# Patient Record
Sex: FEMALE | Race: WHITE | Employment: FULL TIME | URBAN - METROPOLITAN AREA
[De-identification: names, ages, dates, MRNs, and addresses within clinical notes are randomized per-mention and may not be internally consistent; named-entity substitution may affect disease eponyms.]

---

## 2017-05-04 ENCOUNTER — OFFICE VISIT (OUTPATIENT)
Dept: FAMILY MEDICINE CLINIC | Age: 27
End: 2017-05-04
Payer: COMMERCIAL

## 2017-05-04 VITALS
TEMPERATURE: 97.6 F | HEART RATE: 93 BPM | DIASTOLIC BLOOD PRESSURE: 70 MMHG | SYSTOLIC BLOOD PRESSURE: 101 MMHG | OXYGEN SATURATION: 98 % | WEIGHT: 206 LBS | BODY MASS INDEX: 33.25 KG/M2

## 2017-05-04 DIAGNOSIS — Z83.3 FAMILY HISTORY OF DIABETES MELLITUS: ICD-10-CM

## 2017-05-04 DIAGNOSIS — Z00.00 PREVENTATIVE HEALTH CARE: ICD-10-CM

## 2017-05-04 DIAGNOSIS — K59.00 CONSTIPATION, UNSPECIFIED CONSTIPATION TYPE: Primary | ICD-10-CM

## 2017-05-04 DIAGNOSIS — E66.9 OBESITY (BMI 30.0-34.9): ICD-10-CM

## 2017-05-04 PROCEDURE — 99204 OFFICE O/P NEW MOD 45 MIN: CPT | Performed by: NURSE PRACTITIONER

## 2017-05-04 RX ORDER — FAMOTIDINE 20 MG/1
20 TABLET, FILM COATED ORAL
COMMUNITY
Start: 2017-01-12 | End: 2017-05-04 | Stop reason: ALTCHOICE

## 2017-05-04 RX ORDER — VALACYCLOVIR HYDROCHLORIDE 500 MG/1
TABLET, FILM COATED ORAL
Refills: 0 | COMMUNITY
Start: 2017-02-07 | End: 2017-05-04 | Stop reason: ALTCHOICE

## 2017-05-04 ASSESSMENT — ENCOUNTER SYMPTOMS
WHEEZING: 0
SINUS PRESSURE: 0
SHORTNESS OF BREATH: 0
ABDOMINAL PAIN: 0
RHINORRHEA: 0
BLOOD IN STOOL: 0
CONSTIPATION: 1
EYE DISCHARGE: 0
COUGH: 0

## 2017-06-06 ENCOUNTER — HOSPITAL ENCOUNTER (EMERGENCY)
Facility: CLINIC | Age: 27
Discharge: HOME | End: 2017-06-06
Attending: EMERGENCY MEDICINE
Payer: COMMERCIAL

## 2017-06-06 ENCOUNTER — APPOINTMENT (OUTPATIENT)
Dept: GENERAL RADIOLOGY | Facility: CLINIC | Age: 27
End: 2017-06-06
Payer: COMMERCIAL

## 2017-06-06 VITALS
SYSTOLIC BLOOD PRESSURE: 127 MMHG | HEIGHT: 66 IN | TEMPERATURE: 98.1 F | DIASTOLIC BLOOD PRESSURE: 87 MMHG | HEART RATE: 80 BPM | OXYGEN SATURATION: 97 % | BODY MASS INDEX: 32.95 KG/M2 | WEIGHT: 205 LBS | RESPIRATION RATE: 16 BRPM

## 2017-06-06 DIAGNOSIS — J02.9 ACUTE PHARYNGITIS, UNSPECIFIED ETIOLOGY: Primary | ICD-10-CM

## 2017-06-06 PROCEDURE — 70360 X-RAY EXAM OF NECK: CPT

## 2017-06-06 PROCEDURE — 99283 EMERGENCY DEPT VISIT LOW MDM: CPT

## 2017-06-06 RX ORDER — PREDNISONE 10 MG/1
TABLET ORAL
Qty: 20 TABLET | Refills: 0 | Status: SHIPPED | OUTPATIENT
Start: 2017-06-06 | End: 2017-06-16

## 2017-06-06 RX ORDER — AMOXICILLIN 500 MG/1
500 CAPSULE ORAL 3 TIMES DAILY
Qty: 21 CAPSULE | Refills: 0 | Status: SHIPPED | OUTPATIENT
Start: 2017-06-06 | End: 2019-04-22

## 2019-04-22 ENCOUNTER — OFFICE VISIT (OUTPATIENT)
Dept: FAMILY MEDICINE CLINIC | Age: 29
End: 2019-04-22
Payer: OTHER GOVERNMENT

## 2019-04-22 VITALS
SYSTOLIC BLOOD PRESSURE: 118 MMHG | RESPIRATION RATE: 18 BRPM | WEIGHT: 230 LBS | OXYGEN SATURATION: 98 % | HEART RATE: 91 BPM | BODY MASS INDEX: 36.96 KG/M2 | DIASTOLIC BLOOD PRESSURE: 80 MMHG | HEIGHT: 66 IN

## 2019-04-22 DIAGNOSIS — B00.9 HSV INFECTION: ICD-10-CM

## 2019-04-22 DIAGNOSIS — Z83.3 FAMILY HISTORY OF DIABETES MELLITUS: ICD-10-CM

## 2019-04-22 DIAGNOSIS — Z13.29 SCREENING FOR THYROID DISORDER: ICD-10-CM

## 2019-04-22 DIAGNOSIS — E66.9 CLASS 2 OBESITY WITHOUT SERIOUS COMORBIDITY WITH BODY MASS INDEX (BMI) OF 37.0 TO 37.9 IN ADULT, UNSPECIFIED OBESITY TYPE: ICD-10-CM

## 2019-04-22 DIAGNOSIS — Z76.89 ENCOUNTER TO ESTABLISH CARE: Primary | ICD-10-CM

## 2019-04-22 DIAGNOSIS — Z13.228 SCREENING FOR METABOLIC DISORDER: ICD-10-CM

## 2019-04-22 PROCEDURE — 99385 PREV VISIT NEW AGE 18-39: CPT | Performed by: NURSE PRACTITIONER

## 2019-04-22 RX ORDER — VALACYCLOVIR HYDROCHLORIDE 1 G/1
1000 TABLET, FILM COATED ORAL DAILY
Qty: 5 TABLET | Refills: 5 | Status: SHIPPED | OUTPATIENT
Start: 2019-04-22 | End: 2019-11-08 | Stop reason: SDUPTHER

## 2019-04-22 ASSESSMENT — ENCOUNTER SYMPTOMS
WHEEZING: 0
SHORTNESS OF BREATH: 0
RESPIRATORY NEGATIVE: 1
ANAL BLEEDING: 1
DIARRHEA: 0
ALLERGIC/IMMUNOLOGIC NEGATIVE: 1
BLOOD IN STOOL: 0
VOMITING: 0
COLOR CHANGE: 0
EYES NEGATIVE: 1
STRIDOR: 0
CHEST TIGHTNESS: 0
COUGH: 0
CONSTIPATION: 1
NAUSEA: 0
ABDOMINAL PAIN: 0

## 2019-04-22 ASSESSMENT — PATIENT HEALTH QUESTIONNAIRE - PHQ9
SUM OF ALL RESPONSES TO PHQ QUESTIONS 1-9: 0
SUM OF ALL RESPONSES TO PHQ9 QUESTIONS 1 & 2: 0
SUM OF ALL RESPONSES TO PHQ QUESTIONS 1-9: 0
1. LITTLE INTEREST OR PLEASURE IN DOING THINGS: 0
2. FEELING DOWN, DEPRESSED OR HOPELESS: 0

## 2019-04-22 NOTE — PROGRESS NOTES
Visit Information    Have you changed or started any medications since your last visit including any over-the-counter medicines, vitamins, or herbal medicines? no   Are you having any side effects from any of your medications? -  no  Have you stopped taking any of your medications? Is so, why? -  no    Have you seen any other physician or provider since your last visit? No  Have you had any other diagnostic tests since your last visit? No  Have you been seen in the emergency room and/or had an admission to a hospital since we last saw you? No  Have you had your routine dental cleaning in the past 6 months? no    Have you activated your Smart Pipe account? If not, what are your barriers? Yes     Patient Care Team:  GRACE Bassett - CNP as PCP - General (Certified Nurse Practitioner)    Medical History Review  Past Medical, Family, and Social History reviewed and does not contribute to the patient presenting condition    Health Maintenance   Topic Date Due    HIV screen  07/11/2006    Cervical cancer screen  07/11/2012    Varicella Vaccine (2 of 2 - 13+ 2-dose series) 03/31/2017    Flu vaccine (Season Ended) 09/01/2019    DTaP/Tdap/Td vaccine (2 - Td) 03/02/2027    HPV vaccine  Aged Out    Pneumococcal 0-64 years Vaccine  Aged Out       [address]  Dept: [phone]    Anila Wilson is a 33 y.o. female who presents today for her medical conditions/complaintsas noted below. Anila Wilson is here today c/o New Patient (no previous pcp);  Weight Gain (thyroid has been fine); and Herpes Zoster (preventitive med & testing, top of buttock)      Past Medical History:   Diagnosis Date    HSV infection       Past Surgical History:   Procedure Laterality Date    COLPOSCOPY      TONSILLECTOMY      4340    UMBILICAL HERNIA REPAIR      WISDOM TOOTH EXTRACTION      2010       Family History   Problem Relation Age of Onset    Obesity Mother    Bright Velez OTC Miralax       Health Maintenance:      Subjective:     Review of Systems   Constitutional: Negative. Negative for activity change, appetite change, chills, diaphoresis, fatigue, fever and unexpected weight change. HENT: Negative. Eyes: Negative. Respiratory: Negative. Negative for cough, chest tightness, shortness of breath, wheezing and stridor. Cardiovascular: Negative. Negative for chest pain, palpitations and leg swelling. Gastrointestinal: Positive for anal bleeding (occ. w/ straining) and constipation. Negative for abdominal pain, blood in stool, diarrhea, nausea and vomiting. Endocrine: Negative. Negative for cold intolerance and heat intolerance. Genitourinary: Negative for difficulty urinating, frequency, hematuria and menstrual problem (IUD). Musculoskeletal: Negative. Skin: Negative. Negative for color change, pallor, rash and wound. Allergic/Immunologic: Negative. Neurological: Negative. Negative for dizziness, tremors, seizures, syncope, facial asymmetry, speech difficulty, weakness, light-headedness, numbness and headaches. Hematological: Negative. Does not bruise/bleed easily. Psychiatric/Behavioral: Negative for dysphoric mood and sleep disturbance. The patient is not nervous/anxious. Objective:     Vitals:    04/22/19 0759   BP: 118/80   Pulse: 91   Resp: 18   SpO2: 98%       Body mass index is 37.12 kg/m². Physical Exam   Constitutional: She is oriented to person, place, and time. She appears well-developed and well-nourished. No distress. HENT:   Head: Normocephalic and atraumatic. Right Ear: External ear normal.   Left Ear: External ear normal.   Nose: Nose normal.   Mouth/Throat: Oropharynx is clear and moist. No uvula swelling. No oropharyngeal exudate, posterior oropharyngeal edema, posterior oropharyngeal erythema or tonsillar abscesses. No tonsillar exudate. Cerumen impaction   Eyes: Pupils are equal, round, and reactive to light. 37.0 to 37.9 in adult, unspecified obesity type    - Excell MD Johan, Weight Management and Bariatrics, [facility]  - CBC Auto Differential; Future  - Comprehensive Metabolic Panel; Future  - Lipid Panel; Future    Discussed diet and routine exercise  Discussed medication options   Will f/u with surgeon and work first   [address] may be a good option for patient  She will check w/ her insurance as well     3. Family history of diabetes mellitus    - Comprehensive Metabolic Panel; Future    4. Screening for thyroid disorder    - TSH with Reflex; Future    5. Screening for metabolic disorder    - CBC Auto Differential; Future  - Comprehensive Metabolic Panel; Future    6. HSV infection    Valtrex given to use PRN     Discussed use, benefit, and side effects of prescribed medications. All patient questions answered. Pt voiced understanding. Reviewed health maintenance. Instructed to continue current medications, diet and exercise. Patient agreedwith treatment plan. Follow up as directed.      Electronically signed by GRACE Barrientos CNP on 4/22/2019

## 2019-05-03 ENCOUNTER — HOSPITAL ENCOUNTER (OUTPATIENT)
Age: 29
Setting detail: SPECIMEN
Discharge: HOME | End: 2019-05-03
Payer: OTHER GOVERNMENT

## 2019-05-03 ENCOUNTER — OFFICE VISIT (OUTPATIENT)
Dept: OBGYN CLINIC | Age: 29
End: 2019-05-03
Payer: OTHER GOVERNMENT

## 2019-05-03 VITALS
BODY MASS INDEX: 36.29 KG/M2 | HEIGHT: 67 IN | DIASTOLIC BLOOD PRESSURE: 82 MMHG | WEIGHT: 231.2 LBS | SYSTOLIC BLOOD PRESSURE: 106 MMHG

## 2019-05-03 DIAGNOSIS — Z01.419 ENCOUNTER FOR GYNECOLOGICAL EXAMINATION: Primary | ICD-10-CM

## 2019-05-03 DIAGNOSIS — Z97.5 IUD (INTRAUTERINE DEVICE) IN PLACE: ICD-10-CM

## 2019-05-03 LAB
ALBUMIN SERPL-MCNC: 4.3 G/DL
ALP BLD-CCNC: 72 U/L
ALT SERPL-CCNC: 17 U/L
ANION GAP SERPL CALCULATED.3IONS-SCNC: NORMAL MMOL/L
AST SERPL-CCNC: 11 U/L
BASOPHILS ABSOLUTE: 0.7 /ΜL
BASOPHILS RELATIVE PERCENT: 50 %
BILIRUB SERPL-MCNC: 0.3 MG/DL (ref 0.1–1.4)
BUN BLDV-MCNC: 12 MG/DL
CALCIUM SERPL-MCNC: 9 MG/DL
CHLORIDE BLD-SCNC: 109 MMOL/L
CHOLESTEROL, TOTAL: 110 MG/DL
CHOLESTEROL/HDL RATIO: 2.8
CO2: 25 MMOL/L
CREAT SERPL-MCNC: 0.8 MG/DL
EOSINOPHILS ABSOLUTE: 2.2 /ΜL
EOSINOPHILS RELATIVE PERCENT: 158 %
GFR CALCULATED: 101
GLUCOSE BLD-MCNC: 108 MG/DL
HCT VFR BLD CALC: 40.1 % (ref 36–46)
HDLC SERPL-MCNC: 39 MG/DL (ref 35–70)
HEMOGLOBIN: 13.1 G/DL (ref 12–16)
LDL CHOLESTEROL CALCULATED: 58 MG/DL (ref 0–160)
LYMPHOCYTES ABSOLUTE: 21.9 /ΜL
LYMPHOCYTES RELATIVE PERCENT: 1577 %
MCH RBC QN AUTO: 27.2 PG
MCHC RBC AUTO-ENTMCNC: 32.7 G/DL
MCV RBC AUTO: 83.4 FL
MONOCYTES ABSOLUTE: 7.4 /ΜL
MONOCYTES RELATIVE PERCENT: 533 %
NEUTROPHILS ABSOLUTE: 67.8 /ΜL
NEUTROPHILS RELATIVE PERCENT: 4882 %
PDW BLD-RTO: 12.6 %
PLATELET # BLD: 261 K/ΜL
PMV BLD AUTO: 10.8 FL
POTASSIUM SERPL-SCNC: 4.3 MMOL/L
RBC # BLD: 4.81 10^6/ΜL
SODIUM BLD-SCNC: 140 MMOL/L
TOTAL PROTEIN: 6.6
TRIGL SERPL-MCNC: 54 MG/DL
TSH SERPL DL<=0.05 MIU/L-ACNC: 1.72 UIU/ML
VLDLC SERPL CALC-MCNC: 71 MG/DL
WBC # BLD: 7.2 10^3/ML

## 2019-05-03 PROCEDURE — 99385 PREV VISIT NEW AGE 18-39: CPT | Performed by: NURSE PRACTITIONER

## 2019-05-03 ASSESSMENT — ENCOUNTER SYMPTOMS
CONSTIPATION: 0
DIARRHEA: 0
BACK PAIN: 0
SHORTNESS OF BREATH: 0
ABDOMINAL PAIN: 0
ABDOMINAL DISTENTION: 0
COUGH: 0

## 2019-05-03 NOTE — PROGRESS NOTES
HPI:     Evelyn Gil a 33 y.o. female who presents today for:  Chief Complaint   Patient presents with   [address] [facility] [address] Annual Exam     last pap 2017       HPI  Here as a new pt to establish care and for annual exam. Works as an  at the [employer]. Has a 3 y/o son. Thinking about eating healthy and adding activity. Her dad has Ca and lives with her. GYNECOLOGICHISTORY:  MenstrualHistory: No LMP recorded. Patient has had an implant. Sexually Transmitted Infections: No  History of Ectopic Pregnancy:No  Menarche: 13  No cycles  Sexually active: not currently  Dyspareunia: none    Current Outpatient Medications   Medication Sig Dispense Refill    valACYclovir (VALTREX) 1 g tablet Take 1 tablet by mouth daily Start within 24 hours of sx 5 tablet 5     No current facility-administered medications for this visit.       Allergies   Allergen Reactions    Percocet [Oxycodone-Acetaminophen] Nausea And Vomiting     Other reaction(s): severe Nausea And Vomiting; syncope    Vancomycin Hives       Past Medical History:   Diagnosis Date    HSV infection      Denies family history of breast, ovarian, uterus, colon CA     Past Surgical History:   Procedure Laterality Date    COLPOSCOPY      INTRAUTERINE DEVICE INSERTION  2017    Mirena     TONSILLECTOMY      8112    UMBILICAL HERNIA REPAIR      WISDOM TOOTH EXTRACTION      2010     Family History   Problem Relation Age of Onset    Obesity Mother     Diabetes Mother         borderline     Cervical Cancer Mother 37    Alcohol Abuse Father     Other Father         myeloproliferative disorder    No Known Problems Maternal Grandmother     Obesity Maternal Grandfather     Kidney Disease Maternal Grandfather     Substance Abuse Sister     Thyroid Disease Brother     Breast Cancer Paternal Grandmother     Other Brother         hearing loss      Social History     Tobacco Use    Smoking status: Never Smoker    Smokeless tobacco: Never Used   Substance Use Topics    Alcohol use: No        Subjective:      Review of Systems   Constitutional: Negative for appetite change and fatigue. HENT: Negative for congestion and hearing loss. Eyes: Negative for visual disturbance. Respiratory: Negative for cough and shortness of breath. Cardiovascular: Negative for chest pain and palpitations. Gastrointestinal: Negative for abdominal distention, abdominal pain, constipation and diarrhea. Genitourinary: Negative for flank pain, frequency, menstrual problem, pelvic pain and vaginal discharge. Musculoskeletal: Negative for back pain. Neurological: Negative for syncope and headaches. Psychiatric/Behavioral: Negative for behavioral problems. Objective:     Ht 5' 6.5\" (1.689 m)   Wt 231 lb 3.2 oz (104.9 kg)   Breastfeeding? No   BMI 36.76 kg/m²   Physical Exam   Constitutional: She is oriented to person, place, and time. She appears well-developed and well-nourished. Eyes: Pupils are equal, round, and reactive to light. Neck: No tracheal deviation present. No thyromegaly present. Cardiovascular: Normal rate, regular rhythm and normal heart sounds. Pulmonary/Chest: Effort normal and breath sounds normal. No respiratory distress. Right breast exhibits no inverted nipple. Left breast exhibits no inverted nipple, no mass, no nipple discharge, no skin change and no tenderness. No breast tenderness, discharge or bleeding. Breasts are symmetrical.   Abdominal: Soft. Bowel sounds are normal. She exhibits no distension and no mass. There is no tenderness. There is no CVA tenderness. Hernia confirmed negative in the right inguinal area and confirmed negative in the left inguinal area. Genitourinary: No breast tenderness, discharge or bleeding. There is no rash or lesion on the right labia. There is no rash or lesion on the left labia. Uterus is not deviated, not enlarged and not fixed.  Cervix exhibits no motion tenderness, no discharge and no friability. Right adnexum displays no mass, no tenderness and no fullness. Left adnexum displays no mass, no tenderness and no fullness. No tenderness in the vagina. No vaginal discharge found. Musculoskeletal: She exhibits no edema or tenderness. Lymphadenopathy:     She has no cervical adenopathy. Right: No inguinal adenopathy present. Left: No inguinal adenopathy present. Neurological: She is alert and oriented to person, place, and time. Skin: Skin is warm and dry. Psychiatric: She has a normal mood and affect. Her behavior is normal. Judgment and thought content normal.     IUD strings visualized    Assessment:     1. Encounter for gynecological examination          Plan:   1. Pap collected. Discussed new pap smear guidelines. Desires re-pap in 1 year. 2. Breast self exam reviewed  3. Calcium and Vitamin D dosing reviewed. 4. Seat belt use reviewed  5. Family planning reviewed.   Birth control IUD    Electronicallysigned by Alida Foster on 5/3/2019

## 2019-05-08 ENCOUNTER — TELEPHONE (OUTPATIENT)
Dept: FAMILY MEDICINE CLINIC | Age: 29
End: 2019-05-08

## 2019-05-08 NOTE — TELEPHONE ENCOUNTER
Patient says she does not qualify for bariatric surgery. 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 The number for the flight surgeon is [phone]. That is who she has to give all this information to.

## 2019-05-09 LAB — CYTOLOGY REPORT: NORMAL

## 2019-05-09 NOTE — TELEPHONE ENCOUNTER
Patient said this is due to insurance regulation, she does not have a BMI of 40 or greater. I am going to print the suggestions you made for her to check with insurance and she will stop by to get it so she can contact them.

## 2019-05-15 DIAGNOSIS — Z13.29 SCREENING FOR THYROID DISORDER: ICD-10-CM

## 2019-05-15 DIAGNOSIS — E66.9 CLASS 2 OBESITY WITHOUT SERIOUS COMORBIDITY WITH BODY MASS INDEX (BMI) OF 37.0 TO 37.9 IN ADULT, UNSPECIFIED OBESITY TYPE: ICD-10-CM

## 2019-05-15 DIAGNOSIS — Z83.3 FAMILY HISTORY OF DIABETES MELLITUS: ICD-10-CM

## 2019-05-15 DIAGNOSIS — Z13.228 SCREENING FOR METABOLIC DISORDER: ICD-10-CM

## 2019-08-13 ENCOUNTER — HOSPITAL ENCOUNTER (OUTPATIENT)
Age: 29
Setting detail: SPECIMEN
Discharge: HOME | End: 2019-08-13
Payer: OTHER GOVERNMENT

## 2019-08-13 ENCOUNTER — OFFICE VISIT (OUTPATIENT)
Dept: FAMILY MEDICINE CLINIC | Age: 29
End: 2019-08-13
Payer: OTHER GOVERNMENT

## 2019-08-13 VITALS
HEART RATE: 97 BPM | TEMPERATURE: 98.1 F | SYSTOLIC BLOOD PRESSURE: 110 MMHG | OXYGEN SATURATION: 100 % | DIASTOLIC BLOOD PRESSURE: 66 MMHG | WEIGHT: 212 LBS | RESPIRATION RATE: 12 BRPM | BODY MASS INDEX: 33.71 KG/M2

## 2019-08-13 DIAGNOSIS — R07.0 THROAT DISCOMFORT: Primary | ICD-10-CM

## 2019-08-13 DIAGNOSIS — R07.0 THROAT DISCOMFORT: ICD-10-CM

## 2019-08-13 PROCEDURE — 99213 OFFICE O/P EST LOW 20 MIN: CPT | Performed by: NURSE PRACTITIONER

## 2019-08-13 RX ORDER — IBUPROFEN 800 MG/1
TABLET ORAL
COMMUNITY
Start: 2019-05-08 | End: 2020-02-13

## 2019-08-13 ASSESSMENT — ENCOUNTER SYMPTOMS
VOICE CHANGE: 0
RESPIRATORY NEGATIVE: 1
SINUS PRESSURE: 0
GASTROINTESTINAL NEGATIVE: 1
EYES NEGATIVE: 1
CHEST TIGHTNESS: 0
CHOKING: 0
SINUS PAIN: 0
APNEA: 0
TROUBLE SWALLOWING: 0
NAUSEA: 0
FACIAL SWELLING: 0
VOMITING: 0
SHORTNESS OF BREATH: 0
COUGH: 0
STRIDOR: 0
COLOR CHANGE: 0
ABDOMINAL PAIN: 0
SORE THROAT: 1
WHEEZING: 0
ALLERGIC/IMMUNOLOGIC NEGATIVE: 1

## 2019-08-13 ASSESSMENT — PATIENT HEALTH QUESTIONNAIRE - PHQ9
SUM OF ALL RESPONSES TO PHQ QUESTIONS 1-9: 0
1. LITTLE INTEREST OR PLEASURE IN DOING THINGS: 0
SUM OF ALL RESPONSES TO PHQ9 QUESTIONS 1 & 2: 0
2. FEELING DOWN, DEPRESSED OR HOPELESS: 0
SUM OF ALL RESPONSES TO PHQ QUESTIONS 1-9: 0

## 2019-08-13 NOTE — PROGRESS NOTES
REMOVAL OF CONTRACEPTIVE CAPSULE  2011    TONSILLECTOMY      2816    UMBILICAL HERNIA REPAIR      WISDOM TOOTH EXTRACTION      2010       Family History   Problem Relation Age of Onset    Obesity Mother     Diabetes Mother         borderline     Cervical Cancer Mother 37    Alcohol Abuse Father     Other Father         myeloproliferative disorder    No Known Problems Maternal Grandmother     Obesity Maternal Grandfather     Kidney Disease Maternal Grandfather     Substance Abuse Sister     Thyroid Disease Brother     Breast Cancer Paternal Grandmother     Other Brother         hearing loss        Social History     Tobacco Use    Smoking status: Never Smoker    Smokeless tobacco: Never Used   Substance Use Topics    Alcohol use: No      Current Outpatient Medications   Medication Sig Dispense Refill    valACYclovir (VALTREX) 1 g tablet Take 1 tablet by mouth daily Start within 24 hours of sx 5 tablet 5     No current facility-administered medications for this visit.       Allergies   Allergen Reactions    Percocet [Oxycodone-Acetaminophen] Nausea And Vomiting     Other reaction(s): severe Nausea And Vomiting; syncope    Vancomycin Hives         HPI:     HPI    Presents today c/o sensation that something is stuck in her throat , L > R x 2 weeks   Patient declines waking up with a sore throat  Feels like the more she talks throughout the day she starts to get a scratchy throat sensation   Feels like she has to put more of an effort into swallowing her mucus  No issues swallowing food or drinks , no choking   No sensation that food is getting stuck, no problems swallowing   No acute URI symptoms, no fevers   No heartburn, nausea or vomiting  Had clear nasal congestion x 1 week which resolved  No obvious rhinorrhea or post nasal drip associated  Son has been sick however   Has tried increasing fluids / cold water which is temporary relief     Doing Keto diet  Has lost 20 lb on her own     Health

## 2019-08-15 LAB
CULTURE: NORMAL
Lab: NORMAL
SPECIMEN DESCRIPTION: NORMAL

## 2019-11-08 RX ORDER — VALACYCLOVIR HYDROCHLORIDE 1 G/1
1000 TABLET, FILM COATED ORAL DAILY
Qty: 5 TABLET | Refills: 0 | Status: SHIPPED | OUTPATIENT
Start: 2019-11-08 | End: 2020-02-13 | Stop reason: ALTCHOICE

## 2020-01-03 ENCOUNTER — OFFICE VISIT (OUTPATIENT)
Dept: FAMILY MEDICINE CLINIC | Age: 30
End: 2020-01-03
Payer: OTHER GOVERNMENT

## 2020-01-03 VITALS
OXYGEN SATURATION: 97 % | BODY MASS INDEX: 35.93 KG/M2 | HEART RATE: 88 BPM | WEIGHT: 226 LBS | DIASTOLIC BLOOD PRESSURE: 74 MMHG | SYSTOLIC BLOOD PRESSURE: 132 MMHG

## 2020-01-03 PROCEDURE — 99213 OFFICE O/P EST LOW 20 MIN: CPT | Performed by: NURSE PRACTITIONER

## 2020-01-03 NOTE — PROGRESS NOTES
Vancomycin Hives          Subjective     · Constitutional:  Negative for activity change, appetite change,unexpected weight change, chills, fever, and fatigue. · HENT: Negative for ear pain, sore throat,  Rhinorrhea, sinus pain, sinus pressure, congestion. · Eyes:  Negative for pain and discharge. · Respiratory:  Negative for chest tightness, shortness of breath, wheezing, and cough. · Cardiovascular:  Negative for chest pain, palpitations and leg swelling. · Gastrointestinal: Negative for abdominal pain, blood in stool, constipation,diarrhea, nausea and vomiting. · Endocrine: Negative for cold intolerance, heat intolerance, polydipsia, polyphagia and polyuria. · Genitourinary: Negative for difficulty urinating, dysuria, flank pain, frequency, hematuria and urgency. · Musculoskeletal: Negative for arthralgias, back pain, joint swelling, myalgias, neck pain and neck stiffness. Positive for right knee pain  · Skin: Negative for rash and wound. · Allergic/Immunologic: Negative for environmental allergies and food allergies. · Neurological:  Negative for dizziness, light-headedness, numbness and headaches. · Hematological:  Negative for adenopathy. Does not bruise/bleed easily. · Psychiatric/Behavioral: Negative for self-injury, sleep disturbance and suicidal ideas. Objective     PHYSICAL EXAM:   · Constitutional: Betina Stein is oriented to person, place, and time. Vital signs are normal. Appears well-developed and well-nourished. · HEENT:   · Head: Normocephalic and atraumatic. Eyes:PERRL, EOMI, Conjunctiva normal, No discharge. · Cardiovascular: Normal rate, regular rhythm, S1, S2, no murmur, no gallop, no friction rub, intact distal pulses. · Pulmonary/Chest: Breath sounds are clear throughout, No respiratory distress, No wheezing, No chest tenderness.  Effort normal  · Musculoskeletal:   Physical Exam  Musculoskeletal:      Right knee: She exhibits normal range of motion, no

## 2020-02-13 ENCOUNTER — OFFICE VISIT (OUTPATIENT)
Dept: FAMILY MEDICINE CLINIC | Age: 30
End: 2020-02-13
Payer: OTHER GOVERNMENT

## 2020-02-13 VITALS
BODY MASS INDEX: 34.63 KG/M2 | HEART RATE: 82 BPM | SYSTOLIC BLOOD PRESSURE: 122 MMHG | WEIGHT: 217.8 LBS | DIASTOLIC BLOOD PRESSURE: 78 MMHG | TEMPERATURE: 98.2 F | OXYGEN SATURATION: 98 %

## 2020-02-13 PROCEDURE — 99213 OFFICE O/P EST LOW 20 MIN: CPT | Performed by: NURSE PRACTITIONER

## 2020-02-13 RX ORDER — AMOXICILLIN 875 MG/1
TABLET, COATED ORAL 2 TIMES DAILY
COMMUNITY
Start: 2020-02-11 | End: 2020-09-29

## 2020-02-13 ASSESSMENT — ENCOUNTER SYMPTOMS
RESPIRATORY NEGATIVE: 1
COUGH: 0
TROUBLE SWALLOWING: 0
ALLERGIC/IMMUNOLOGIC NEGATIVE: 1
EYES NEGATIVE: 1
VOICE CHANGE: 0
NAUSEA: 0
SINUS PRESSURE: 1
SORE THROAT: 0
CHEST TIGHTNESS: 0
DIARRHEA: 0
COLOR CHANGE: 0
ABDOMINAL PAIN: 0
GASTROINTESTINAL NEGATIVE: 1
VOMITING: 0
SINUS PAIN: 0
RHINORRHEA: 0

## 2020-02-13 NOTE — PROGRESS NOTES
[facility]  [address] [address] [facility] [address]  Dept: [phone]    Shannan Booker is a 30 y.o. female who presents today for her medical conditions/complaintsas noted below. Shannan Booker is here today c/o Otalgia (left. urgent care tuesday- Amolil and Ciprodex drops)    Past Medical History:   Diagnosis Date    Abnormal Pap smear of cervix     HPV in female     HSV infection       Past Surgical History:   Procedure Laterality Date    COLPOSCOPY      INSERTION OF CONTRACEPTIVE CAPSULE  2011    Nexplanon     INTRAUTERINE DEVICE INSERTION  2017    Mirena     REMOVAL OF CONTRACEPTIVE CAPSULE  2011    TONSILLECTOMY      7638    UMBILICAL HERNIA REPAIR      WISDOM TOOTH EXTRACTION      2010       Family History   Problem Relation Age of Onset    Obesity Mother     Diabetes Mother         borderline     Cervical Cancer Mother 37    Alcohol Abuse Father     Other Father         myeloproliferative disorder    No Known Problems Maternal Grandmother     Obesity Maternal Grandfather     Kidney Disease Maternal Grandfather     Substance Abuse Sister     Thyroid Disease Brother     Breast Cancer Paternal Grandmother     Other Brother         hearing loss        Social History     Tobacco Use    Smoking status: Never Smoker    Smokeless tobacco: Never Used   Substance Use Topics    Alcohol use: No      Current Outpatient Medications   Medication Sig Dispense Refill    amoxicillin (AMOXIL) 875 MG tablet 2 times daily      CIPRODEX 0.3-0.1 % otic suspension 2 drops 4 times daily       No current facility-administered medications for this visit.       Allergies   Allergen Reactions    Percocet [Oxycodone-Acetaminophen] Nausea And Vomiting     Other reaction(s): severe Nausea And Vomiting; syncope    Vancomycin Hives         HPI:     Otalgia    There is pain in the left (started with 'pimple' that burst inside L ear canal, that proceed into 'infection' per urgent care) ear. This is a new problem. The current episode started in the past 7 days. The problem has been waxing and waning. There has been no fever. The patient is experiencing no pain. Associated symptoms include hearing loss (very muffeled L ear). Pertinent negatives include no abdominal pain, coughing, diarrhea, ear discharge, headaches, rash, rhinorrhea, sore throat or vomiting. She has tried ear drops (Amoxil) for the symptoms. The treatment provided no relief. There is no history of a chronic ear infection, hearing loss or a tympanostomy tube. Went to urgent care on Tues. Dx with \"infection\" and prescribed drops and Amoxil oral  Patient notes she does not think the drops are getting into her L ear and c/o muffled hearing     Health Maintenance:      Subjective:     Review of Systems   Constitutional: Negative. Negative for appetite change, chills, diaphoresis, fatigue and fever. HENT: Positive for hearing loss (very muffeled L ear), sinus pressure and tinnitus. Negative for congestion, ear discharge, ear pain, rhinorrhea, sinus pain, sore throat, trouble swallowing and voice change. Eyes: Negative. Respiratory: Negative. Negative for cough and chest tightness. Cardiovascular: Negative. Gastrointestinal: Negative. Negative for abdominal pain, diarrhea, nausea and vomiting. Endocrine: Negative. Genitourinary: Negative. Musculoskeletal: Negative. Skin: Negative. Negative for color change, pallor, rash and wound. Allergic/Immunologic: Negative. Neurological: Negative. Negative for headaches. Hematological: Negative. Psychiatric/Behavioral: Negative. Objective:     Vitals:    02/13/20 1106   BP: 122/78   Pulse: 82   Temp: 98.2 °F (36.8 °C)   SpO2: 98%       Body mass index is 34.63 kg/m². Physical Exam  Constitutional:       General: She is not in acute distress. Appearance: She is well-developed. HENT:      Head: Normocephalic and atraumatic.       Right Ear: and side effects of prescribed medications. All patient questions answered. Pt voiced understanding. Reviewed health maintenance. Instructed to continue current medications, diet and exercise. Patient agreedwith treatment plan. Follow up as directed.      Electronically signed by GRACE Alanis CNP on 2/13/2020

## 2020-09-29 ENCOUNTER — NURSE TRIAGE (OUTPATIENT)
Dept: OTHER | Facility: CLINIC | Age: 30
End: 2020-09-29

## 2020-09-29 ENCOUNTER — OFFICE VISIT (OUTPATIENT)
Dept: FAMILY MEDICINE CLINIC | Age: 30
End: 2020-09-29
Payer: OTHER GOVERNMENT

## 2020-09-29 VITALS
WEIGHT: 244.8 LBS | SYSTOLIC BLOOD PRESSURE: 114 MMHG | HEART RATE: 82 BPM | OXYGEN SATURATION: 98 % | BODY MASS INDEX: 38.92 KG/M2 | TEMPERATURE: 97.5 F | DIASTOLIC BLOOD PRESSURE: 78 MMHG

## 2020-09-29 LAB
BASOPHILS ABSOLUTE: 0.7 /ΜL
BASOPHILS RELATIVE PERCENT: 60 %
EOSINOPHILS ABSOLUTE: 2.4 /ΜL
EOSINOPHILS RELATIVE PERCENT: 206 %
HCT VFR BLD CALC: 42.3 % (ref 36–46)
HEMOGLOBIN: 14.3 G/DL (ref 12–16)
LYMPHOCYTES ABSOLUTE: 23.5 /ΜL
LYMPHOCYTES RELATIVE PERCENT: 2021 %
MCH RBC QN AUTO: 28.7 PG
MCHC RBC AUTO-ENTMCNC: 33.8 G/DL
MCV RBC AUTO: 84.9 FL
MONOCYTES ABSOLUTE: 7.3 /ΜL
MONOCYTES RELATIVE PERCENT: 628 %
NEUTROPHILS ABSOLUTE: 66.1 /ΜL
NEUTROPHILS RELATIVE PERCENT: 5685 %
PDW BLD-RTO: 12.7 %
PLATELET # BLD: 299 K/ΜL
PMV BLD AUTO: 10.3 FL
RBC # BLD: 4.98 10^6/ΜL
WBC # BLD: 8.6 10^3/ML

## 2020-09-29 PROCEDURE — 99213 OFFICE O/P EST LOW 20 MIN: CPT | Performed by: FAMILY MEDICINE

## 2020-09-29 ASSESSMENT — ENCOUNTER SYMPTOMS
DIFFICULTY BREATHING: 0
HEMATEMESIS: 0
RECTAL PAIN: 1
DIARRHEA: 0
HEMATOCHEZIA: 1

## 2020-09-29 NOTE — TELEPHONE ENCOUNTER
Reason for Disposition   MODERATE rectal bleeding (small blood clots, passing blood without stool, or toilet water turns red)    Answer Assessment - Initial Assessment Questions  1. APPEARANCE of BLOOD: \"What color is it? \" \"Is it passed separately, on the surface of the stool, or mixed in with the stool? \"       Bright red blood on the stool and in toilet water. 2. AMOUNT: \"How much blood was passed? \"       Small amount of blood on stool and in water - Dime size clots when wiping. 3. FREQUENCY: \"How many times has blood been passed with the stools? \"       Every other day BM with blood present. Has been going on for 3 weeks. 4. ONSET: \"When was the blood first seen in the stools? \" (Days or weeks)       Approx 3 weeks ago. 5. DIARRHEA: \"Is there also some diarrhea? \" If so, ask: \"How many diarrhea stools were passed in past 24 hours? \"       Formed, soft stool. 6. CONSTIPATION: \"Do you have constipation? \" If so, \"How bad is it? \"      None  7. RECURRENT SYMPTOMS: \"Have you had blood in your stools before? \" If so, ask: \"When was the last time? \" and \"What happened that time? \"       Has not had this before. 8. BLOOD THINNERS: \"Do you take any blood thinners? \" (e.g., Coumadin/warfarin, Pradaxa/dabigatran, aspirin)      No blood thinners. 9. OTHER SYMPTOMS: \"Do you have any other symptoms? \"  (e.g., abdominal pain, vomiting, dizziness, fever)      Descri bed as \"period cramps\"  10. PREGNANCY: \"Is there any chance you are pregnant? \" \"When was your last menstrual period? \"       Has Mirena    Protocols used: RECTAL BLEEDING-ADULT-[place]    Pt reports having BM every other day that has bright red blood visible on the formed stool and in the toilet bowl. Reports have dime size blood clots when she wipes. States this has been ongoing for 3 weeks. Reviewed for pt to be seen today by PCP. Warm transfer to [facility] in [place]. Caller provided care advice and instructed to call back with worsening symptoms. Attention Provider: Thank you for allowing me to participate in the care of your patient. The patient was connected to triage in response to information provided to the [facility]. Please do not respond through this encounter as the response is not directed to a shared pool.

## 2020-09-29 NOTE — PATIENT INSTRUCTIONS
Patient Education        Rectal Bleeding: Care Instructions  Your Care Instructions     Rectal bleeding in small amounts is common. You may see red spotting on toilet paper or drops of blood in the toilet. Rectal bleeding has many possible causes, from something as minor as hemorrhoids to something as serious as colon cancer. You may need more tests to find the cause of your bleeding. Follow-up care is a key part of your treatment and safety. Be sure to make and go to all appointments, and call your doctor if you are having problems. It's also a good idea to know your test results and keep a list of the medicines you take. How can you care for yourself at home? · Avoid aspirin and other nonsteroidal anti-inflammatory drugs (NSAIDs), such as ibuprofen (Advil, Motrin) and naproxen (Aleve). They can cause you to bleed more. Ask your doctor if you can take acetaminophen (Tylenol). Read and follow all instructions on the label. · Use a stool softener that contains bran or psyllium. You can save money by buying bran or psyllium (available in bulk at most health food stores) and sprinkling it on foods or stirring it into fruit juice. You can also use a product such as Metamucil or Citrucel. · Take your medicines exactly as directed. Call your doctor if you think you are having a problem with your medicine. When should you call for help? [id number] anytime you think you may need emergency care. For example, call if:  · You passed out (lost consciousness). Call your doctor now or seek immediate medical care if:  · You have new or worse pain. · You have new or worse bleeding from the rectum. · You are dizzy or light-headed, or you feel like you may faint. Watch closely for changes in your health, and be sure to contact your doctor if:  · You cannot pass stools or gas. · You do not get better as expected. Where can you learn more? Go to https://[email or web address].[employer]. org and sign in to your [employer] account. Enter V009 in the [facility] box to learn more about \"Rectal Bleeding: Care Instructions. \"     If you do not have an account, please click on the \"Sign Up Now\" link. Current as of: August 12, 2019               Content Version: 12.5  © 4709-6588 [employer]. Care instructions adapted under license by [facility] ([facility]). If you have questions about a medical condition or this instruction, always ask your healthcare professional. [address] any warranty or liability for your use of this information.

## 2020-09-29 NOTE — PROGRESS NOTES
[facility] Progress Note    Date:9/29/2020         Patient Ignacia Diaz     YOB: 1990     Age:30 y.o. Assessment/Plan        Problem List Items Addressed This Visit        Digestive    Chronic constipation     Continue OTC treatment  Refer to GI for further workup and management         Relevant Orders    POCT occult blood stool    Blood Occult Stool #1    Blood Occult Stool #1    Blood Occult Stool #1    CBC Auto Differential    Peter Moulton MD, G[facility]ology, [address]      Other Visit Diagnoses     BRBPR (bright red blood per rectum)    -  Primary    Negative hemeoccult in office today  Given hemeoccult cards x3  Refer to GI    Relevant Orders    POCT occult blood stool    Blood Occult Stool #1    Blood Occult Stool #1    Blood Occult Stool #1    CBC Auto Differential    Peter Moulton MD, Gastroenterology, [address]    History of anal fissures        Relevant Orders    POCT occult blood stool    Blood Occult Stool #1    Blood Occult Stool #1    Blood Occult Stool #1    CBC Auto Differential    Peter Moulton MD, Gastroenterology, [address]    History of hemorrhoids        Relevant Orders    POCT occult blood stool    Blood Occult Stool #1    Blood Occult Stool #1    Blood Occult Stool #1    CBC Auto Differential    Peter Moulton MD, Gastroenterology, [address]           Return if symptoms worsen or fail to improve. Electronically signed by Matt Parham DO on 9/29/20         Sridhar Leong is a 35 y.o. female presenting today for   Chief Complaint   Patient presents with    Rectal Bleeding     X 3 weeks w/ bowel movements. clots     Pharyngitis     son dx strep   . Bowels have been \"difficult\" her whole life. Normal BMs are once per week. Very firm and hard. She does Miralax multiple times per week when she can. 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 Works a lot (). Going to school.  Never diagnosed with external hemorrhoids or felt them. Has had occasional bleeding in the past but short duration and self limiting. Now going every other day. Always some sort of blood on stool. Always bright red. Sometimes in bowl. Clot every other BM. Size of diameter of drinking straw. Was taking Amitiza when she was in the [employer]. No period since IUD placement 3 years ago. Mom has h/o polyps. No CRC history    Developed sore throat this morning. No fever. Son has strep throat started 3 days ago confirmed by overnight test. Rapid had been negative. Rectal Bleeding    The current episode started more than 2 weeks ago (3 weeks). The onset was sudden. The problem occurs frequently. The problem has been gradually worsening. The pain is moderate. The stool is described as hard and bloody. Prior successful therapies include laxatives (amitiza). Prior unsuccessful therapies include stool softeners. Associated symptoms include hemorrhoids and rectal pain. Pertinent negatives include no diarrhea, no hematemesis, no vaginal bleeding, no vaginal discharge and no difficulty breathing. She has been behaving normally. She has been eating and drinking normally. Urine output has been normal. Her past medical history does not include recent antibiotic use or a recent illness. Review of Systems   Review of Systems   Gastrointestinal: Positive for hematochezia, hemorrhoids and rectal pain. Negative for diarrhea and hematemesis. Genitourinary: Negative for vaginal bleeding and vaginal discharge. All other systems reviewed and are negative. Medications     No current outpatient medications on file. No current facility-administered medications for this visit. Past History    Past Medical History:   has a past medical history of Abnormal Pap smear of cervix, HPV in female, and HSV infection. Social History:   reports that she has never smoked.  She has never used smokeless tobacco. She reports that she does not drink alcohol or use drugs. Family History:   Family History   Problem Relation Age of Onset    Obesity Mother     Diabetes Mother         borderline     Cervical Cancer Mother 37    Alcohol Abuse Father     Other Father         myeloproliferative disorder    No Known Problems Maternal Grandmother     Obesity Maternal Grandfather     Kidney Disease Maternal Grandfather     Substance Abuse Sister     Thyroid Disease Brother     Breast Cancer Paternal Grandmother     Other Brother         hearing loss        Surgical History:   Past Surgical History:   Procedure Laterality Date    COLPOSCOPY      INSERTION OF CONTRACEPTIVE CAPSULE  2011    Nexplanon     INTRAUTERINE DEVICE INSERTION  2017    Mirena     REMOVAL OF CONTRACEPTIVE CAPSULE  2011    TONSILLECTOMY      9659    UMBILICAL HERNIA REPAIR      WISDOM TOOTH EXTRACTION      2010        Physical Examination      Vitals:  /78   Pulse 82   Temp 97.5 °F (36.4 °C) (Temporal)   Wt 244 lb 12.8 oz (111 kg)   SpO2 98%   BMI 38.92 kg/m²     Physical Exam  Vitals signs and nursing note reviewed. Exam conducted with a chaperone present Thiago Bhakta MA). Constitutional:       General: She is not in acute distress. Appearance: Normal appearance. She is normal weight. She is not ill-appearing, toxic-appearing or diaphoretic. HENT:      Head: Normocephalic and atraumatic. Eyes:      General: No scleral icterus. Right eye: No discharge. Left eye: No discharge. Extraocular Movements: Extraocular movements intact. Conjunctiva/sclera: Conjunctivae normal.   Cardiovascular:      Rate and Rhythm: Normal rate and regular rhythm. Pulses: Normal pulses. Heart sounds: Normal heart sounds. No murmur. No friction rub. No gallop. Pulmonary:      Effort: Pulmonary effort is normal. No respiratory distress. Breath sounds: Normal breath sounds. No stridor. No wheezing, rhonchi or rales. Chest:      Chest wall: No tenderness. Abdominal:      General: Abdomen is flat. Bowel sounds are decreased. There is no distension or abdominal bruit. There are no signs of injury. Palpations: Abdomen is soft. There is no shifting dullness, fluid wave, hepatomegaly, splenomegaly, mass or pulsatile mass. Tenderness: There is abdominal tenderness in the right lower quadrant and left lower quadrant. There is no right CVA tenderness, left CVA tenderness, guarding or rebound. Negative signs include Contreras's sign, Rovsing's sign, McBurney's sign, psoas sign and obturator sign. Genitourinary:     Rectum: Guaiac result negative (negative). Tenderness present. No mass, anal fissure, external hemorrhoid (skin tag at 6 oclock) or internal hemorrhoid. Abnormal anal tone (very tight/small rectal opening). Neurological:      Mental Status: She is alert and oriented to person, place, and time. Mental status is at baseline. Psychiatric:         Mood and Affect: Mood normal.         Behavior: Behavior normal.         Thought Content: Thought content normal.         Judgment: Judgment normal.         Labs/Imaging/Diagnostics   Labs:  No results found for: CMPWITHGFR, CBCAUTODIF, TSHFT4, LABA1C, LIPIDPAN    Imaging Last 24 Hours:  XR Neck Soft Tissue  Narrative: EXAMINATION:  AP AND LATERAL VIEWS OF THE NECK SOFT TISSUES    6/6/2017 3:18 pm    COMPARISON:  None. HISTORY:  ORDERING SYSTEM PROVIDED HISTORY: pain  TECHNOLOGIST PROVIDED HISTORY:  Reason for exam:->pain  Ordering Physician Provided Reason for Exam: Pt c/o feeling of \"something  being stuck in my throat. It feels like a popcorn kernel. \" Pt denies eating  popcorn, denies trauma, denies surgery. Sensation x10 days. Acuity: Acute  Type of Exam: Initial    FINDINGS:  AP and lateral views of the soft tissue neck are submitted. The epiglottis  and aryepiglottic folds are unremarkable the prevertebral soft tissues are  unremarkable. No radiopaque foreign body or retropharyngeal gas. No  subglottic edema. Cervical vertebral alignment is maintained. Impression: Unremarkable AP and lateral soft tissue neck.

## 2020-09-30 ENCOUNTER — OFFICE VISIT (OUTPATIENT)
Dept: GASTROENTEROLOGY | Age: 30
End: 2020-09-30
Payer: OTHER GOVERNMENT

## 2020-09-30 VITALS — WEIGHT: 237.8 LBS | BODY MASS INDEX: 37.81 KG/M2

## 2020-09-30 PROCEDURE — 99204 OFFICE O/P NEW MOD 45 MIN: CPT | Performed by: INTERNAL MEDICINE

## 2020-09-30 RX ORDER — HYDROCORTISONE ACETATE 25 MG/1
25 SUPPOSITORY RECTAL EVERY 12 HOURS
Qty: 30 SUPPOSITORY | Refills: 2 | Status: SHIPPED | OUTPATIENT
Start: 2020-09-30 | End: 2021-04-22

## 2020-09-30 RX ORDER — POLYETHYLENE GLYCOL 3350 17 G/17G
17 POWDER, FOR SOLUTION ORAL DAILY
Status: ON HOLD | COMMUNITY
End: 2022-11-02 | Stop reason: HOSPADM

## 2020-09-30 ASSESSMENT — ENCOUNTER SYMPTOMS
COUGH: 0
SORE THROAT: 0
CHOKING: 0
BACK PAIN: 0
SINUS PRESSURE: 0
ABDOMINAL DISTENTION: 0
BLOOD IN STOOL: 1
CONSTIPATION: 1
DIARRHEA: 0
NAUSEA: 0
RECTAL PAIN: 1
ANAL BLEEDING: 0
WHEEZING: 0
VOMITING: 0
ABDOMINAL PAIN: 0
TROUBLE SWALLOWING: 0
VOICE CHANGE: 0

## 2020-10-01 RX ORDER — SODIUM, POTASSIUM,MAG SULFATES 17.5-3.13G
SOLUTION, RECONSTITUTED, ORAL ORAL
Qty: 1 KIT | Refills: 0 | Status: SHIPPED | OUTPATIENT
Start: 2020-10-01 | End: 2021-04-22

## 2020-10-19 NOTE — TELEPHONE ENCOUNTER
Patient called 10/19/2020 @ 12:33  And left a message on the voicemail . That she would like to cancel her covid testing and colonoscopy for now and will call back to reschedule when she finds out when she cancel reschedule due to work. Please cancel for now .  Thank You

## 2021-04-22 ENCOUNTER — HOSPITAL ENCOUNTER (OUTPATIENT)
Age: 31
Setting detail: SPECIMEN
Discharge: HOME | End: 2021-04-22
Payer: OTHER GOVERNMENT

## 2021-04-22 ENCOUNTER — OFFICE VISIT (OUTPATIENT)
Dept: OBGYN CLINIC | Age: 31
End: 2021-04-22
Payer: OTHER GOVERNMENT

## 2021-04-22 VITALS
WEIGHT: 239.8 LBS | BODY MASS INDEX: 37.64 KG/M2 | HEIGHT: 67 IN | SYSTOLIC BLOOD PRESSURE: 104 MMHG | DIASTOLIC BLOOD PRESSURE: 70 MMHG

## 2021-04-22 DIAGNOSIS — Z01.419 ENCOUNTER FOR GYNECOLOGICAL EXAMINATION: Primary | ICD-10-CM

## 2021-04-22 DIAGNOSIS — L98.8 SKIN LESION OF BREAST: ICD-10-CM

## 2021-04-22 DIAGNOSIS — Z30.431 IUD CHECK UP: ICD-10-CM

## 2021-04-22 PROCEDURE — 99395 PREV VISIT EST AGE 18-39: CPT | Performed by: NURSE PRACTITIONER

## 2021-04-22 RX ORDER — VALACYCLOVIR HYDROCHLORIDE 1 G/1
1000 TABLET, FILM COATED ORAL DAILY
Qty: 5 TABLET | Refills: 5 | Status: SHIPPED | OUTPATIENT
Start: 2021-04-22 | End: 2021-04-27

## 2021-04-22 ASSESSMENT — ENCOUNTER SYMPTOMS
DIARRHEA: 0
BACK PAIN: 0
SHORTNESS OF BREATH: 0
ABDOMINAL PAIN: 0
ABDOMINAL DISTENTION: 0
CONSTIPATION: 0
COUGH: 0

## 2021-04-22 NOTE — PROGRESS NOTES
HPI:     Anthony Polk a 35 y.o. female who presents today for:  Chief Complaint   Patient presents with    Annual Exam     last pap 5/3/19-WNL        HPI  Here for annual exam. Works as an - waiting to see if she is getting a promotion. Has 1 son who is 4. IUD due to remove/replace. Eating healthy and has been working out regularly. Hs been having some skin eruptions on her left breast (primarily) for about 6 weeks. She reports that her \"pores seem to open up and become filled with pus. \" and spontaneously resolve. Wondering if she should have any breast imaging. Denies immediate fam hx breast ca. Discussed using antibacterial soap on her breasts, washing sports bras in hot water and taking pictures of the initial eruptin and progression of lesions. May treat x 1 with bactrim or consider referral to derm. CBE negative. Pt agreeable to POC. GYNECOLOGICHISTORY:  MenstrualHistory: No LMP recorded. Patient has had an implant. Sexually Transmitted Infections: No  History of Ectopic Pregnancy:No  Menarche: 13  Denies VB w/IUD  Sexually active: yes, not currently  Dyspareunia: none    Current Outpatient Medications   Medication Sig Dispense Refill    Na Sulfate-K Sulfate-Mg Sulf (SUPREP BOWEL PREP KIT) 17.5-3.13-1.6 GM/177ML SOLN Use as directed per instructions provided by physician office 1 kit 0    polyethylene glycol (GLYCOLAX) 17 GM/SCOOP powder Take 17 g by mouth daily as needed      hydrocortisone (ANUSOL-HC) 25 MG suppository Place 1 suppository rectally every 12 hours 30 suppository 2     No current facility-administered medications for this visit.       Allergies   Allergen Reactions    Percocet [Oxycodone-Acetaminophen] Nausea And Vomiting     Other reaction(s): severe Nausea And Vomiting; syncope    Vancomycin Hives       Past Medical History:   Diagnosis Date    Abnormal Pap smear of cervix     BRBPR (bright red blood per rectum)     Chronic constipation     History of anal fissures     History of hemorrhoids     HPV in female     HSV infection      Denies family history of breast, ovarian, uterus, colon CA     Past Surgical History:   Procedure Laterality Date    COLPOSCOPY      INSERTION OF CONTRACEPTIVE CAPSULE  2011    Nexplanon     INTRAUTERINE DEVICE INSERTION  2017    Mirena     REMOVAL OF CONTRACEPTIVE CAPSULE  2011    TONSILLECTOMY      1476    UMBILICAL HERNIA REPAIR      WISDOM TOOTH EXTRACTION      2010     Family History   Problem Relation Age of Onset    Obesity Mother     Diabetes Mother         borderline     Cervical Cancer Mother 37    Alcohol Abuse Father     Other Father         myeloproliferative disorder    No Known Problems Maternal Grandmother     Obesity Maternal Grandfather     Kidney Disease Maternal Grandfather     Substance Abuse Sister     Thyroid Disease Brother     Breast Cancer Paternal Grandmother     Other Brother         hearing loss      Social History     Tobacco Use    Smoking status: Never Smoker    Smokeless tobacco: Never Used   Substance Use Topics    Alcohol use: No        Subjective:      Review of Systems   Constitutional: Negative for appetite change and fatigue. HENT: Negative for congestion and hearing loss. Eyes: Negative for visual disturbance. Respiratory: Negative for cough and shortness of breath. Cardiovascular: Negative for chest pain and palpitations. Gastrointestinal: Negative for abdominal distention, abdominal pain, constipation and diarrhea. Genitourinary: Negative for flank pain, frequency, menstrual problem, pelvic pain and vaginal discharge. Musculoskeletal: Negative for back pain. Neurological: Negative for syncope and headaches. Psychiatric/Behavioral: Negative for behavioral problems. Objective:     Ht 5' 6.5\" (1.689 m)   Wt 239 lb 12.8 oz (108.8 kg)   Breastfeeding No   BMI 38.12 kg/m²   Physical Exam  Constitutional:       Appearance: She is well-developed.    HENT: Head: Normocephalic. Eyes:      Extraocular Movements: Extraocular movements intact. Conjunctiva/sclera: Conjunctivae normal.   Neck:      Musculoskeletal: Normal range of motion. Thyroid: No thyromegaly. Trachea: No tracheal deviation. Pulmonary:      Effort: Pulmonary effort is normal. No respiratory distress. Chest:      Breasts: Breasts are symmetrical.         Right: No inverted nipple. Left: No inverted nipple, mass, nipple discharge, skin change or tenderness. Abdominal:      General: There is no distension. Palpations: Abdomen is soft. There is no mass. Tenderness: There is no abdominal tenderness. Genitourinary:     Labia:         Right: No rash or lesion. Left: No rash or lesion. Vagina: No vaginal discharge or tenderness. Cervix: No cervical motion tenderness, discharge or friability. Uterus: Not deviated, not enlarged and not fixed. Adnexa:         Right: No mass, tenderness or fullness. Left: No mass, tenderness or fullness. Musculoskeletal: Normal range of motion. General: No tenderness. Skin:     General: Skin is warm and dry. Neurological:      General: No focal deficit present. Mental Status: She is alert and oriented to person, place, and time. Mental status is at baseline. Psychiatric:         Mood and Affect: Mood normal.         Behavior: Behavior normal.         Thought Content: Thought content normal.         Judgment: Judgment normal.       IUD strings visualized and appropriate length    Assessment:     1. Encounter for gynecological examination    2. IUD check up          Plan:   1. Pap collected. Discussed new pap smear guidelines. Desires re-pap in 1 year. 2. Breast self exam reviewed  3. Calcium and Vitamin D dosing reviewed. 4. Seat belt use reviewed  5. Family planning reviewed.   Birth control IUD  Antibacterial soap on breasts, continue to monitor  Will call for meds in March for IUD remove/replace    Electronicallysigned by Chinyere Solanor on 4/22/2021

## 2021-04-26 LAB — CYTOLOGY REPORT: NORMAL

## 2021-05-10 ENCOUNTER — HOSPITAL ENCOUNTER (OUTPATIENT)
Dept: GENERAL RADIOLOGY | Age: 31
Discharge: HOME | End: 2021-05-12
Payer: OTHER GOVERNMENT

## 2021-05-10 ENCOUNTER — OFFICE VISIT (OUTPATIENT)
Dept: FAMILY MEDICINE CLINIC | Age: 31
End: 2021-05-10
Payer: OTHER GOVERNMENT

## 2021-05-10 ENCOUNTER — HOSPITAL ENCOUNTER (OUTPATIENT)
Age: 31
Discharge: HOME | End: 2021-05-12
Payer: OTHER GOVERNMENT

## 2021-05-10 VITALS
WEIGHT: 236.6 LBS | TEMPERATURE: 97 F | SYSTOLIC BLOOD PRESSURE: 110 MMHG | OXYGEN SATURATION: 97 % | DIASTOLIC BLOOD PRESSURE: 74 MMHG | HEART RATE: 92 BPM | HEIGHT: 66 IN | BODY MASS INDEX: 38.02 KG/M2

## 2021-05-10 DIAGNOSIS — G89.29 CHRONIC PAIN OF LEFT KNEE: ICD-10-CM

## 2021-05-10 DIAGNOSIS — M25.562 CHRONIC PAIN OF LEFT KNEE: ICD-10-CM

## 2021-05-10 DIAGNOSIS — M25.562 CHRONIC PAIN OF LEFT KNEE: Primary | ICD-10-CM

## 2021-05-10 DIAGNOSIS — G89.29 CHRONIC PAIN OF LEFT KNEE: Primary | ICD-10-CM

## 2021-05-10 PROCEDURE — 99213 OFFICE O/P EST LOW 20 MIN: CPT | Performed by: NURSE PRACTITIONER

## 2021-05-10 PROCEDURE — 73562 X-RAY EXAM OF KNEE 3: CPT

## 2021-05-10 ASSESSMENT — ENCOUNTER SYMPTOMS
ALLERGIC/IMMUNOLOGIC NEGATIVE: 1
DIARRHEA: 0
VOMITING: 0
COLOR CHANGE: 0
COUGH: 0
SHORTNESS OF BREATH: 0
EYES NEGATIVE: 1
GASTROINTESTINAL NEGATIVE: 1
NAUSEA: 0
RESPIRATORY NEGATIVE: 1

## 2021-05-10 ASSESSMENT — PATIENT HEALTH QUESTIONNAIRE - PHQ9
SUM OF ALL RESPONSES TO PHQ9 QUESTIONS 1 & 2: 0
2. FEELING DOWN, DEPRESSED OR HOPELESS: 0
SUM OF ALL RESPONSES TO PHQ QUESTIONS 1-9: 0

## 2021-05-10 NOTE — PROGRESS NOTES
[facility]  [address] [place] [address]  Dept: [phone]    Isiah Long is a 35 y.o. female who presents today for her medical conditions/complaintsas noted below. Isiah Long is here today c/o Knee Pain (left)    Past Medical History:   Diagnosis Date    Abnormal Pap smear of cervix     BRBPR (bright red blood per rectum)     Chronic constipation     History of anal fissures     History of hemorrhoids     HPV in female     HSV infection       Past Surgical History:   Procedure Laterality Date    COLONOSCOPY  10/2020    COLPOSCOPY      INSERTION OF CONTRACEPTIVE CAPSULE  2011    Nexplanon     INTRAUTERINE DEVICE INSERTION  2017    Mirena     REMOVAL OF CONTRACEPTIVE CAPSULE  2011    TONSILLECTOMY      5102    UMBILICAL HERNIA REPAIR      WISDOM TOOTH EXTRACTION      2010       Family History   Problem Relation Age of Onset    Obesity Mother     Diabetes Mother         borderline     Cervical Cancer Mother 37    Alcohol Abuse Father     Other Father         myeloproliferative disorder    No Known Problems Maternal Grandmother     Obesity Maternal Grandfather     Kidney Disease Maternal Grandfather     Substance Abuse Sister     Thyroid Disease Brother     Breast Cancer Paternal Grandmother     Other Brother         hearing loss        Social History     Tobacco Use    Smoking status: Never Smoker    Smokeless tobacco: Never Used   Substance Use Topics    Alcohol use: No      Current Outpatient Medications   Medication Sig Dispense Refill    polyethylene glycol (GLYCOLAX) 17 GM/SCOOP powder Take 17 g by mouth daily as needed       No current facility-administered medications for this visit.       Allergies   Allergen Reactions    Percocet [Oxycodone-Acetaminophen] Nausea And Vomiting     Other reaction(s): severe Nausea And Vomiting; syncope    Vancomycin Hives         HPI:     Knee Pain   The incident occurred more than 1 week ago (Left knee pain, ongoing 10+ years, pain is intermittent, started new exercise program within the past 8 weeks that includes a lot of squatting/lunging). There was no injury mechanism (repetitive activity, no acute trauma). The pain is present in the left knee. The quality of the pain is described as aching. The pain is moderate. The pain has been intermittent since onset. Pertinent negatives include no inability to bear weight, loss of motion, loss of sensation, muscle weakness, numbness or tingling. Associated symptoms comments: No swelling or redness . She reports no foreign bodies present. Exacerbated by: lunging, jumping (does OK with ellipical & walking) She has tried rest for the symptoms. The treatment provided moderate relief. Has never been evaluated for this in the past   Does not follow w/ Ortho  Started [address] & has lost 17 lbs so far    Health Maintenance:      Subjective:     Review of Systems   Constitutional: Negative for appetite change, chills, diaphoresis and fever. HENT: Negative. Eyes: Negative. Respiratory: Negative. Negative for cough and shortness of breath. Cardiovascular: Negative. Negative for chest pain and leg swelling. Gastrointestinal: Negative. Negative for diarrhea, nausea and vomiting. Endocrine: Negative. Genitourinary: Negative. Negative for difficulty urinating and dysuria. Musculoskeletal: Positive for arthralgias. Negative for gait problem and joint swelling. Skin: Negative. Negative for color change, pallor, rash and wound. Allergic/Immunologic: Negative. Neurological: Negative. Negative for tingling, facial asymmetry, weakness and numbness. Hematological: Negative. Psychiatric/Behavioral: Negative. Objective:     Vitals:    05/10/21 0704   BP: 110/74   Pulse: 92   Temp: 97 °F (36.1 °C)   SpO2: 97%       Body mass index is 38.19 kg/m². Physical Exam  Constitutional:       General: She is not in acute distress.

## 2021-05-14 ENCOUNTER — TELEPHONE (OUTPATIENT)
Dept: ORTHOPEDIC SURGERY | Age: 31
End: 2021-05-14

## 2021-05-18 ENCOUNTER — OFFICE VISIT (OUTPATIENT)
Dept: ORTHOPEDIC SURGERY | Age: 31
End: 2021-05-18
Payer: OTHER GOVERNMENT

## 2021-05-18 VITALS
HEIGHT: 66 IN | BODY MASS INDEX: 36.32 KG/M2 | HEART RATE: 87 BPM | DIASTOLIC BLOOD PRESSURE: 91 MMHG | WEIGHT: 226 LBS | SYSTOLIC BLOOD PRESSURE: 123 MMHG

## 2021-05-18 DIAGNOSIS — M22.2X2 PATELLOFEMORAL SYNDROME OF LEFT KNEE: Primary | ICD-10-CM

## 2021-05-18 PROCEDURE — 99203 OFFICE O/P NEW LOW 30 MIN: CPT | Performed by: FAMILY MEDICINE

## 2021-05-27 ENCOUNTER — HOSPITAL ENCOUNTER (OUTPATIENT)
Dept: PHYSICAL THERAPY | Facility: CLINIC | Age: 31
Setting detail: THERAPIES SERIES
Discharge: HOME | End: 2021-05-27
Payer: OTHER GOVERNMENT

## 2021-05-27 PROCEDURE — 97161 PT EVAL LOW COMPLEX 20 MIN: CPT

## 2021-05-27 PROCEDURE — 97110 THERAPEUTIC EXERCISES: CPT

## 2021-05-27 NOTE — CONSULTS
Decrease pain levels 4/10 with ADls  2. ? ROM: Increase flexibility and AROM limitations throughout to equal bilat to reduce difficulty with ADLs  3. ? Strength: Increase LE strength to 5/5 MMT   4. Independent with Home Exercise Programs      LTG: (to be met in 20 treatments)  1. Improve score on assessment tool from LEFS 20% impairment to less than 10% impairment   2. Reduce pain levels to 2/10 or less with exercise/ADLs                   Patient goals: decrease pain     Rehab Potential:  [x] Good  [] Fair  [] Poor   Suggested Professional Referral:  [x] No  [] Yes:  Barriers to Goal Achievement[de-identified]  [x] No  [] Yes:  Domestic Concerns:  [x] No  [] Yes:    Pt. Education:  [x] Plans/Goals, Risks/Benefits discussed  [x] Home exercise program    Method of Education: [x] Verbal  [x] Demo  [x] Written  Comprehension of Education:  [x] Verbalizes understanding. [x] Demonstrates understanding. [x] Needs Review. [] Demonstrates/verbalizes understanding of HEP/Ed previously given. Treatment Plan:  [x] Therapeutic Exercise    [] Aquatic Therapy   [x] Manual Therapy     [] Electrical Stimulation  [x] Instruction in HEP      [] Lumbar/Cervical Traction  [x] Neuromuscular Re-education [] Cold/hotpack  [] Iontophoresis: 4 mg/mL  [x] Vasocompression (GameReady)                    Dexamethasone Sodium  [] Gait Training             Phosphate 40-80 mAmin         []  Medication allergies reviewed for use of    Dexamethasone Sodium Phosphate 4mg/ml     with iontophoresis treatments. Pt is not allergic.     Frequency:  2 x/week for 20 visits    Todays Treatment:    Exercises:  Exercise    LLE Knee PFS Reps/ Time Weight/ Level Comments         Bike            *SB Calf S      *HS belt S      *SL Hip Abd      *Clamshells      *prone hip ext      Bridges            SLS Rebounder      Balance board      4 way hip TBand      TKE TBand      TGym squats       TGym HR                                    Other: LLE calf hypervolt, MFR    Specific Instructions for next treatment: advance hip/core strength along with balance and calf flexibility     Evaluation Complexity:  History (Personal factors, comorbidities) [x] 0 [] 1-2 [] 3+   Exam (limitations, restrictions) [x] 1-2 [] 3 [] 4+   Clinical presentation (progression) [x] Stable [] Evolving  [] Unstable   Decision Making [x] Low [] Moderate [] High    [x] Low Complexity [] Moderate Complexity [] High Complexity       Treatment Charges: Mins Units   [x] Evaluation       [x]  Low       []  Moderate       []  High 30 1   []  Modalities     [x]  Ther Exercise 10 1   []  Manual Therapy     []  Ther Activities     []  Aquatics     []  Vasocompression     []  Other       TOTAL TREATMENT TIME: 44    Time in:0900   Time HYA:[id number]    Electronically signed by: Tomas Price PT        Physician Signature:________________________________Date:__________________  By signing above or cosigning this note, I have reviewed this plan of care and certify a need for medically necessary rehabilitation services.      *PLEASE SIGN ABOVE AND FAX BACK ALL PAGES*

## 2021-06-03 ENCOUNTER — HOSPITAL ENCOUNTER (OUTPATIENT)
Dept: PHYSICAL THERAPY | Facility: CLINIC | Age: 31
Setting detail: THERAPIES SERIES
End: 2021-06-03
Payer: OTHER GOVERNMENT

## 2021-06-10 ENCOUNTER — HOSPITAL ENCOUNTER (OUTPATIENT)
Dept: PHYSICAL THERAPY | Facility: CLINIC | Age: 31
Setting detail: THERAPIES SERIES
Discharge: HOME | End: 2021-06-10
Payer: OTHER GOVERNMENT

## 2021-06-10 PROCEDURE — 97110 THERAPEUTIC EXERCISES: CPT

## 2021-06-17 ENCOUNTER — HOSPITAL ENCOUNTER (OUTPATIENT)
Dept: PHYSICAL THERAPY | Facility: CLINIC | Age: 31
Setting detail: THERAPIES SERIES
Discharge: HOME | End: 2021-06-17
Payer: OTHER GOVERNMENT

## 2021-06-17 PROCEDURE — 97140 MANUAL THERAPY 1/> REGIONS: CPT

## 2021-06-17 PROCEDURE — 97110 THERAPEUTIC EXERCISES: CPT

## 2021-06-17 NOTE — FLOWSHEET NOTE
[x] [facility]  Outpatient Rehabilitation &  Therapy  [facility]   [place]: [phone]  [place]: [phone]      Physical Therapy Daily Treatment Note    Date:  2021  Patient Name:  Jacklyn Perez    :  1990  MRN: [id number]  Physician: Dr Mendel Reeds, DO                   Insurance: Ty Read (61 v, hard max)  Medical Diagnosis: LLE Knee PFS              Rehab Codes: M22.2X2  Onset date: 2021                               Next 's appt.: na    Visit# / total visits: 3/20    Cancels/No Shows: 0/0    Subjective:    Pain:  [] Yes  [x] No Location: LLE  Pain Rating: (0-10 scale) 0/10  Pain altered Tx:  [x] No  [] Yes  Action:  Comments: Patient notes no pain/soreness at arrival however she did have some soreness int he knee following [place] [facility] Objective:    Exercise     LLE Knee PFS Reps/ Time Weight/ Level Comments             Bike  10'                 SB Calf S  3x30\"       HS S Stool  3x30\"       Calf Inv S  3x30\"           *SL Hip Abd  2x10  orange      *Clamshells  2x10  orange     *Prone hip ext  2x10       Bridges  2x10                 SLS Rebounder         Balance board  5' L2     *4 way hip TBand  x15 Orange     *TKE TBand  10x10\" Green     TGym squats   2x10 L20     TGym HR  2x10 L20                                   Other: bilat calf hypervolt, MFR    Treatment Charges: Mins Units   []  Modalities     []  Ther Exercise 44 3   []  Manual Therapy 10 1   []  Ther Activities     []  Aquatics     []  Vasocompression     []  Other     Total Treatment time 54 4       Assessment: [x] Progressing toward goals. Progressed strength program this visit. Patient reports no pain noted with knee ex's today, added resistance with table hip/gluteal ex's with fatigue but no pain. Instructed to continue with that at home. [] No change.      [] Other:  [x] Patient would continue to benefit from skilled physical therapy services in order to: to increase strength and decrease functional pain. STG: (to be met in 10 treatments)  1. ? Pain: Decrease pain levels 4/10 with ADls  2. ? ROM: Increase flexibility and AROM limitations throughout to equal bilat to reduce difficulty with ADLs  3. ? Strength: Increase LE strength to 5/5 MMT   4. Independent with Home Exercise Programs      LTG: (to be met in 20 treatments)  1. Improve score on assessment tool from LEFS 20% impairment to less than 10% impairment   2. Reduce pain levels to 2/10 or less with exercise/ADLs                  Patient goals: decrease pain     Pt. Education:  [x] Yes  [] No  [x] Reviewed Prior HEP/Ed  Method of Education: [x] Verbal  [] Demo  [x] Written: Access code [email or web address]  HEP review, additios  Comprehension of Education:  [x] Verbalizes understanding. [] Demonstrates understanding. [] Needs review. [x] Demonstrates/verbalizes HEP/Ed previously given. Plan: [x] Continue current frequency toward long and short term goals. [x] Specific Instructions for subsequent treatments: continue strength and stability progressions.        Time In: 0901             Time Out: [address]    Electronically signed by:  Gentry Romero, PT

## 2021-06-24 ENCOUNTER — HOSPITAL ENCOUNTER (OUTPATIENT)
Dept: PHYSICAL THERAPY | Facility: CLINIC | Age: 31
Setting detail: THERAPIES SERIES
Discharge: HOME | End: 2021-06-24
Payer: OTHER GOVERNMENT

## 2021-06-24 NOTE — FLOWSHEET NOTE
[x] [facility]  [facility]  Therapy  [facility]   [place]: [phone]  F: [phone]      Physical Therapy Cancel/No Show note    Date: 2021  Patient: Bala Rizo  : 1990  MRN: [id number]    Cancels/No Shows to date: 1    For today's appointment patient:    []  Cancelled    [] Rescheduled appointment    [x] No-show     Reason given by patient:    []  Patient ill    []  Conflicting appointment    [] No transportation      [] Conflict with work    [] No reason given    [] Weather related    [] COVID-19    [] Other:      Comments:        [] Next appointment was confirmed    Electronically signed by: Ceasar Conner PTA

## 2021-07-01 ENCOUNTER — HOSPITAL ENCOUNTER (OUTPATIENT)
Dept: PHYSICAL THERAPY | Facility: CLINIC | Age: 31
Setting detail: THERAPIES SERIES
Discharge: HOME | End: 2021-07-01
Payer: OTHER GOVERNMENT

## 2021-07-01 NOTE — FLOWSHEET NOTE
[x] [facility]  Outpatient Rehabilitation &  Therapy  [facility]   [place]: [phone]  F: [phone]      Physical Therapy Cancel/No Show note    Date: 2021  Patient: Harsha Shafer  : 1990  MRN: [id number]    Cancels/No Shows to date:     For today's appointment patient:    [x]  Cancelled    [] Rescheduled appointment    [] No-show     Reason given by patient:    []  Patient ill    []  Conflicting appointment    [] No transportation      [] Conflict with work    [] No reason given    [] Weather related    [] [id number]    [x] Other:      Comments: Patient had wrong appointment time.         [x] Next appointment was confirmed    Electronically signed by: Akira Muniz PTA

## 2021-07-08 ENCOUNTER — HOSPITAL ENCOUNTER (OUTPATIENT)
Dept: PHYSICAL THERAPY | Facility: CLINIC | Age: 31
Setting detail: THERAPIES SERIES
Discharge: HOME | End: 2021-07-08
Payer: OTHER GOVERNMENT

## 2021-07-08 PROCEDURE — 97110 THERAPEUTIC EXERCISES: CPT

## 2021-07-08 PROCEDURE — 97140 MANUAL THERAPY 1/> REGIONS: CPT

## 2021-07-08 NOTE — FLOWSHEET NOTE
[x] [facility]  Outpatient Rehabilitation &  Therapy  [facility]   [place]: [phone]  F: [phone]      Physical Therapy Daily Treatment Note    Date:  2021  Patient Name:  Chantell Miguel    :  1990  MRN: [id number]  Physician: Dr Joseluis Dougherty DO                   Insurance: [employer] (61 v, hard max)  Medical Diagnosis: LLE Knee PFS              Rehab Codes: M22.2X2  Onset date: 2021                               Next 's appt.: na    Visit# / total visits:     Cancels/No Shows: 0/0    Subjective:    Pain:  [] Yes  [x] No Location: LLE  Pain Rating: (0-10 scale) 0/10  Pain altered Tx:  [x] No  [] Yes  Action:  Comments: Patient arrives stating no pain upon arrival. Pt reports she has been getting \"popping\" below her L knee cap but it's not painful. Pt reports the popping gets worse with biking and walking. Pt states she was moving items out of her sisters house into a new house last Saturday and she thinks she irritated her symptoms so she hasn't done her HEP like she should because she wanted a rest period. Objective:    Exercise     LLE Knee PFS Reps/ Time Weight/ Level Comments             Treadmill  10'                 SB Calf S  3x30\"       HS S Stool  3x30\"             Calf Inv S  3x30\"     *SLR    x10     *SL Hip Abd  2x10  orange   not today   *Clamshells  2x10  orange  not today   *Prone hip ext  2x10    not today   Bridges  2x10    not today             SLS Rebounder fwd x20 bilat     Balance board  5' L2     *4 way hip TBand  x15 Orange     *TKE TBand  10x10\" Green     TGym squats   2x10 L20     TGym HR  2x10 L20                                   Other: L calf hypervolt, MFR    Treatment Charges: Mins Units   []  Modalities     [x]  Ther Exercise 32 2   [x]  Manual Therapy 10 1   []  Ther Activities     []  Aquatics     []  Vasocompression     []  Other     Total Treatment time 42 3       Assessment: [x] Progressing toward goals.  Pt demonstrates quad dominance with total gym squats and notes \"favoring RLE\" by shifting her body weight onto R side, advised pt to push through mid-foot and heel for increase glute/hamstring activation and to place even amount through each LE to avoid compensation on LLE. Pt demonstrates weakness in bilateral quadriceps during 4-way hip due to inability to maintain terminal knee extension post instruction. Added SLR to mat program and HEP, pt notes \"this is the hardest exercise yet. \" Will continue to progress LE strength as able. [] No change. [] Other:  [x] Patient would continue to benefit from skilled physical therapy services in order to: to increase strength and decrease functional pain. STG: (to be met in 10 treatments)  1. ? Pain: Decrease pain levels 4/10 with ADls  2. ? ROM: Increase flexibility and AROM limitations throughout to equal bilat to reduce difficulty with ADLs  3. ? Strength: Increase LE strength to 5/5 MMT   4. Independent with Home Exercise Programs      LTG: (to be met in 20 treatments)  1. Improve score on assessment tool from LEFS 20% impairment to less than 10% impairment   2. Reduce pain levels to 2/10 or less with exercise/ADLs                  Patient goals: decrease pain     Pt. Education:  [x] Yes  [] No  [x] Reviewed Prior HEP/Ed  Method of Education: [x] Verbal  [] Demo  [x] Written: Access code [email or web address]  HEP review, additios  Comprehension of Education:  [x] Verbalizes understanding. [x] Demonstrates understanding. [] Needs review. [x] Demonstrates/verbalizes HEP/Ed previously given. Plan: [x] Continue current frequency toward long and short term goals. [x] Specific Instructions for subsequent treatments: continue strength and stability progressions.        Time In: 9:00am             Time Out: 9:52am    Electronically signed by:  Jazmin Garcia PTA

## 2021-07-14 ENCOUNTER — HOSPITAL ENCOUNTER (OUTPATIENT)
Dept: PHYSICAL THERAPY | Facility: CLINIC | Age: 31
Setting detail: THERAPIES SERIES
Discharge: HOME | End: 2021-07-14
Payer: OTHER GOVERNMENT

## 2021-07-14 PROCEDURE — 97110 THERAPEUTIC EXERCISES: CPT

## 2021-07-14 PROCEDURE — 97140 MANUAL THERAPY 1/> REGIONS: CPT

## 2021-07-14 NOTE — FLOWSHEET NOTE
continue to benefit from skilled physical therapy services in order to: to increase strength and decrease functional pain. STG: (to be met in 10 treatments)  1. ? Pain: Decrease pain levels 4/10 with ADls  2. ? ROM: Increase flexibility and AROM limitations throughout to equal bilat to reduce difficulty with ADLs  3. ? Strength: Increase LE strength to 5/5 MMT   4. Independent with Home Exercise Programs      LTG: (to be met in 20 treatments)  1. Improve score on assessment tool from LEFS 20% impairment to less than 10% impairment   2. Reduce pain levels to 2/10 or less with exercise/ADLs                  Patient goals: decrease pain      Pt. Education:  [x] Yes  [] No  [x] Reviewed Prior HEP/Ed  Method of Education: [x] Verbal  [] Demo  [] Written:   Comprehension of Education:  [x] Verbalizes understanding. [] Demonstrates understanding. [] Needs review. [x] Demonstrates/verbalizes HEP/Ed previously given. Plan: [x] Continue current frequency toward long and short term goals. [x] Specific Instructions for subsequent treatments: continue strength and stability progressions.        Time In: 0503              Time Out: 0940    Electronically signed by:  Chino De La Cruz PTA

## 2021-07-22 ENCOUNTER — HOSPITAL ENCOUNTER (OUTPATIENT)
Dept: PHYSICAL THERAPY | Facility: CLINIC | Age: 31
Setting detail: THERAPIES SERIES
Discharge: HOME | End: 2021-07-22
Payer: OTHER GOVERNMENT

## 2021-07-22 PROCEDURE — 97140 MANUAL THERAPY 1/> REGIONS: CPT

## 2021-07-22 PROCEDURE — 97110 THERAPEUTIC EXERCISES: CPT

## 2021-07-22 NOTE — FLOWSHEET NOTE
[x] [facility]""  Outpatient Rehabilitation &  Therapy  [facility]   [place]: [phone]  F: [phone]      Physical Therapy Daily Treatment Note    Date:  2021  Patient Name:  Tracey Cheadle    :  1990  MRN: [id number]  Physician: Dr Delia Nelson DO                   Insurance: [employer] (61 v, hard max)  Medical Diagnosis: LLE Knee PFS              Rehab Codes: M22.2X2  Onset date: 2021                               Next 's appt.: na    Visit# / total visits:     Cancels/No Shows: 0/0    Subjective:    Pain:  [] Yes  [x] No Location: LLE  Pain Rating: (0-10 scale) 0/10  Pain altered Tx:  [x] No  [] Yes  Action:  Comments: Patient arrived stating no knee pain today and she's feeling pretty good. Pt reports she has been driving a lot lately up to 11 hours a day which has not helped her calf tightness or knee stiffness. Pt reports she is completing SLR's at home but they seem harder to complete this week compared to last week. Objective:    Exercise     LLE Knee PFS Reps/ Time Weight/ Level Comments             Treadmill  10'                 SB Calf S  3x30\"       HS S Stool  3x30\"             Calf Inv S  3x30\"     *SLR  2x10     *SL Hip Abd  2x10  orange     *Clamshells  2x10  orange    *Prone hip ext  2x10      Bridges  2x10                SLS Rebounder 3-way x10 bilat     Balance board  5' L2     *4 way hip TBand  x15 Green      *TKE TBand  10x10\" Green     TGym squats   2x10 L20     TGym HR  2x10 L20                                   Other: L calf hypervolt, MFR    Treatment Charges: Mins Units   []  Modalities     [x]  Ther Exercise 30 2   [x]  Manual Therapy 10 1   []  Ther Activities     []  Aquatics     []  Vasocompression     []  Other     Total Treatment time 40 3       Assessment: [x] Progressing toward goals. Pt notes a \"bruising\" sensation with TKE on LLE, therefore, held due to increased discomfort.  Pt with continued gastroc tightness, applied manual followed by aggressive stretching. Advised pt to tie a knot in her thera-band and throw it under the door to perform 4-way hip, pt understands. Pt with continue difficulty with SLR requiring verbal cues to maintain TKE during lowering phase. Pt with significant fatigue post SLR's. Will continue with strengthening program next visit. [] No change. [] Other:  [x] Patient would continue to benefit from skilled physical therapy services in order to: to increase strength and decrease functional pain. STG: (to be met in 10 treatments)  1. ? Pain: Decrease pain levels 4/10 with ADls  2. ? ROM: Increase flexibility and AROM limitations throughout to equal bilat to reduce difficulty with ADLs  3. ? Strength: Increase LE strength to 5/5 MMT   4. Independent with Home Exercise Programs      LTG: (to be met in 20 treatments)  1. Improve score on assessment tool from LEFS 20% impairment to less than 10% impairment   2. Reduce pain levels to 2/10 or less with exercise/ADLs                  Patient goals: decrease pain      Pt. Education:  [x] Yes  [] No  [x] Reviewed Prior HEP/Ed  Method of Education: [x] Verbal  [] Demo  [] Written:   Comprehension of Education: Educated pt to continue practicing SLR and stretching gastroc. [x] Verbalizes understanding. [] Demonstrates understanding. [] Needs review. [x] Demonstrates/verbalizes HEP/Ed previously given. Plan: [x] Continue current frequency toward long and short term goals. [x] Specific Instructions for subsequent treatments: continue strength and stability progressions.        Time In: 10:00am             Time Out: 10:50am    Electronically signed by:  Meenu Lott PTA

## 2021-07-29 ENCOUNTER — HOSPITAL ENCOUNTER (OUTPATIENT)
Dept: PHYSICAL THERAPY | Facility: CLINIC | Age: 31
Setting detail: THERAPIES SERIES
Discharge: HOME | End: 2021-07-29
Payer: OTHER GOVERNMENT

## 2021-07-29 PROCEDURE — 97110 THERAPEUTIC EXERCISES: CPT

## 2021-07-29 PROCEDURE — 97140 MANUAL THERAPY 1/> REGIONS: CPT

## 2021-07-29 NOTE — FLOWSHEET NOTE
[x] [facility]  Outpatient Rehabilitation &  Therapy  [facility]   [place]: [phone]  F: [phone]      Physical Therapy Daily Treatment Note    Date:  2021  Patient Name:  Charl Riedel    :  1990  MRN: [id number]  Physician: Dr Pamela Chaidez DO                   Insurance: [employer] (61 v, hard max)  Medical Diagnosis: LLE Knee PFS              Rehab Codes: M22.2X2  Onset date: 2021                               Next 's appt.: na    Visit# / total visits:     Cancels/No Shows: 0/0    Subjective:    Pain:  [] Yes  [x] No Location: LLE  Pain Rating: (0-10 scale) 0/10  Pain altered Tx:  [x] No  [] Yes  Action:  Comments: Patient arrived noting no pain this date. Objective:    Exercise     LLE Knee PFS Reps/ Time Weight/ Level Comments             Treadmill  10'                 SB Calf S  3x30\"       HS S Stool  3x30\"             Calf Inv S  3x30\"     *SLR  2x10     *SL Hip Abd  2x10  orange     *Clamshells  2x10  orange    *Prone hip ext  2x10      Bridges  2x10                SLS Rebounder 3-way x20 bilat     Balance board  5' L2     *4 way hip TBand  x15 Green      *TKE TBand  10x10\" Green     TGym squats   2x10 L20     TGym HR  2x10 L20                                   Other: L calf hypervolt, MFR    Treatment Charges: Mins Units   []  Modalities     [x]  Ther Exercise 30 2   [x]  Manual Therapy 10 1   []  Ther Activities     []  Aquatics     []  Vasocompression     []  Other     Total Treatment time 40 3       Assessment: [x] Progressing toward goals. Continued strength progressions this date with minimal fatigue noted and no increase in pain. Decreased tension noted with manual this date. Will continue progressions per patients tolerance. [] No change. [] Other:  [x] Patient would continue to benefit from skilled physical therapy services in order to: to increase strength and decrease functional pain.      STG: (to be met in 10 treatments)  1. ? Pain: Decrease pain levels 4/10 with ADls  2. ? ROM: Increase flexibility and AROM limitations throughout to equal bilat to reduce difficulty with ADLs  3. ? Strength: Increase LE strength to 5/5 MMT   4. Independent with Home Exercise Programs      LTG: (to be met in 20 treatments)  1. Improve score on assessment tool from LEFS 20% impairment to less than 10% impairment   2. Reduce pain levels to 2/10 or less with exercise/ADLs                  Patient goals: decrease pain      Pt. Education:  [x] Yes  [] No  [x] Reviewed Prior HEP/Ed  Method of Education: [x] Verbal  [] Demo  [] Written:   Comprehension of Education: Reviewed current HEP with good understanding, stressed importance of continued stretching. [x] Verbalizes understanding. [] Demonstrates understanding. [] Needs review. [x] Demonstrates/verbalizes HEP/Ed previously given. Plan: [x] Continue current frequency toward long and short term goals. [x] Specific Instructions for subsequent treatments: continue strength and stability progressions.        Time In: 1000              Time Out: 1050    Electronically signed by:  Ksenia Peoples PTA

## 2021-08-07 RX ORDER — VALACYCLOVIR HYDROCHLORIDE 500 MG/1
500 TABLET, FILM COATED ORAL DAILY
Qty: 30 TABLET | Refills: 0 | Status: SHIPPED | OUTPATIENT
Start: 2021-08-07 | End: 2021-10-22

## 2021-08-12 ENCOUNTER — HOSPITAL ENCOUNTER (OUTPATIENT)
Dept: PHYSICAL THERAPY | Facility: CLINIC | Age: 31
Setting detail: THERAPIES SERIES
Discharge: HOME | End: 2021-08-12
Payer: OTHER GOVERNMENT

## 2021-08-12 PROCEDURE — 97140 MANUAL THERAPY 1/> REGIONS: CPT

## 2021-08-12 PROCEDURE — 97110 THERAPEUTIC EXERCISES: CPT

## 2021-08-12 NOTE — FLOWSHEET NOTE
[x] [facility]  Outpatient Rehabilitation &  Therapy  [facility]   [place]: [phone]  F: [phone]      Physical Therapy Daily Treatment Note    Date:  2021  Patient Name:  Blake Kiran    :  1990  MRN: [id number]  Physician: Dr Javier Canela DO                   Insurance: [employer] (61 v, hard max)  Medical Diagnosis: LLE Knee PFS              Rehab Codes: M22.2X2  Onset date: 2021                               Next 's appt.: na    Visit# / total visits:     Cancels/No Shows: 0/0    Subjective:    Pain:  [x] Yes  [] No Location: LLE  Pain Rating: (0-10 scale) 4/10  Pain altered Tx:  [x] No  [] Yes  Action:  Comments: Patient arrived noting that she fell on her knee over vacation. States had problems bearing weight, today is the first day that she can walk without significant pain. Objective:    Exercise     LLE Knee PFS Reps/ Time Weight/ Level Comments             Treadmill  10'                 SB Calf S  3x30\"       HS S Stool  3x30\"             Calf Inv S  3x30\"     *SLR  2x10     *SL Hip Abd  2x10  orange     *Clamshells  2x10  orange    *Prone hip ext  2x10      Bridges  2x10                SLS Rebounder 3-way x20 bilat     Balance board  5' L2     *4 way hip TBand  x15 Green      *TKE TBand  10x10\" Green     TGym squats   2x10 L20     TGym HR  2x10 L20                                   Other: L calf hypervolt, MFR   DI L piriformis    Treatment Charges: Mins Units   []  Modalities     [x]  Ther Exercise 20 1   [x]  Manual Therapy 15 1   []  Ther Activities     []  Aquatics     []  Vasocompression     []  Other     Total Treatment time 35 2       Assessment: [x] Progressing toward goals. Focused on manual and stretching this date. Patient complained of piriformis pain, addressed with DI to L piriformis with decreased symptoms noted. Will continue to monitor patients symptoms and progress strength program as tolerated. [] No change.      [] Other:  [x] Patient would continue to benefit from skilled physical therapy services in order to: to increase strength and decrease functional pain. STG: (to be met in 10 treatments)  1. ? Pain: Decrease pain levels 4/10 with ADls  2. ? ROM: Increase flexibility and AROM limitations throughout to equal bilat to reduce difficulty with ADLs  3. ? Strength: Increase LE strength to 5/5 MMT   4. Independent with Home Exercise Programs      LTG: (to be met in 20 treatments)  1. Improve score on assessment tool from LEFS 20% impairment to less than 10% impairment   2. Reduce pain levels to 2/10 or less with exercise/ADLs                  Patient goals: decrease pain      Pt. Education:  [x] Yes  [] No  [x] Reviewed Prior HEP/Ed  Method of Education: [x] Verbal  [] Demo  [] Written:   Comprehension of Education:   [x] Verbalizes understanding. [] Demonstrates understanding. [] Needs review. [x] Demonstrates/verbalizes HEP/Ed previously given. Plan: [x] Continue current frequency toward long and short term goals. [x] Specific Instructions for subsequent treatments: continue strength and stability progressions.        Time In: 1000              Time Out: 1040    Electronically signed by:  Evie Mcguire PTA

## 2021-08-26 ENCOUNTER — HOSPITAL ENCOUNTER (OUTPATIENT)
Dept: PHYSICAL THERAPY | Facility: CLINIC | Age: 31
Setting detail: THERAPIES SERIES
Discharge: HOME | End: 2021-08-26
Payer: OTHER GOVERNMENT

## 2021-08-26 NOTE — FLOWSHEET NOTE
[x] [facility]  Outpatient Rehabilitation &  Therapy  [facility]   [place]: [phone]  F: [phone]      Physical Therapy Cancel/No Show note    Date: 2021  Patient: José Miguel Torres  : 1990  MRN: [id number]    Cancels/No Shows to date:     For today's appointment patient:    [x]  Cancelled    [] Rescheduled appointment    [] No-show     Reason given by patient:    []  Patient ill    []  Conflicting appointment    [] No transportation      [] Conflict with work    [x] No reason given    [] Weather related    [] COVID-19    [] Other:      Comments:       [x] Next appointment was confirmed    Electronically signed by: Stephen Aleman PTA

## 2021-10-22 RX ORDER — VALACYCLOVIR HYDROCHLORIDE 500 MG/1
TABLET, FILM COATED ORAL
Qty: 30 TABLET | Refills: 0 | Status: SHIPPED | OUTPATIENT
Start: 2021-10-22 | End: 2021-12-09

## 2021-12-09 RX ORDER — VALACYCLOVIR HYDROCHLORIDE 500 MG/1
TABLET, FILM COATED ORAL
Qty: 30 TABLET | Refills: 0 | Status: SHIPPED | OUTPATIENT
Start: 2021-12-09 | End: 2022-01-13

## 2022-04-27 DIAGNOSIS — Z30.433 ENCOUNTER FOR REMOVAL AND REINSERTION OF IUD: Primary | ICD-10-CM

## 2022-04-27 PROBLEM — L60.0 INGROWING TOENAIL: Status: ACTIVE | Noted: 2022-04-27

## 2022-04-27 PROBLEM — K52.9 GASTROENTERITIS: Status: ACTIVE | Noted: 2022-04-27

## 2022-04-27 PROBLEM — N94.6 DYSMENORRHEA: Status: ACTIVE | Noted: 2022-04-27

## 2022-04-27 PROBLEM — R30.0 DYSURIA: Status: ACTIVE | Noted: 2022-04-27

## 2022-04-27 PROBLEM — D50.9 IRON DEFICIENCY ANEMIA: Status: ACTIVE | Noted: 2022-04-27

## 2022-04-27 PROBLEM — N92.0 MENORRHAGIA: Status: ACTIVE | Noted: 2022-04-27

## 2022-04-27 PROBLEM — R63.5 ABNORMAL WEIGHT GAIN: Status: ACTIVE | Noted: 2022-04-27

## 2022-04-27 PROBLEM — J35.01 CHRONIC TONSILLITIS: Status: ACTIVE | Noted: 2022-04-27

## 2022-04-27 PROBLEM — J32.9 SINUSITIS: Status: ACTIVE | Noted: 2022-04-27

## 2022-04-27 PROBLEM — R19.7 SYMPTOM OF DIARRHEA: Status: ACTIVE | Noted: 2022-04-27

## 2022-04-27 PROBLEM — G47.69: Status: ACTIVE | Noted: 2022-04-27

## 2022-04-27 PROBLEM — M26.30 ANOMALY OF TOOTH POSITION: Status: ACTIVE | Noted: 2022-04-27

## 2022-04-27 RX ORDER — NAPROXEN 500 MG/1
500 TABLET ORAL 2 TIMES DAILY PRN
Qty: 40 TABLET | Refills: 1 | Status: SHIPPED | OUTPATIENT
Start: 2022-04-27 | End: 2022-06-09

## 2022-04-27 RX ORDER — MISOPROSTOL 200 UG/1
200 TABLET ORAL ONCE
Qty: 1 TABLET | Refills: 0 | Status: SHIPPED | OUTPATIENT
Start: 2022-04-27 | End: 2022-06-09

## 2022-05-05 ENCOUNTER — TELEPHONE (OUTPATIENT)
Dept: BARIATRICS/WEIGHT MGMT | Age: 32
End: 2022-05-05

## 2022-05-05 NOTE — TELEPHONE ENCOUNTER
Spoke with patient to get clarification she needs the list of the PAT orders that have the medications given and medications that she can be discharged home with, please [email or web address] [email or web address]

## 2022-05-05 NOTE — TELEPHONE ENCOUNTER
She needs to complete online info session , benefits need to be verified and can have an appt with surgeon after

## 2022-05-05 NOTE — TELEPHONE ENCOUNTER
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

## 2022-05-06 NOTE — TELEPHONE ENCOUNTER
Per dr Li Barrier okay to notify patient post op med can include lovenox, flexeril, phenergan and percocet.  He stated a list of pre op meds can't be provided as it can vary by the anesthesiologist and what their preference is

## 2022-05-11 ENCOUNTER — TELEPHONE (OUTPATIENT)
Dept: BARIATRICS/WEIGHT MGMT | Age: 32
End: 2022-05-11

## 2022-05-13 NOTE — TELEPHONE ENCOUNTER
Online Info Session Completed:  on 4/27/22 okay to schedule with Dr. Dayna Sandy   with [address] [facility]    Patient informed the following: This is NOT a guarantee of payment  When stating that you have a Benefit or Coverage for Bariatric Surgery - that means that you may qualify for the surgery  Bariatric Surgery is considered an elective procedure, patient is responsible to know their benefits . Any information we obtain when calling your insurance  is not  a guarantee of  coverage  and/or  benefit. Appointment Note :   New Patient , [facility],   3   month visits,  PG Fee $200,  Mailed Packet or advised to arrive  early      Remind Patient of $200 Program fee with $ 100 required at Second visit with office on initial dietician visit. Remind Patient they must be nicotine free. They will be tested at the beginning of the program and prior to surgery. Advise Patient Responsible for out of pocket, copay at medical visits, Deductible and coinsurance applied to medical visits and procedure. You will be responsible for any of the following:  · Copays   · Deductibles 350.00  · Co insurances 6500.00  · 85/15    The items mentioned above are indicated or required by your insurance plan. Your deductible and coinsurance are applied to medical visits and procedures. Verified with patient if he or she has had any previous bariatric surgery? no  ( If yes ,advise patient of transfer of care process and program fee)       .

## 2022-05-13 NOTE — TELEPHONE ENCOUNTER
Patient called to check on status of insurance verification, advised patient she will get call back in 24-48 hours . She stated she was advised she would get a call back on Monday, May 9th.  Advised patient 24-48 and surgery specialist is out of the office until Wednesday

## 2022-05-27 PROBLEM — J32.9 SINUSITIS: Status: RESOLVED | Noted: 2022-04-27 | Resolved: 2022-05-27

## 2022-06-09 ENCOUNTER — OFFICE VISIT (OUTPATIENT)
Dept: BARIATRICS/WEIGHT MGMT | Age: 32
End: 2022-06-09
Payer: OTHER GOVERNMENT

## 2022-06-09 VITALS
WEIGHT: 256 LBS | SYSTOLIC BLOOD PRESSURE: 103 MMHG | RESPIRATION RATE: 20 BRPM | HEIGHT: 66 IN | DIASTOLIC BLOOD PRESSURE: 72 MMHG | HEART RATE: 91 BPM | BODY MASS INDEX: 41.14 KG/M2

## 2022-06-09 DIAGNOSIS — K21.9 GASTROESOPHAGEAL REFLUX DISEASE WITHOUT ESOPHAGITIS: Primary | ICD-10-CM

## 2022-06-09 DIAGNOSIS — E66.01 MORBID OBESITY WITH BMI OF 40.0-44.9, ADULT (HCC): ICD-10-CM

## 2022-06-09 PROCEDURE — 99204 OFFICE O/P NEW MOD 45 MIN: CPT | Performed by: SURGERY

## 2022-06-09 NOTE — LETTER
Visit Date: 6/9/2022    Patient: Martir Weir  YOB: 1990    Dear Dr. Gregorio Glaser, APRN - CNP,      I had the pleasure of seeing Ashwini Dinh in the office today for a consult for weight loss surgery. Her current Weight: 256 lb (116.1 kg), which gives her a Body mass index is 41.32 kg/m². .  We had a long discussion regarding surgical options for weight loss and improvement in co-morbidities. She is considering a bariatric  procedure. We will start the preoperative workup, which includes bloodwork, psychological evaluation, support group attendance, and preoperative medical clearance from you. We will also initiate pre-certification for surgery, which requires a letter of medical necessity from you and often office notes documenting a weight history and co-morbidities. Ashwini Dinh will require 3 months of medically supervised visits as specified by the patient's insurance. Thank you for allowing me to participate in the care of your patient. If you have any questions or concerns, please do not hesitate to call.       Sincerely,     Rasheed Carter DO  Director of Bariatric and Minimally Invasive Surgery  [facility] [address], [place], [address] [place] [address] [place]: [phone]  F: [phone]

## 2022-06-15 ENCOUNTER — NURSE ONLY (OUTPATIENT)
Dept: BARIATRICS/WEIGHT MGMT | Age: 32
End: 2022-06-15

## 2022-06-15 NOTE — PROGRESS NOTES
[address] INVASIVE BARIATRIC SURG  [address] [place] [address] [address] [place]  [address] [facility] [address]  Dept: [phone]    SURGICAL WEIGHT MANAGEMENT PROGRAM  PROGRESS NOTE INITIAL EVALUATION     Patient: Fatou Soto        Service Date: 6/9/2022      HPI:     Chief Complaint   Patient presents with    Bariatric, Initial Visit    Weight Loss       The patient is a pleasant 28y.o. year old female  with morbid obesity, who stands Height: 5' 6\" (167.6 cm) tall with a weight of Weight: 256 lb (116.1 kg) , resulting in a BMI of Body mass index is 41.32 kg/m². . The patient suffers from multiple co-morbidities as a result of morbid obesity, including: Dyspnea on Exertion and GERD. She has suffered from obesity for many years. The patient denies  a history of myocardial infarction, deep vein thrombosis, pulmonary embolism, renal failure, hepatic failure and stroke. The patient has failed multiple attempts at non-surgical weight loss, and is now seeking surgical intervention to promote permanent and consistent weight loss. She  has chosen Sleeve Gastrectomy. She is well educated regarding it, as she has recently viewed our weight loss surgery informational seminar .      Medical History:  Past Medical History:   Diagnosis Date    Abnormal Pap smear of cervix     BRBPR (bright red blood per rectum)     Chronic constipation     Heartburn     History of anal fissures     History of hemorrhoids     HPV in female     HSV infection     Stomach problems        Surgical History:  Past Surgical History:   Procedure Laterality Date    COLONOSCOPY  10/2020    COLPOSCOPY      INSERTION OF CONTRACEPTIVE CAPSULE  2011    Nexplanon     INTRAUTERINE DEVICE INSERTION  2017    Mirena     REMOVAL OF CONTRACEPTIVE CAPSULE  2011    TONSILLECTOMY      5237    UMBILICAL HERNIA REPAIR      WISDOM TOOTH EXTRACTION      2010       Family History:      Problem Relation Age of Onset  Obesity Mother     Diabetes Mother         borderline     Cervical Cancer Mother 37    Alcohol Abuse Father     Other Father         myeloproliferative disorder    No Known Problems Maternal Grandmother     Obesity Maternal Grandfather     Kidney Disease Maternal Grandfather     Substance Abuse Sister     Thyroid Disease Brother     Breast Cancer Paternal Grandmother     Other Brother         hearing loss        Social History:   Social History     Tobacco Use    Smoking status: Never Smoker    Smokeless tobacco: Never Used   Vaping Use    Vaping Use: Never used   Substance Use Topics    Alcohol use: Yes     Comment: yearly    Drug use: No       Current Med List:  Current Outpatient Medications   Medication Sig Dispense Refill    valACYclovir (VALTREX) 500 MG tablet TAKE ONE TABLET BY MOUTH DAILY 30 tablet 2    polyethylene glycol (GLYCOLAX) 17 GM/SCOOP powder Take 17 g by mouth daily as needed       Current Facility-Administered Medications   Medication Dose Route Frequency Provider Last Rate Last Admin    levonorgestrel (MIRENA) IUD 52 mg 1 each  1 each IntraUTERine Once GRACE Connor - TEDDY            Allergies   Allergen Reactions    Percocet [Oxycodone-Acetaminophen] Nausea And Vomiting     Other reaction(s): severe Nausea And Vomiting; syncope    Vancomycin Hives       SOCIAL:      This patient is alone for the evaluation today.       [] HIV Risk Factors (i.e.) intravenous drug abuser; at risk sexual behavior; received blood products    [] TB Risk Factors (i.e.) Medically underserved, institutional care, foreign born, endemic area; exposure to active case    [] Hepatitis B&C Risk Factors (i.e.) Received blood transfusion prior to 1992; recreational drug use; high risk sexual behaviors; tattoos or body piercings; contact with blood or needle sticks in the workplace    Comprehension    Ability to grasp concepts and respond to questions:   [x] High   [] Medium   [] Low    Motivation [x] Asks Questions; eager to learn   [] Needs education   [] Extreme anxiety    [] uncooperative   [] Denies need for education    English Speaking Ability    [x] Speaks English well   [x] Reads English well   [x] Understands spoken english    [x] Understands written English   [] No need for interpretive support      [] Might benefit from interpretive support   []  required for all services     REVIEW OF SYSTEMS: (Negative unless marked otherwise)     See review of Systems scanned into media    PRESENT ILLNESS:     Weight Parameters  Weight 256 lb (116.1 kg)   Height 5' 6\" (1.676 m)   BMI Body mass index is 41.32 kg/m².    IBW     EBW               IMMUNIZATION STATUS  Immunization History   Administered Date(s) Administered    Anthrax (BioThrax) 01/05/2015    COVID-19, Moderna, Primary or Immunocompromised, PF, 100mcg/0.5mL 03/19/2021, 04/17/2021    HPV Quadrivalent (Gardasil) 02/27/2014    Hepatitis A 04/20/2010    Hepatitis A/Hepatitis B (Twinrix) 06/10/2014    Hepatitis B 01/05/2015    Influenza A ([id number]) Vaccine IM 01/14/2010    Influenza Virus Vaccine 10/06/2010, 09/25/2012, 11/02/2015, 03/02/2017, 11/21/2017    Influenza, Live, Intranasal, Quadv, (Flumist 2-49 yrs) 10/23/2014    Influenza, Janas Rota, IM, PF (6 mo and older Fluzone, Flulaval, Fluarix, and 3 yrs and older Afluria) 03/02/2017, 11/21/2017    Influenza, Janas Rota, Recombinant, IM PF (Flublok 18 yrs and older) 11/02/2020    MMR 02/27/2014    Meningococcal MCV4P (Menactra) 08/07/2009    Polio IPV (IPOL) 08/07/2009    Tdap (Boostrix, Adacel) 08/07/2009, 03/02/2017    Typhoid Vi capsular polysaccharide (Typhim VI) 10/28/2013    Vaccinia - Smallpox ([id number]) 11/06/2013    Varicella (Varivax) 03/03/2017       FALLS ASSESSMENT    [x] LOW RISK FOR FALLS    [] MODERATE RISK FOR FALLS    [] Difficulty walking/selfcare    [] Falls in the past 2 months    [] Suspicion of Clinician    [] Other:      SMOKING CESSATION     [x] Not needed [] Instructed to stop smoking    [] Pamphlet community resources given     VTE SCREEN    [] Family hx DVT/PE  /   [] Personal hx of DVT/PE    [x] Denies any family or personal hx of DVT/PE    Physician Review    [x] Past medical, family, & social history reviewed and discussed with patient. Review of surgery and post-surgical changes (by surgeon for surgical patients only)    [x] Lifelong diet expectations reviewed with patient    [x] Need for lifelong vitamin supplementation reviewed with patient    PHYSICAL EXAMINATION:      /72 (Site: Right Upper Arm, Position: Sitting, Cuff Size: Large Adult)   Pulse 91   Resp 20   Ht 5' 6\" (1.676 m)   Wt 256 lb (116.1 kg)   BMI 41.32 kg/m²     Constitutional:  Vital signs are normal. The patient appears well-developed   HEENT:      Head: Normocephalic. Atraumatic     Eyes: pupils are equal and reactive. No scleral icterus is present. Neck: No mass and no thyromegaly present. Cardiovascular: Normal rate, regular rhythm, S1 normal and S2 normal.  Bilateral pulses present. Pulmonary/Chest: Effort normal and breath sounds normal. No retractions. Abdominal: Soft. Normal appearance. There is no organomegaly. No tenderness. There is no rigidity, no rebound, no guarding and no Contreras's sign. Musculoskeletal:      Right lower leg: Normal. No tenderness and no edema. Left lower leg: Normal. No tenderness and no edema. Lymphadenopathy:     No cervical adenopathy, No Exrtemity Adenopathy. Neurological: The patient is alert and oriented. Moving all four extremities equally, sensation grossly intact bilateral.  Skin: Skin is warm, dry and intact. Psychiatric: The patient has a normal mood and affect.  Speech is normal and behavior is normal. Judgment and thought content normal. Cognition and memory are normal.     RECOMMENDATIONS:     We spent a great deal of time discussing the risks and benefits of Sleeve Gastrectomy, including but not limited to injury to intra-abdominal organs, breakdown of the gastric staple line, the need for re-operative therapy,  prolonged hospitalization,  mechanical ventilation,  and death. We discussed the possibility of bleeding, the need for blood transfusions, blood clots, hospital-acquired and intra-abdominal infection, anastomotic stricture, and worsening GERD. And we discussed the need for post-operative visit compliance, behavior modifications and diet changes, protein and vitamin supplementation, as well as routine scheduled and dedicated exercise. I instructed the patient to utilize the exercise log that will be given to them at their fist dietician appointment. We discussed the potential weight loss benefit of approximately 50-60% of her excess body weight at 12-18 months post-op, as well as the possibility of insufficient weight loss or weight gain after 2 years post-operative time. Discussed the risk of substance abuse and or nicotine abuse today with patient. They expressed understanding of the risks of abuse of such drugs. PLAN:       Diagnosis Orders   1. Gastroesophageal reflux disease without esophagitis     2. Morbid obesity with BMI of 40.0-44.9, adult [facility])            Initial Testing     Primary Procedure: Sleeve Gastrectomy     Other Procedures:None    Labwork: Initial Pre-surgical Lab Tests (CMP, TSH, Fasting Lipid Profile, Mg, Zinc, Vit B1 (whole blood), Vit B12, 25-OH Vit D, Fe,  Ferritin,  Folate) and Negative serum nicotine prior to submission for pre-auth    Imaging: None    Endoscopic Studies: Upper GI Endoscopy for abdominal pain and GERD which has been untreated. Psychological Assessment: Psychological Evaluation and Clearance    Nutrition Assessment: Bariatric Nutrition Assessment and Clearance    Other  Consultations: medical clearance for BMI 41    Physician Supervised Diet and Exercise required by the patients insurance company: 3 months.       Surgical Diet requirement:  2 weeks      Final Testing  Screening Chest Xray  and EKG within 6 months of date of surgery    Labwork:  Final Lab Tests  within 3 months of date of surgery (CBC, PT/PTT, BMP)     Electronically signed by Christoph Maldonado DO on 6/15/2022 at 7:57 PM

## 2022-06-15 NOTE — PROGRESS NOTES
Medical Nutrition Therapy  Initial Nutrition Assessment for Metabolic/ Bariatric Surgery  Required insurance visit prior to surgery:  3  Shared with patient the importance of documenting exercise and staying at or below start weight during visits. Pt reports:     José Miguel Torres is a 28 y.o. female with a date of birth of 1990. Weight History: Wt Readings from Last 3 Encounters:   06/09/22 256 lb (116.1 kg)   04/27/22 260 lb (117.9 kg)   05/18/21 226 lb (102.5 kg)        How does your weight affect your daily activities?joint pain, back pain, fatigue and short of breath with activity      What would be different in your life if you felt healthier and fit? Why is that important to you now? Sole support for child  Do you drink alcohol? . NO    Do you use tobacco in the form of cigarettes, cigars, chew or any vapor appliance? No    All female patients have been educated on the importance of using reliable birth control and avoiding pregnancy the first 2 years following bariatric surgery. All female patients will have a pregnancy test the morning of surgery to confirm. All patients are nicotine tested and require a negative nicotine level prior to surgery. Patient has been educated about the risks associated with substance use, as well as the risks of using nicotine and alcohol after surgery. Patient has been educated that these substances need to be avoided lifelong after surgery to reduce the risk of complications and sub-optimal weight loss. Weight History      Pascale Monroe's highest adult weight was 265 lbs at age . Patient was at her highest weight for  . Patient's triggers/known causes to her highest weight are . Pascale Monroe's lowest adult weight was 150 lbs at age . Patient was at her lowest weight for  . The lowest weight was achieved through  . Physical Activity  Do you participate in a structured exercise program, step counting or regular physical activity? yes      Instructions and exercise logs were provided to patient today see goal sheet and plan. Previous weight loss attempts  Patient has participated in the following weight loss programs:   HMR, Foot [employer]octavio, MARK, calixto payne, other, , [place] Weight Loss, diet workshop, lowfat diet, bloodtype, grapefruit, cabbage soup, the Zone, [place], RD counseling, hypnosis, sugarbusters, metabolife or herbalife, self directed calorie restriction, lowcarb and physician supervised      Nutrition History  Have you ever been diagnosed with an eating disorder? No  Have you ever had problems tolerating a multivitamin or mineral supplement?no  Have you ever been diagnosed with a vitamin or mineral deficiency? no     Patient dines out to a sit down restaurant 0 times per week. Patient dines out to a fast food restaurant 2 times per week. Patient does have grazing. Patient does have night eating. Patient does have a history of emotional eating. Patient does have a history of  eating out of boredom. Drinks throughout the day: coffee, iced tea, water    24 hour recall/food frequency: has been scanned into chart unless completed below. Surgery  Patient's greatest concern about having surgery is: none. How will you know when you've been successful? Assessment:  Nutritional Needs:  Men: 1500kcal daily minimum     Women 1200kcal daily minimum. 60-80gm of protein daily  PES Statement:  Obesity related to a complex combination of decreased energy needs, disordered eating patterns, physical inactivity, and increased psychological/life stress as evidenced by BMI greater than 30 and inability to maintain a significant amount of weight loss through conventional weight loss interventions. Goals    All goals were planned with and agreed on by the patient. I want to improve my health because I want to live life. appt # NA G What is your next step?   C 1 2 3 4 5 6 7 8 9     0  1 I will read the education binder provided to me and the                   2 I will make my pschological evaluation appoinment. x              0  3 I will bring this goal card to every appointment. x 4 I will eliminate all tobacco/nicotine. x 5 I will limit alcoholic beverages to 8-4GK per week. 6 I will limit dining out to 3 times per week or less. x 7 I will eliminate sugary beverages. x 8 I will eliminate carbonated beverages. 9 I will eliminate drinking with a straw. 10 I will limit caffeinated beverages to 16oz daily. 0  11 I will limit log my exercise daily. 12 I will determine my calcium and mvi plan. 13 I will have 1-2 servings of lean protein present at each meal and minimeal.                 14 I will eat every 3-5 hours. 15 I will drink 64oz of fluid daily. 16 I will eat slowly during meals and snacks. 17 I will limit fluids 4oz before after and during meals. 18 I will eat protein first at all meals followed by vegetables,  Fruit and lastly whole grains. 23 My first weight neutral approach is:                 20 My second weight neutral approach is:                 21  My Thirds weight neutral approach is:                 22                  23                  24                  25                  Goals reviewed with patient as below  Do you understand your goals? Do you have the information you need to achieve your goals? Do you have any questions  right now? Plan    Exercise for Health 15 easy exercises to do at home with 6 activity logs were provided to the patient with verbal and written instructions on how to carry this out. Goal number 14 was provided to the patient on this visit please see above.   Will follow up each month and provide support as patient begins to add physical activity to life style. Monitor and review goals adjust as needed. Follow up monthly supervised diet and exercise.        Regis Schafer RD, LD

## 2022-06-17 ENCOUNTER — OFFICE VISIT (OUTPATIENT)
Dept: FAMILY MEDICINE CLINIC | Age: 32
End: 2022-06-17
Payer: OTHER GOVERNMENT

## 2022-06-17 VITALS
TEMPERATURE: 97.1 F | WEIGHT: 262.2 LBS | OXYGEN SATURATION: 99 % | BODY MASS INDEX: 42.32 KG/M2 | DIASTOLIC BLOOD PRESSURE: 78 MMHG | SYSTOLIC BLOOD PRESSURE: 118 MMHG | HEART RATE: 88 BPM

## 2022-06-17 DIAGNOSIS — Z00.00 ROUTINE PHYSICAL EXAMINATION: Primary | ICD-10-CM

## 2022-06-17 PROBLEM — M25.569 PAIN IN JOINT, LOWER LEG: Status: RESOLVED | Noted: 2022-04-27 | Resolved: 2022-06-17

## 2022-06-17 PROBLEM — R30.0 DYSURIA: Status: RESOLVED | Noted: 2022-04-27 | Resolved: 2022-06-17

## 2022-06-17 PROBLEM — R19.7 SYMPTOM OF DIARRHEA: Status: RESOLVED | Noted: 2022-04-27 | Resolved: 2022-06-17

## 2022-06-17 PROBLEM — T14.8XXA MUSCLE STRAIN: Status: RESOLVED | Noted: 2022-04-27 | Resolved: 2022-06-17

## 2022-06-17 PROBLEM — K52.9 GASTROENTERITIS: Status: RESOLVED | Noted: 2022-04-27 | Resolved: 2022-06-17

## 2022-06-17 PROCEDURE — 99395 PREV VISIT EST AGE 18-39: CPT | Performed by: NURSE PRACTITIONER

## 2022-06-17 SDOH — ECONOMIC STABILITY: FOOD INSECURITY: WITHIN THE PAST 12 MONTHS, YOU WORRIED THAT YOUR FOOD WOULD RUN OUT BEFORE YOU GOT MONEY TO BUY MORE.: NEVER TRUE

## 2022-06-17 SDOH — ECONOMIC STABILITY: FOOD INSECURITY: WITHIN THE PAST 12 MONTHS, THE FOOD YOU BOUGHT JUST DIDN'T LAST AND YOU DIDN'T HAVE MONEY TO GET MORE.: NEVER TRUE

## 2022-06-17 ASSESSMENT — PATIENT HEALTH QUESTIONNAIRE - PHQ9
1. LITTLE INTEREST OR PLEASURE IN DOING THINGS: 0
SUM OF ALL RESPONSES TO PHQ QUESTIONS 1-9: 0
SUM OF ALL RESPONSES TO PHQ9 QUESTIONS 1 & 2: 0
SUM OF ALL RESPONSES TO PHQ QUESTIONS 1-9: 0
2. FEELING DOWN, DEPRESSED OR HOPELESS: 0

## 2022-06-17 ASSESSMENT — SOCIAL DETERMINANTS OF HEALTH (SDOH): HOW HARD IS IT FOR YOU TO PAY FOR THE VERY BASICS LIKE FOOD, HOUSING, MEDICAL CARE, AND HEATING?: NOT HARD AT ALL

## 2022-06-17 ASSESSMENT — ENCOUNTER SYMPTOMS
SHORTNESS OF BREATH: 0
DIARRHEA: 0
CHEST TIGHTNESS: 0
COUGH: 0
NAUSEA: 0
WHEEZING: 0
ANAL BLEEDING: 0
ALLERGIC/IMMUNOLOGIC NEGATIVE: 1
VOMITING: 0
BLOOD IN STOOL: 0
GASTROINTESTINAL NEGATIVE: 1
COLOR CHANGE: 0
EYES NEGATIVE: 1
RESPIRATORY NEGATIVE: 1

## 2022-06-17 NOTE — PROGRESS NOTES
[facility]  [address] [facility]  Dept: [phone]    Fatou Soto is a 28 y.o. female who presents today for her medical conditions/complaintsas noted below.       Fatou Soto is here today c/o Annual Exam    Past Medical History:   Diagnosis Date    Abnormal Pap smear of cervix     Chronic constipation     Heartburn     History of anal fissures     History of hemorrhoids     HPV in female     HSV infection     Obesity       Past Surgical History:   Procedure Laterality Date    COLONOSCOPY  10/2020    COLPOSCOPY      HEMORRHOID SURGERY      INSERTION OF CONTRACEPTIVE CAPSULE  2011    Nexplanon     INTRAUTERINE DEVICE INSERTION  2017    Mirena     REMOVAL OF CONTRACEPTIVE CAPSULE  2011    TONSILLECTOMY      2693    UMBILICAL HERNIA REPAIR      WISDOM TOOTH EXTRACTION      2010       Family History   Problem Relation Age of Onset    Obesity Mother     Diabetes Mother         borderline     Cervical Cancer Mother 37    Alcohol Abuse Father     Other Father         myeloproliferative disorder    Substance Abuse Sister     Thyroid Disease Brother     Other Brother         hearing loss     No Known Problems Maternal Grandmother     Obesity Maternal Grandfather     Kidney Disease Maternal Grandfather     Breast Cancer Paternal Grandmother        Social History     Tobacco Use    Smoking status: Never Smoker    Smokeless tobacco: Never Used   Substance Use Topics    Alcohol use: Yes     Comment: rare      Current Outpatient Medications   Medication Sig Dispense Refill    valACYclovir (VALTREX) 500 MG tablet TAKE ONE TABLET BY MOUTH DAILY 30 tablet 2    polyethylene glycol (GLYCOLAX) 17 GM/SCOOP powder Take 17 g by mouth daily as needed       Current Facility-Administered Medications   Medication Dose Route Frequency Provider Last Rate Last Admin    levonorgestrel (MIRENA) IUD 52 mg 1 each  1 each IntraUTERine Once Oscar Parikh APRN - CNP Allergies   Allergen Reactions    Percocet [Oxycodone-Acetaminophen] Nausea And Vomiting     Other reaction(s): severe Nausea And Vomiting; syncope    Vancomycin Hives         HPI:     HPI    Presents today c/o annual exam    Specialists -     [employer] - plans for gastric sleeve in October     Works as      Has 11year old son     PMH - HSV, HPV, hemorrhoids, GERD, constipation, abnormal PAP, obesity    PSH - colonoscopy, colposcopy, IUD, tonsillectomy, hernia repair, wisdom teeth removal  PFH -  DM in mother, myeloproliferative disorder in father, thyroid disease in brother   Non smoker, social alcohol, no illicit drug use   Diet has tried Keto, , beach body, counting calories   Exercise includes walking   Sleeps well at night time  PHQ 9 - 0    Health Maintenance:      Subjective:     Review of Systems   Constitutional: Negative. Negative for appetite change, chills, diaphoresis, fatigue, fever and unexpected weight change. HENT: Negative. Eyes: Negative. Respiratory: Negative. Negative for cough, chest tightness, shortness of breath and wheezing. Cardiovascular: Negative. Negative for chest pain, palpitations and leg swelling. Gastrointestinal: Negative. Negative for anal bleeding, blood in stool, diarrhea, nausea and vomiting. Endocrine: Negative. Genitourinary: Negative. Negative for difficulty urinating, dysuria, frequency and menstrual problem (amenorrhea secondary to IUD). Musculoskeletal: Negative. Skin: Negative. Negative for color change, pallor, rash and wound. Allergic/Immunologic: Negative. Negative for environmental allergies. Neurological: Negative. Negative for dizziness, seizures, light-headedness and headaches. Hematological: Negative. Does not bruise/bleed easily. Psychiatric/Behavioral: Negative. Negative for dysphoric mood and sleep disturbance. The patient is not nervous/anxious. Objective:     Vitals:    06/17/22 0906   BP: 118/78   Pulse: 88   Temp: 97.1 °F (36.2 °C)   SpO2: 99%       Body mass index is 42.32 kg/m². Physical Exam  Constitutional:       General: She is not in acute distress. Appearance: Normal appearance. She is well-developed. She is obese. She is not ill-appearing, toxic-appearing or diaphoretic. HENT:      Head: Normocephalic and atraumatic. Right Ear: Tympanic membrane, ear canal and external ear normal.      Left Ear: External ear normal. There is impacted cerumen. Nose: Nose normal.      Mouth/Throat:      Mouth: Mucous membranes are moist.      Pharynx: Oropharynx is clear. Eyes:      General: No scleral icterus. Right eye: No discharge. Left eye: No discharge. Conjunctiva/sclera: Conjunctivae normal.      Pupils: Pupils are equal, round, and reactive to light. Neck:      Trachea: No tracheal deviation. Cardiovascular:      Rate and Rhythm: Normal rate and regular rhythm. Heart sounds: Normal heart sounds. No murmur heard. No friction rub. No gallop. Pulmonary:      Effort: Pulmonary effort is normal. No tachypnea, accessory muscle usage or respiratory distress. Breath sounds: Normal breath sounds. No stridor. No decreased breath sounds, wheezing, rhonchi or rales. Abdominal:      General: Bowel sounds are normal. There is no distension. Palpations: Abdomen is soft. Tenderness: There is no abdominal tenderness. There is no guarding. Musculoskeletal:         General: No tenderness or deformity. Normal range of motion. Cervical back: Normal range of motion and neck supple. Right lower leg: No edema. Left lower leg: No edema. Lymphadenopathy:      Cervical: No cervical adenopathy. Skin:     General: Skin is warm and dry. Coloration: Skin is not pale. Findings: No erythema or rash.    Neurological:      Mental Status: She is alert and oriented to person, place, and time. GCS: GCS eye subscore is 4. GCS verbal subscore is 5. GCS motor subscore is 6. Gait: Gait is intact. Gait normal.   Psychiatric:         Speech: Speech normal.         Behavior: Behavior normal.         Thought Content: Thought content normal.         Judgment: Judgment normal.           Assessment:         1. Routine physical examination        Plan:     1. Routine physical examination    Patient declines labs at this time  Recommended continued diet, exercise & weight loss   Immunizations UTD     Discussed use, benefit, and side effects of prescribed medications. All patient questions answered. Pt voiced understanding. Reviewed health maintenance. Instructed to continue current medications, diet and exercise. Patient agreedwith treatment plan. Follow up as directed.      Electronically signed by GRACE Cary CNP on 6/17/2022

## 2022-07-18 ENCOUNTER — OFFICE VISIT (OUTPATIENT)
Dept: BARIATRICS/WEIGHT MGMT | Age: 32
End: 2022-07-18
Payer: OTHER GOVERNMENT

## 2022-07-18 VITALS
HEIGHT: 66 IN | WEIGHT: 260 LBS | DIASTOLIC BLOOD PRESSURE: 75 MMHG | BODY MASS INDEX: 41.78 KG/M2 | SYSTOLIC BLOOD PRESSURE: 113 MMHG | HEART RATE: 96 BPM

## 2022-07-18 DIAGNOSIS — K58.8 OTHER IRRITABLE BOWEL SYNDROME: ICD-10-CM

## 2022-07-18 DIAGNOSIS — K21.9 GASTROESOPHAGEAL REFLUX DISEASE WITHOUT ESOPHAGITIS: ICD-10-CM

## 2022-07-18 DIAGNOSIS — Z86.2 HISTORY OF ANEMIA: ICD-10-CM

## 2022-07-18 DIAGNOSIS — R73.03 PREDIABETES: Primary | ICD-10-CM

## 2022-07-18 DIAGNOSIS — E66.01 MORBID OBESITY WITH BMI OF 40.0-44.9, ADULT (HCC): ICD-10-CM

## 2022-07-18 DIAGNOSIS — K59.09 CHRONIC CONSTIPATION: ICD-10-CM

## 2022-07-18 PROBLEM — N92.0 MENORRHAGIA: Status: RESOLVED | Noted: 2022-04-27 | Resolved: 2022-07-18

## 2022-07-18 PROBLEM — N94.6 DYSMENORRHEA: Status: RESOLVED | Noted: 2022-04-27 | Resolved: 2022-07-18

## 2022-07-18 PROBLEM — N87.9 CERVICAL DYSPLASIA: Status: RESOLVED | Noted: 2022-04-27 | Resolved: 2022-07-18

## 2022-07-18 PROBLEM — L60.0 INGROWING TOENAIL: Status: RESOLVED | Noted: 2022-04-27 | Resolved: 2022-07-18

## 2022-07-18 PROBLEM — J35.01 CHRONIC TONSILLITIS: Status: RESOLVED | Noted: 2022-04-27 | Resolved: 2022-07-18

## 2022-07-18 PROBLEM — R87.612 LOW GRADE SQUAMOUS INTRAEPITHELIAL LESION (LGSIL) ON PAPANICOLAOU SMEAR OF CERVIX: Status: RESOLVED | Noted: 2022-04-27 | Resolved: 2022-07-18

## 2022-07-18 PROBLEM — Z97.5 PRESENCE OF SUBDERMAL CONTRACEPTIVE DEVICE: Status: RESOLVED | Noted: 2022-04-27 | Resolved: 2022-07-18

## 2022-07-18 PROCEDURE — 99213 OFFICE O/P EST LOW 20 MIN: CPT | Performed by: NURSE PRACTITIONER

## 2022-07-18 NOTE — PROGRESS NOTES
Medical Nutrition Therapy   Metabolic and Bariatric Surgery         Supervised diet and exercise preparation  Visit 1 out of 3  Pt reports:      Changes in eating patterns to promote health are noted below on the goals number 19-22    Vitals: Wt Readings from Last 3 Encounters:   07/18/22 260 lb (117.9 kg)   06/17/22 262 lb 3.2 oz (118.9 kg)   06/09/22 256 lb (116.1 kg)         Nutrition Assessment:   PES: Knowledge deficit related to healthy behaviors that support weight management post weight loss surgery as evidenced by Body mass index is 41.97 kg/m². Nutrition Assessment of Goal Attainment:  TREATMENT GOALS:    1. Pt  Completed 3 out of 3 goals. 2.TREATMENT GOALS FOR UPCOMING WEEK: continue all previous goals and add: # 10,19,20    All goals were planned with and agreed on by the patient. I want to improve my health because I want to live life. appt # NA G What is your next step? C 1 2 3 4 5 6 7 8 9     0  1 I will read the education binder provided to me and the   x 100               2 I will make my pschological evaluation appoinment. x              1  3 I will bring this goal card to every appointment. 100              x 4 I will eliminate all tobacco/nicotine. x 5 I will limit alcoholic beverages to 8-3VD per week. 6 I will limit dining out to 3 times per week or less. x 7 I will eliminate sugary beverages. x 8 I will eliminate carbonated beverages. 9 I will eliminate drinking with a straw. 10 I will limit caffeinated beverages to 16oz daily. 1  11 I will limit log my exercise daily. 100             1  12 I will determine my calcium and mvi plan. 13 I will have 1-2 servings of lean protein present at each meal and minimeal.                 14 I will eat every 3-5 hours. 15 I will drink 64oz of fluid daily.  x                16 I will eat slowly during meals and snacks. 17 I will limit fluids 4oz before after and during meals. 18 I will eat protein first at all meals followed by vegetables,  Fruit and lastly whole grains. 1  19 My first weight neutral approach is:  I will meal prep and bring lunch. 1  20 My second weight neutral approach is:  I will continue healthier snacks. 21  My Thirds weight neutral approach is:                 22                  23                  24                  25                    Questions asked for goal understanding:                                                  Do you understand your goals? Do you have the information you need to achieve your goals? Do you have any questions  right now? []  Consistent goal achievement in the program thus far and further success with goals is expected. []  Unable to consistently make progress in goal achievement. At this time patient is not moving forward  in developing the skills needed for success after surgery. Plan:    Continue to follow monthly and review goals.          [x]  Nutrition visits complete    []

## 2022-07-18 NOTE — PROGRESS NOTES
Medical Weight Management Progress Note    Subjective     Patient being seen for medically supervised weight loss for the chronic conditions of Prediabetes, IBS, Chronic Constipation, GERD, Hx Anemia. She is working on the behavior changes discussed at the initial appointment. Patient continues on diet plan. Physical activity includes walking. Weight 260 lbs. Psych eval completed and clearance received. Needs to schedule EGD. No current issues. Working toward bariatric surgery:    [x] Sleeve Gastrectomy                                                           [] Sydni-en-Y Gastric Bypass    Allergies: Allergies   Allergen Reactions    Percocet [Oxycodone-Acetaminophen] Nausea And Vomiting     Other reaction(s): severe Nausea And Vomiting; syncope    Vancomycin Hives       Past Medical History:     Past Medical History:   Diagnosis Date    Abnormal Pap smear of cervix     Chronic constipation     Heartburn     History of anal fissures     History of hemorrhoids     HPV in female     HSV infection     Obesity    .     Past Surgical History:  Past Surgical History:   Procedure Laterality Date    COLONOSCOPY  10/2020    COLPOSCOPY      HEMORRHOID SURGERY      INSERTION OF CONTRACEPTIVE CAPSULE  2011    Nexplanon     INTRAUTERINE DEVICE INSERTION  2017    Mirena     REMOVAL OF CONTRACEPTIVE CAPSULE  2011    TONSILLECTOMY      0554    UMBILICAL HERNIA REPAIR      WISDOM TOOTH EXTRACTION      2010       Family History:  Family History   Problem Relation Age of Onset    Obesity Mother     Diabetes Mother         borderline     Cervical Cancer Mother 37    Alcohol Abuse Father     Other Father         myeloproliferative disorder    Substance Abuse Sister     Thyroid Disease Brother     Other Brother         hearing loss     No Known Problems Maternal Grandmother     Obesity Maternal Grandfather     Kidney Disease Maternal Grandfather     Breast Cancer Paternal Grandmother        Social History:  Social History Socioeconomic History    Marital status: Single     Spouse name: Not on file    Number of children: Not on file    Years of education: Not on file    Highest education level: Not on file   Occupational History    Not on file   Tobacco Use    Smoking status: Never    Smokeless tobacco: Never   Vaping Use    Vaping Use: Never used   Substance and Sexual Activity    Alcohol use: Yes     Comment: rare    Drug use: No    Sexual activity: Not Currently     Partners: Male     Birth control/protection: I.U.D. Other Topics Concern    Not on file   Social History Narrative    Not on file     Social Determinants of Health     Financial Resource Strain: Low Risk     Difficulty of Paying Living Expenses: Not hard at all   Food Insecurity: No Food Insecurity    Worried About Running Out of Food in the Last Year: Never true    Ran Out of Food in the Last Year: Never true   Transportation Needs: Not on file   Physical Activity: Not on file   Stress: Not on file   Social Connections: Not on file   Intimate Partner Violence: Not on file   Housing Stability: Not on file       Current Medications:  Current Outpatient Medications   Medication Sig Dispense Refill    valACYclovir (VALTREX) 500 MG tablet TAKE ONE TABLET BY MOUTH DAILY 30 tablet 2    polyethylene glycol (GLYCOLAX) 17 GM/SCOOP powder Take 17 g by mouth daily as needed       Current Facility-Administered Medications   Medication Dose Route Frequency Provider Last Rate Last Admin    levonorgestrel (MIRENA) IUD 52 mg 1 each  1 each IntraUTERine Once Ev Ferguson, APRN - CNP           Vital Signs:  /75 (Site: Right Upper Arm, Position: Sitting, Cuff Size: Large Adult)   Pulse 96   Ht 5' 6\" (1.676 m)   Wt 260 lb (117.9 kg)   BMI 41.97 kg/m²     BMI/Height/Weight:  Body mass index is 41.97 kg/m². Review of Systems - A review of systems was performed. All was negative unless otherwise documented in HPI. Constitutional: Negative for fever, chills.    HENT: Negative for trouble swallowing. Eyes: Negative for visual disturbance. Respiratory: Negative for cough, shortness of breath. Cardiovascular: Negative for chest pain and palpitations. Gastrointestinal: Negative for nausea, vomiting, abdominal pain, diarrhea, blood in stool and abdominal distention. Positive for constipation. Endocrine: Negative for polydipsia, polyphagia and polyuria. Genitourinary: Negative for dysuria, frequency, hematuria and difficulty urinating. Musculoskeletal: Negative for myalgias, joint swelling. Skin: Negative for pallor and rash. Neurological: Negative for dizziness, tremors, light-headedness and headaches. Psychiatric/Behavioral: Negative for sleep disturbance and dysphoric mood. Objective:      Physical Exam   Vital signs reviewed. General: Well-developed and well-nourished. No acute distress. Skin: Warm, dry and intact. HEENT: Normocephalic. EOMs intact. Conjunctivae normal. Neck supple. Cardiovascular: Normal rate, regular rhythm. Pulmonary/Chest: Normal effort. Lungs clear to auscultation. No rales, rhonchi or wheezing. Abdominal: Positive bowel sounds. Soft, nontender. Nondistended. Musculoskeletal: Movement x4. No edema. Neurological: Gait normal. Alert and oriented to person, place, and time. Psychiatric: Normal mood and affect. Speech and behavior normal. Judgment and thought content normal. Cognition and memory intact. Assessment:       Diagnosis Orders   1. Prediabetes  CBC with Auto Differential    Comprehensive Metabolic Panel    Ferritin    Hemoglobin A1C    Iron and TIBC    Lipid Panel    Magnesium    PTH, Intact    TSH    Vitamin A    Vitamin B1    Vitamin B12 & Folate    Vitamin D 25 Hydroxy    Zinc      2.  Other irritable bowel syndrome  CBC with Auto Differential    Comprehensive Metabolic Panel    Ferritin    Hemoglobin A1C    Iron and TIBC    Lipid Panel    Magnesium    PTH, Intact    TSH    Vitamin A    Vitamin B1    Vitamin B12 & Folate    Vitamin D 25 Hydroxy    Zinc      3. Gastroesophageal reflux disease without esophagitis  CBC with Auto Differential    Comprehensive Metabolic Panel    Ferritin    Hemoglobin A1C    Iron and TIBC    Lipid Panel    Magnesium    PTH, Intact    TSH    Vitamin A    Vitamin B1    Vitamin B12 & Folate    Vitamin D 25 Hydroxy    Zinc      4. History of anemia  CBC with Auto Differential    Comprehensive Metabolic Panel    Ferritin    Hemoglobin A1C    Iron and TIBC    Lipid Panel    Magnesium    PTH, Intact    TSH    Vitamin A    Vitamin B1    Vitamin B12 & Folate    Vitamin D 25 Hydroxy    Zinc      5. Morbid obesity with BMI of 40.0-44.9, adult (HCC)  CBC with Auto Differential    Comprehensive Metabolic Panel    Ferritin    Hemoglobin A1C    Iron and TIBC    Lipid Panel    Magnesium    PTH, Intact    TSH    Vitamin A    Vitamin B1    Vitamin B12 & Folate    Vitamin D 25 Hydroxy    Zinc      6. Chronic constipation            Plan:    Dietitian visit today. Patient was encouraged to journal all food intake. Keep calorie level at approximately 0116-6972. Protein intake is to be a minimum of 60-80 grams per day. Water drinking was encouraged with a goal of 64oz-128oz daily. Beverages to be calorie free except for milk. Every other beverage should be water. Avoid soda. Continue to increase level of physical activity. Encouraged use of exercise log. Patients will undergo thorough nutritional evaluation and counseling with our registered dietician. Our program provides long term nutritional counseling with unlimited consults with the dietician. All female patients have been educated on the importance of using reliable birth control and avoiding pregnancy the first 18 months - 2 years following bariatric surgery. All female patient will have a pregnancy test done with the pre-admission testing. All patients are nicotine tested and require a negative nicotine level prior to surgery.   Patient has been educated about the risks associated with substance use, as well as the risks of using nicotine and alcohol after surgery. Patient has been educated that theses substances need to be avoided lifelong after surgery to reduce the risk of complications and sub-optimal weight loss. Bariatric pre-op labs ordered. Follow-up  Return in about 1 month (around 8/18/2022). Orders this encounter:  Orders Placed This Encounter   Procedures    CBC with Auto Differential     Standing Status:   Future     Standing Expiration Date:   7/18/2023    Comprehensive Metabolic Panel     Standing Status:   Future     Standing Expiration Date:   7/18/2023    Ferritin     Standing Status:   Future     Standing Expiration Date:   7/18/2023    Hemoglobin A1C     Standing Status:   Future     Standing Expiration Date:   7/18/2023    Iron and TIBC     Standing Status:   Future     Standing Expiration Date:   7/18/2023     Order Specific Question:   Is Patient Fasting? Answer:   no     Order Specific Question:   No of Hours?      Answer:   0    Lipid Panel     Standing Status:   Future     Standing Expiration Date:   7/18/2023     Order Specific Question:   Is Patient Fasting?/# of Hours     Answer:   12    Magnesium     Standing Status:   Future     Standing Expiration Date:   7/18/2023    PTH, Intact     Standing Status:   Future     Standing Expiration Date:   7/18/2023    TSH     Standing Status:   Future     Standing Expiration Date:   7/18/2023    Vitamin A     Standing Status:   Future     Standing Expiration Date:   7/18/2023    Vitamin B1     Standing Status:   Future     Standing Expiration Date:   7/18/2023    Vitamin B12 & Folate     Standing Status:   Future     Standing Expiration Date:   7/18/2023    Vitamin D 25 Hydroxy     Standing Status:   Future     Standing Expiration Date:   7/18/2023    Zinc     Standing Status:   Future     Standing Expiration Date:   7/18/2023       Prescriptions this encounter:  No orders of the defined types were placed in this encounter.       Electronically signed by:  Gaudencio Milton CNP

## 2022-07-20 ENCOUNTER — NURSE ONLY (OUTPATIENT)
Dept: BARIATRICS/WEIGHT MGMT | Age: 32
End: 2022-07-20

## 2022-08-04 ENCOUNTER — NURSE ONLY (OUTPATIENT)
Dept: BARIATRICS/WEIGHT MGMT | Age: 32
End: 2022-08-04

## 2022-08-04 LAB
ALBUMIN SERPL-MCNC: 4.2 G/DL
ALP BLD-CCNC: 72 U/L
ALT SERPL-CCNC: 29 U/L
ANION GAP SERPL CALCULATED.3IONS-SCNC: NORMAL MMOL/L
AST SERPL-CCNC: 16 U/L
AVERAGE GLUCOSE: NORMAL
BASOPHILS ABSOLUTE: 53 /ΜL
BASOPHILS RELATIVE PERCENT: 0.8 %
BILIRUB SERPL-MCNC: 0.5 MG/DL (ref 0.1–1.4)
BUN BLDV-MCNC: 12 MG/DL
CALCIUM SERPL-MCNC: 8.8 MG/DL
CHLORIDE BLD-SCNC: 108 MMOL/L
CHOLESTEROL, TOTAL: 128 MG/DL
CHOLESTEROL/HDL RATIO: 3.6
CO2: 23 MMOL/L
CREAT SERPL-MCNC: 0.85 MG/DL
EOSINOPHILS ABSOLUTE: 158 /ΜL
EOSINOPHILS RELATIVE PERCENT: 2.4 %
FERRITIN: 119 NG/ML (ref 9–150)
FOLATE: NORMAL
GFR CALCULATED: 94
GLUCOSE BLD-MCNC: 126 MG/DL
HBA1C MFR BLD: 5.7 %
HCT VFR BLD CALC: 42.7 % (ref 36–46)
HDLC SERPL-MCNC: 36 MG/DL (ref 35–70)
HEMOGLOBIN: 14 G/DL (ref 12–16)
IRON: 97
LDL CHOLESTEROL CALCULATED: 75 MG/DL (ref 0–160)
LYMPHOCYTES ABSOLUTE: 1597 /ΜL
LYMPHOCYTES RELATIVE PERCENT: 24.2 %
MAGNESIUM: 5.6 MG/DL
MCH RBC QN AUTO: 28.7 PG
MCHC RBC AUTO-ENTMCNC: 32.8 G/DL
MCV RBC AUTO: 87.7 FL
MONOCYTES ABSOLUTE: 508 /ΜL
MONOCYTES RELATIVE PERCENT: 7.7 %
NEUTROPHILS ABSOLUTE: 4283 /ΜL
NEUTROPHILS RELATIVE PERCENT: 64.9 %
NONHDLC SERPL-MCNC: 92 MG/DL
PDW BLD-RTO: 13 %
PLATELET # BLD: 274 K/ΜL
PMV BLD AUTO: 9.7 FL
POTASSIUM SERPL-SCNC: 4.2 MMOL/L
PTH INTACT: 59
RBC # BLD: 4.87 10^6/ΜL
SODIUM BLD-SCNC: 137 MMOL/L
TOTAL IRON BINDING CAPACITY: 334
TOTAL PROTEIN: 6.7
TRIGL SERPL-MCNC: 86 MG/DL
TSH SERPL DL<=0.05 MIU/L-ACNC: 2.55 UIU/ML
VITAMIN B-12: 339
VITAMIN D 25-HYDROXY: 30
VITAMIN D2, 25 HYDROXY: NORMAL
VITAMIN D3,25 HYDROXY: NORMAL
VLDLC SERPL CALC-MCNC: NORMAL MG/DL
WBC # BLD: 6.6 10^3/ML

## 2022-08-12 ENCOUNTER — TELEPHONE (OUTPATIENT)
Dept: BARIATRICS/WEIGHT MGMT | Age: 32
End: 2022-08-12

## 2022-08-12 NOTE — TELEPHONE ENCOUNTER
Left message for patient , has egd scheduled at [place] on 8-19-22 at 7:30 am, needs to be there by 6:30 am and left mychart message

## 2022-08-17 ENCOUNTER — OFFICE VISIT (OUTPATIENT)
Dept: BARIATRICS/WEIGHT MGMT | Age: 32
End: 2022-08-17
Payer: OTHER GOVERNMENT

## 2022-08-17 VITALS
HEIGHT: 66 IN | HEART RATE: 97 BPM | BODY MASS INDEX: 41.78 KG/M2 | WEIGHT: 260 LBS | SYSTOLIC BLOOD PRESSURE: 137 MMHG | DIASTOLIC BLOOD PRESSURE: 83 MMHG

## 2022-08-17 DIAGNOSIS — Z86.2 HISTORY OF ANEMIA: ICD-10-CM

## 2022-08-17 DIAGNOSIS — K59.09 CHRONIC CONSTIPATION: ICD-10-CM

## 2022-08-17 DIAGNOSIS — R73.03 PREDIABETES: ICD-10-CM

## 2022-08-17 DIAGNOSIS — E66.01 MORBID OBESITY WITH BMI OF 40.0-44.9, ADULT (HCC): ICD-10-CM

## 2022-08-17 DIAGNOSIS — K21.9 GASTROESOPHAGEAL REFLUX DISEASE WITHOUT ESOPHAGITIS: Primary | ICD-10-CM

## 2022-08-17 DIAGNOSIS — K58.8 OTHER IRRITABLE BOWEL SYNDROME: ICD-10-CM

## 2022-08-17 DIAGNOSIS — D50.9 IRON DEFICIENCY ANEMIA, UNSPECIFIED IRON DEFICIENCY ANEMIA TYPE: ICD-10-CM

## 2022-08-17 PROCEDURE — 99213 OFFICE O/P EST LOW 20 MIN: CPT | Performed by: NURSE PRACTITIONER

## 2022-08-17 RX ORDER — CEPHALEXIN 500 MG/1
CAPSULE ORAL
COMMUNITY
Start: 2022-08-14 | End: 2022-10-04

## 2022-08-17 NOTE — PROGRESS NOTES
Medical Nutrition Therapy   Metabolic and Bariatric Surgery         Supervised diet and exercise preparation  Visit 2 out of 3      Changes in eating patterns to promote health are noted below on the goals number 19-22    Vitals: Wt Readings from Last 3 Encounters:   08/17/22 260 lb (117.9 kg)   07/18/22 260 lb (117.9 kg)   06/17/22 262 lb 3.2 oz (118.9 kg)         Nutrition Assessment:   PES: Knowledge deficit related to healthy behaviors that support weight management post weight loss surgery as evidenced by Body mass index is 41.97 kg/m². Nutrition Assessment of Goal Attainment:  TREATMENT GOALS:    1. Pt  Completed 4 out of 4 goals. 2.TREATMENT GOALS FOR UPCOMING WEEK: continue all previous goals and add: # 12    All goals were planned with and agreed on by the patient. I want to improve my health because I want to live life. appt # NA G What is your next step? C 1 2 3 4 5 6 7 8 9     0  1 I will read the education binder provided to me and the   x 100               2 I will make my pschological evaluation appoinment. x              2  3 I will bring this goal card to every appointment. 100 100             x 4 I will eliminate all tobacco/nicotine. x 5 I will limit alcoholic beverages to 1-7UX per week. x 6 I will limit dining out to 3 times per week or less. x 7 I will eliminate sugary beverages. x 8 I will eliminate carbonated beverages. x 9 I will eliminate drinking with a straw. 2  10 I will limit caffeinated beverages to 16oz daily. 2  11 I will limit log my exercise daily. 100 100            2  12 I will determine my calcium and mvi plan. x 13 I will have 1-2 servings of lean protein present at each meal and minimeal.                x 14 I will eat every 3-5 hours. 15 I will drink 64oz of fluid daily.  x              2  16 I will eat slowly during meals

## 2022-08-17 NOTE — PROGRESS NOTES
Medical Weight Management Progress Note    Subjective     Patient being seen for medically supervised weight loss for the chronic conditions of Prediabetes, IBS, Chronic Constipation, GERD, Hx Anemia. She is working on the behavior changes discussed at the initial appointment. Patient continues on diet plan. Physical activity includes walking. Weight loss of 0 lbs since last visit. Psych eval completed and clearance received. EGD scheduled 8/19/22. No current issues. Working toward bariatric surgery:    [x] Sleeve Gastrectomy                                                           [] Sydni-en-Y Gastric Bypass    Allergies: Allergies   Allergen Reactions    Percocet [Oxycodone-Acetaminophen] Nausea And Vomiting     Other reaction(s): severe Nausea And Vomiting; syncope    Vancomycin Hives       Past Medical History:     Past Medical History:   Diagnosis Date    Abnormal Pap smear of cervix     Chronic constipation     Heartburn     History of anal fissures     History of hemorrhoids     HPV in female     HSV infection     Obesity    .     Past Surgical History:  Past Surgical History:   Procedure Laterality Date    COLONOSCOPY  10/2020    COLPOSCOPY      HEMORRHOID SURGERY      INSERTION OF CONTRACEPTIVE CAPSULE  2011    Nexplanon     INTRAUTERINE DEVICE INSERTION  2017    Mirena     REMOVAL OF CONTRACEPTIVE CAPSULE  2011    TONSILLECTOMY      6843    UMBILICAL HERNIA REPAIR      WISDOM TOOTH EXTRACTION      2010       Family History:  Family History   Problem Relation Age of Onset    Obesity Mother     Diabetes Mother         borderline     Cervical Cancer Mother 37    Alcohol Abuse Father     Other Father         myeloproliferative disorder    Substance Abuse Sister     Thyroid Disease Brother     Other Brother         hearing loss     No Known Problems Maternal Grandmother     Obesity Maternal Grandfather     Kidney Disease Maternal Grandfather     Breast Cancer Paternal Grandmother        Social documented in HPI. Constitutional: Negative for fever, chills. HENT: Negative for trouble swallowing. Eyes: Negative for visual disturbance. Respiratory: Negative for cough, shortness of breath. Cardiovascular: Negative for chest pain and palpitations. Gastrointestinal: Negative for nausea, vomiting, abdominal pain, diarrhea, blood in stool and abdominal distention. Positive for constipation. Endocrine: Negative for polydipsia, polyphagia and polyuria. Genitourinary: Negative for dysuria, frequency, hematuria and difficulty urinating. Musculoskeletal: Negative for myalgias, joint swelling. Skin: Negative for pallor and rash. Neurological: Negative for dizziness, tremors, light-headedness and headaches. Psychiatric/Behavioral: Negative for sleep disturbance and dysphoric mood. Objective:      Physical Exam   Vital signs reviewed. General: Well-developed and well-nourished. No acute distress. Skin: Warm, dry and intact. HEENT: Normocephalic. EOMs intact. Conjunctivae normal. Neck supple. Cardiovascular: Normal rate, regular rhythm. Pulmonary/Chest: Normal effort. Lungs clear to auscultation. No rales, rhonchi or wheezing. Abdominal: Positive bowel sounds. Soft, nontender. Nondistended. Musculoskeletal: Movement x4. No edema. Neurological: Gait normal. Alert and oriented to person, place, and time. Psychiatric: Normal mood and affect. Speech and behavior normal. Judgment and thought content normal. Cognition and memory intact. Assessment:       Diagnosis Orders   1. Gastroesophageal reflux disease without esophagitis        2. Morbid obesity with BMI of 40.0-44.9, adult ([facility] [address]        3. Iron deficiency anemia, unspecified iron deficiency anemia type        4. Prediabetes        5. History of anemia        6. Other irritable bowel syndrome        7. Chronic constipation            Plan:    Dietitian visit today. Patient was encouraged to journal all food intake.  Keep calorie level at approximately [id number]. Protein intake is to be a minimum of 60-80 grams per day. Water drinking was encouraged with a goal of 64oz-128oz daily. Beverages to be calorie free except for milk. Every other beverage should be water. Avoid soda. Continue to increase level of physical activity. Encouraged use of exercise log. Patients will undergo thorough nutritional evaluation and counseling with our registered dietician. Our program provides long term nutritional counseling with unlimited consults with the dietician. All female patients have been educated on the importance of using reliable birth control and avoiding pregnancy the first 18 months - 2 years following bariatric surgery. All female patient will have a pregnancy test done with the pre-admission testing. All patients are nicotine tested and require a negative nicotine level prior to surgery. Patient has been educated about the risks associated with substance use, as well as the risks of using nicotine and alcohol after surgery. Patient has been educated that theses substances need to be avoided lifelong after surgery to reduce the risk of complications and sub-optimal weight loss. Pending results of labs done at [address] [place] [address].  MA to call for results. Follow-up  Return in about 1 month (around 9/17/2022). Orders this encounter:  No orders of the defined types were placed in this encounter. Prescriptions this encounter:  No orders of the defined types were placed in this encounter.       Electronically signed by:  Ruben Leon CNP

## 2022-08-18 ENCOUNTER — ANESTHESIA EVENT (OUTPATIENT)
Dept: OPERATING ROOM | Age: 32
End: 2022-08-18
Payer: OTHER GOVERNMENT

## 2022-08-19 ENCOUNTER — ANESTHESIA (OUTPATIENT)
Dept: OPERATING ROOM | Age: 32
End: 2022-08-19
Payer: OTHER GOVERNMENT

## 2022-08-19 ENCOUNTER — HOSPITAL ENCOUNTER (OUTPATIENT)
Age: 32
Setting detail: OUTPATIENT SURGERY
Discharge: HOME | End: 2022-08-19
Attending: SURGERY | Admitting: SURGERY
Payer: OTHER GOVERNMENT

## 2022-08-19 VITALS
RESPIRATION RATE: 26 BRPM | OXYGEN SATURATION: 99 % | DIASTOLIC BLOOD PRESSURE: 80 MMHG | WEIGHT: 262.5 LBS | BODY MASS INDEX: 42.19 KG/M2 | SYSTOLIC BLOOD PRESSURE: 114 MMHG | TEMPERATURE: 97.1 F | HEIGHT: 66 IN | HEART RATE: 76 BPM

## 2022-08-19 DIAGNOSIS — E66.9 OBESITY, UNSPECIFIED CLASSIFICATION, UNSPECIFIED OBESITY TYPE, UNSPECIFIED WHETHER SERIOUS COMORBIDITY PRESENT: ICD-10-CM

## 2022-08-19 DIAGNOSIS — K21.9 GASTROESOPHAGEAL REFLUX DISEASE, UNSPECIFIED WHETHER ESOPHAGITIS PRESENT: ICD-10-CM

## 2022-08-19 LAB — HCG, PREGNANCY URINE (POC): NEGATIVE

## 2022-08-19 PROCEDURE — 2709999900 HC NON-CHARGEABLE SUPPLY: Performed by: SURGERY

## 2022-08-19 PROCEDURE — 3609012400 HC EGD TRANSORAL BIOPSY SINGLE/MULTIPLE: Performed by: SURGERY

## 2022-08-19 PROCEDURE — 7100000011 HC PHASE II RECOVERY - ADDTL 15 MIN: Performed by: SURGERY

## 2022-08-19 PROCEDURE — 81025 URINE PREGNANCY TEST: CPT

## 2022-08-19 PROCEDURE — 6360000002 HC RX W HCPCS: Performed by: NURSE ANESTHETIST, CERTIFIED REGISTERED

## 2022-08-19 PROCEDURE — 3700000000 HC ANESTHESIA ATTENDED CARE: Performed by: SURGERY

## 2022-08-19 PROCEDURE — 43239 EGD BIOPSY SINGLE/MULTIPLE: CPT | Performed by: SURGERY

## 2022-08-19 PROCEDURE — 7100000010 HC PHASE II RECOVERY - FIRST 15 MIN: Performed by: SURGERY

## 2022-08-19 PROCEDURE — 2500000003 HC RX 250 WO HCPCS: Performed by: NURSE ANESTHETIST, CERTIFIED REGISTERED

## 2022-08-19 PROCEDURE — 3700000001 HC ADD 15 MINUTES (ANESTHESIA): Performed by: SURGERY

## 2022-08-19 PROCEDURE — 88305 TISSUE EXAM BY PATHOLOGIST: CPT

## 2022-08-19 PROCEDURE — 2580000003 HC RX 258: Performed by: ANESTHESIOLOGY

## 2022-08-19 RX ORDER — LIDOCAINE HYDROCHLORIDE 10 MG/ML
INJECTION, SOLUTION EPIDURAL; INFILTRATION; INTRACAUDAL; PERINEURAL PRN
Status: DISCONTINUED | OUTPATIENT
Start: 2022-08-19 | End: 2022-08-19 | Stop reason: SDUPTHER

## 2022-08-19 RX ORDER — DIPHENHYDRAMINE HYDROCHLORIDE 50 MG/ML
12.5 INJECTION INTRAMUSCULAR; INTRAVENOUS
Status: DISCONTINUED | OUTPATIENT
Start: 2022-08-19 | End: 2022-08-19 | Stop reason: HOSPADM

## 2022-08-19 RX ORDER — SODIUM CHLORIDE 0.9 % (FLUSH) 0.9 %
5-40 SYRINGE (ML) INJECTION PRN
Status: DISCONTINUED | OUTPATIENT
Start: 2022-08-19 | End: 2022-08-19 | Stop reason: HOSPADM

## 2022-08-19 RX ORDER — SODIUM CHLORIDE 0.9 % (FLUSH) 0.9 %
5-40 SYRINGE (ML) INJECTION EVERY 12 HOURS SCHEDULED
Status: DISCONTINUED | OUTPATIENT
Start: 2022-08-19 | End: 2022-08-19 | Stop reason: HOSPADM

## 2022-08-19 RX ORDER — LIDOCAINE HYDROCHLORIDE 10 MG/ML
INJECTION, SOLUTION EPIDURAL; INFILTRATION; INTRACAUDAL; PERINEURAL
Status: COMPLETED
Start: 2022-08-19 | End: 2022-08-19

## 2022-08-19 RX ORDER — SODIUM CHLORIDE 9 MG/ML
INJECTION, SOLUTION INTRAVENOUS PRN
Status: DISCONTINUED | OUTPATIENT
Start: 2022-08-19 | End: 2022-08-19 | Stop reason: HOSPADM

## 2022-08-19 RX ORDER — SODIUM CHLORIDE, SODIUM LACTATE, POTASSIUM CHLORIDE, CALCIUM CHLORIDE 600; 310; 30; 20 MG/100ML; MG/100ML; MG/100ML; MG/100ML
INJECTION, SOLUTION INTRAVENOUS CONTINUOUS
Status: DISCONTINUED | OUTPATIENT
Start: 2022-08-19 | End: 2022-08-19 | Stop reason: HOSPADM

## 2022-08-19 RX ORDER — MEPERIDINE HYDROCHLORIDE 50 MG/ML
12.5 INJECTION INTRAMUSCULAR; INTRAVENOUS; SUBCUTANEOUS ONCE
Status: DISCONTINUED | OUTPATIENT
Start: 2022-08-19 | End: 2022-08-19 | Stop reason: HOSPADM

## 2022-08-19 RX ORDER — PROPOFOL 10 MG/ML
INJECTION, EMULSION INTRAVENOUS PRN
Status: DISCONTINUED | OUTPATIENT
Start: 2022-08-19 | End: 2022-08-19 | Stop reason: SDUPTHER

## 2022-08-19 RX ORDER — ONDANSETRON 2 MG/ML
4 INJECTION INTRAMUSCULAR; INTRAVENOUS
Status: DISCONTINUED | OUTPATIENT
Start: 2022-08-19 | End: 2022-08-19 | Stop reason: HOSPADM

## 2022-08-19 RX ORDER — SODIUM CHLORIDE 9 MG/ML
25 INJECTION, SOLUTION INTRAVENOUS PRN
Status: DISCONTINUED | OUTPATIENT
Start: 2022-08-19 | End: 2022-08-19 | Stop reason: HOSPADM

## 2022-08-19 RX ORDER — LIDOCAINE HYDROCHLORIDE 10 MG/ML
1 INJECTION, SOLUTION EPIDURAL; INFILTRATION; INTRACAUDAL; PERINEURAL
Status: DISCONTINUED | OUTPATIENT
Start: 2022-08-19 | End: 2022-08-19 | Stop reason: HOSPADM

## 2022-08-19 RX ORDER — MORPHINE SULFATE 2 MG/ML
1 INJECTION, SOLUTION INTRAMUSCULAR; INTRAVENOUS EVERY 5 MIN PRN
Status: DISCONTINUED | OUTPATIENT
Start: 2022-08-19 | End: 2022-08-19 | Stop reason: HOSPADM

## 2022-08-19 RX ADMIN — LIDOCAINE HYDROCHLORIDE 80 MG: 10 INJECTION, SOLUTION EPIDURAL; INFILTRATION; INTRACAUDAL; PERINEURAL at 07:39

## 2022-08-19 RX ADMIN — PROPOFOL 50 MG: 10 INJECTION, EMULSION INTRAVENOUS at 07:40

## 2022-08-19 RX ADMIN — PROPOFOL 30 MG: 10 INJECTION, EMULSION INTRAVENOUS at 07:42

## 2022-08-19 RX ADMIN — PROPOFOL 100 MG: 10 INJECTION, EMULSION INTRAVENOUS at 07:39

## 2022-08-19 RX ADMIN — SODIUM CHLORIDE, POTASSIUM CHLORIDE, SODIUM LACTATE AND CALCIUM CHLORIDE: 600; 310; 30; 20 INJECTION, SOLUTION INTRAVENOUS at 06:33

## 2022-08-19 ASSESSMENT — PAIN - FUNCTIONAL ASSESSMENT: PAIN_FUNCTIONAL_ASSESSMENT: NONE - DENIES PAIN

## 2022-08-19 NOTE — H&P
Bariatric Surgery: H&P      HPI:  The patient is a 33 y.o. female who is here to undergo EGD in preparation for weight loss surgery  Body mass index is 42.37 kg/m². Past Medical History:   Diagnosis Date    Abnormal Pap smear of cervix     Chronic constipation     Heartburn     History of anal fissures     History of hemorrhoids     HPV in female     HSV infection     Obesity    . ROS:  Constitutional: Negative for fever, chills. HENT: Negative for trouble swallowing. Eyes: Negative for visual disturbance. Respiratory: Negative for cough, shortness of breath. Cardiovascular: Negative for chest pain and palpitations. Gastrointestinal: Negative for nausea, vomiting, abdominal pain, diarrhea, blood in stool and abdominal distention. Positive for constipation. Endocrine: Negative for polydipsia, polyphagia and polyuria. Genitourinary: Negative for dysuria, frequency, hematuria and difficulty urinating. Musculoskeletal: Negative for myalgias, joint swelling. Skin: Negative for pallor and rash. Neurological: Negative for dizziness, tremors, light-headedness and headaches. Psychiatric/Behavioral: Negative for sleep disturbance and dysphoric mood. Allergies:   Allergies   Allergen Reactions    Percocet [Oxycodone-Acetaminophen] Nausea And Vomiting     Other reaction(s): severe Nausea And Vomiting; syncope    Vancomycin Hives       Past Surgical History:  Past Surgical History:   Procedure Laterality Date    COLONOSCOPY  10/2020    COLPOSCOPY      HEMORRHOID SURGERY      INSERTION OF CONTRACEPTIVE CAPSULE  2011    Nexplanon     INTRAUTERINE DEVICE INSERTION  2017    Mirena     REMOVAL OF CONTRACEPTIVE CAPSULE  2011    TONSILLECTOMY      3836    UMBILICAL HERNIA REPAIR      WISDOM TOOTH EXTRACTION      2010       Family History:  Family History   Problem Relation Age of Onset    Obesity Mother     Diabetes Mother         borderline     Cervical Cancer Mother 37    Alcohol Abuse Father     Other Father myeloproliferative disorder    Substance Abuse Sister     Thyroid Disease Brother     Other Brother         hearing loss     No Known Problems Maternal Grandmother     Obesity Maternal Grandfather     Kidney Disease Maternal Grandfather     Breast Cancer Paternal Grandmother        Social History:  Social History     Socioeconomic History    Marital status: Single     Spouse name: Not on file    Number of children: Not on file    Years of education: Not on file    Highest education level: Not on file   Occupational History    Not on file   Tobacco Use    Smoking status: Never    Smokeless tobacco: Never   Vaping Use    Vaping Use: Never used   Substance and Sexual Activity    Alcohol use: Yes     Comment: rare    Drug use: No    Sexual activity: Not Currently     Partners: Male     Birth control/protection: I.U.D.    Other Topics Concern    Not on file   Social History Narrative    Not on file     Social Determinants of Health     Financial Resource Strain: Low Risk     Difficulty of Paying Living Expenses: Not hard at all   Food Insecurity: No Food Insecurity    Worried About Running Out of Food in the Last Year: Never true    Ran Out of Food in the Last Year: Never true   Transportation Needs: Not on file   Physical Activity: Not on file   Stress: Not on file   Social Connections: Not on file   Intimate Partner Violence: Not on file   Housing Stability: Not on file       Current Facility-Administered Medications   Medication Dose Route Frequency Provider Last Rate Last Admin    lidocaine PF 1 % injection 1 mL  1 mL IntraDERmal Once PRN Arianna Cruz MD        lactated ringers infusion   IntraVENous Continuous Arianna Cruz  mL/hr at 08/19/22 0706 NoRateChange at 08/19/22 0706    sodium chloride flush 0.9 % injection 5-40 mL  5-40 mL IntraVENous 2 times per day Arianna Cruz MD        sodium chloride flush 0.9 % injection 5-40 mL  5-40 mL IntraVENous PRN Arianna Cruz MD        0.9 % sodium chloride infusion   IntraVENous PRN Darleene Goodpasture, MD        lidocaine PF 1 % injection                 Physical Exam  /80   Pulse 87   Temp 96.9 °F (36.1 °C) (Infrared)   Resp 15   Ht 5' 6\" (1.676 m)   Wt 262 lb 8 oz (119.1 kg)   LMP  (LMP Unknown)   SpO2 97%   BMI 42.37 kg/m²      General: Well-developed and well-nourished. No acute distress. Skin: Warm, dry and intact. HEENT: Normocephalic. EOMs intact. Conjunctivae normal. Neck supple. Cardiovascular: Normal rate, regular rhythm. Pulmonary/Chest: Normal effort. Lungs clear to auscultation. No rales, rhonchi or wheezing. Abdominal: Positive bowel sounds. Soft, nontender. Nondistended. Musculoskeletal: Movement x4. No edema. Neurological: Gait normal. Alert and oriented to person, place, and time. Psychiatric: Normal mood and affect. Speech and behavior normal. Judgment and thought content normal. Cognition and memory intact.     Assessment     Patient Active Problem List   Diagnosis    Chronic constipation    Family history of diabetes mellitus    History of anal fissures    History of hemorrhoids    Other irritable bowel syndrome    Anomaly of tooth position    Iron deficiency anemia    Organic sleep related movement disorders    Prediabetes    Gastroesophageal reflux disease without esophagitis    History of anemia    Morbid obesity with BMI of 40.0-44.9, adult ([facility] [address]     33year old female with morbid obesity, preoperative planning for weight loss surgery    Plan     EGD with possible biopsy  Discharge home post procedure    Gerardo Cortez MD  General Surgery PGY5

## 2022-08-19 NOTE — DISCHARGE INSTRUCTIONS
POST-ENDOSCOPY INSTRUCTIONS    1. ACTIVITY   No driving, operating machinery, or making important decisions for 24 hours. Resume normal activity after 24 hours. You may return to work after 24 hours. 2. DIET    EGD: Resume your usual diet unless specified below. 3. MEDICATIONS  (Do not consume alcohol, tranquilizers, or sleeping medications for 24 hours unless advised by your physician)                 Resume your usual medications    4. PHYSICIAN FOLLOW-UP                 Please continue with your previously scheduled appointments                 See your primary care physician as planned. 5. NORMAL CHANGES YOU MAY EXPERIENCE AFTER ENDOSCOPY:      EGD:  Sore throat, some abdominal pain and cramping. 6. CALL YOUR PHYSICIAN IF YOU EXPERIENCE ANY OF THE FOLLOWING      A. Passing blood rectally or vomiting blood (color may be red or black)      B. Severe abdominal pain or tenderness (that is not relieved by passing air)      C.   Fever, chills, or excessive sweating      D.  Persistent nausea or vomiting      E.  Redness or swelling at the IV site    If you have additional questions, PLEASE call your doctor or the [facility] center at [phone]

## 2022-08-19 NOTE — OP NOTE
[facility]  [facility] [place]  [address] [facility] [address]  Dept: [phone]  Loc: [phone]    Preoperative Diagnosis:  GERD, Morbid obesity, BMI of Body mass index is 42.37 kg/m². Postoperative Diagnosis: GERD, Morbid obesity, BMI of Body mass index is 42.37 kg/m². Procedure: Esophagogastroduodenoscopy with Biopsy    Surgeon: Case Patel DO    Assistant:    Anesthesia: MAC, see anesthesia records    Specimen:    1) Antrum for H. Pylori    Findings:   No hiatal hernia  Mild antral gastritis  Normal duodenum and esophagus    EBL: NONE    Operative Narrative: The risks and benefits were explained in detail to the patient who agreed and consented to the procedure. The patient was taken to the endoscopic suite and placed in a lateral position. Oxygen was administered via nasal cannula and a mouth guard was placed. MAC was administered via the anesthetic team.      The endoscope was then advanced into the oropharynx and down into the esophagus under direct visualization. The scope was further advanced through the esophagus, GE junction and stomach to the pylorus under visualization. The scope was passed through the pylorus and duodenal sweep performed, advancing and visualizing to the second portion of the duodenum. The scope was then withdrawn to the antrum and cold forceps were used to take biopsies of the antrum for H. Pylori. Appropriate hemostasis was noted. The scope was then retroflexed to visualize the GE junction. Evidence of a hiatal hernia was not noted. The scope was then slowly withdrawn through the GE junction. The Z line was noted. The stomach was then decompressed. The scope was withdrawn from the esophagus and no further lesions noted. The scope was withdrawn. The patient tolerated the procedure well. She will follow up with the [facility] for further assessment.

## 2022-08-19 NOTE — ANESTHESIA POSTPROCEDURE EVALUATION
POST- ANESTHESIA EVALUATION       Pt Name: Rene Anna  MRN: [id number]  [place]: 1990  Date of evaluation: 8/19/2022  Time:  8:11 AM      BP (!) 110/59   Pulse 75   Temp 97.1 °F (36.2 °C) (Skin)   Resp 15   Ht 5' 6\" (1.676 m)   Wt 262 lb 8 oz (119.1 kg)   LMP  (LMP Unknown)   SpO2 95%   BMI 42.37 kg/m²      Consciousness Level  Awake  Cardiopulmonary Status  Stable  Pain Adequately Treated YES  Nausea / Vomiting  NO  Adequate Hydration  YES  Anesthesia Related Complications NONE      Electronically signed by Mayito Abel MD on 8/19/2022 at 8:11 AM       Department of Anesthesiology  Postprocedure Note    Patient: Rene Anna  MRN: [id number]  [place]: 1990  Date of evaluation: 8/19/2022      Procedure Summary     Date: 08/19/22 Room / Location: [address]    Anesthesia Start: 0730 Anesthesia Stop: 1591    Procedure: EGD BIOPSY Diagnosis:       Gastroesophageal reflux disease, unspecified whether esophagitis present      Obesity, unspecified classification, unspecified obesity type, unspecified whether serious comorbidity present      (Gastroesophageal reflux disease, unspecified whether esophagitis present [K21.9])      (Obesity, unspecified classification, unspecified obesity type, unspecified whether serious comorbidity present [E66.9])    Surgeons: Porsha Chen DO Responsible Provider: Mayito Abel MD    Anesthesia Type: MAC ASA Status: 3          Anesthesia Type: No value filed.     Nawaf Phase I: Nawaf Score: 10    Nawaf Phase II: Nawaf Score: 9      Anesthesia Post Evaluation

## 2022-08-19 NOTE — ANESTHESIA PRE PROCEDURE
Department of Anesthesiology  Preprocedure Note       Name:  Marquis Perry   Age:  33 y.o.  :  1990                                          MRN:  [id number]         Date:  2022      Surgeon: Joanne Bray):  Naeem Parra DO    Procedure: Procedure(s):  EGD BIOPSY    Medications prior to admission:   Prior to Admission medications    Medication Sig Start Date End Date Taking? Authorizing Provider   cephALEXin (KEFLEX) 500 MG capsule  22   Historical Provider, MD   valACYclovir (VALTREX) 500 MG tablet TAKE ONE TABLET BY MOUTH DAILY 22   Minor Betters, APRN - CNP   polyethylene glycol (GLYCOLAX) 17 GM/SCOOP powder Take 17 g by mouth daily as needed    Historical Provider, MD       Current medications:    Current Facility-Administered Medications   Medication Dose Route Frequency Provider Last Rate Last Admin    lidocaine PF 1 % injection 1 mL  1 mL IntraDERmal Once PRN Ricky Flanagan MD        lactated ringers infusion   IntraVENous Continuous Ricky Flanagan  mL/hr at 22 0633 New Bag at 22 4314    sodium chloride flush 0.9 % injection 5-40 mL  5-40 mL IntraVENous 2 times per day Ricky Flanagan MD        sodium chloride flush 0.9 % injection 5-40 mL  5-40 mL IntraVENous PRN Ricky Flanagan MD        0.9 % sodium chloride infusion   IntraVENous PRN Ricky Flanagan MD           Allergies:     Allergies   Allergen Reactions    Percocet [Oxycodone-Acetaminophen] Nausea And Vomiting     Other reaction(s): severe Nausea And Vomiting; syncope    Vancomycin Hives       Problem List:    Patient Active Problem List   Diagnosis Code    Chronic constipation K59.09    Family history of diabetes mellitus Z83.3    History of anal fissures Z87.19    History of hemorrhoids Z87.19    Other irritable bowel syndrome K58.8    Anomaly of tooth position M26.30    Iron deficiency anemia D50.9    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    Prediabetes R73.03    Gastroesophageal reflux disease without esophagitis K21.9    History of anemia Z86.2    Morbid obesity with BMI of 40.0-44.9, adult ([facility]) E66.01, Z68.41       Past Medical History:        Diagnosis Date    Abnormal Pap smear of cervix     Chronic constipation     Heartburn     History of anal fissures     History of hemorrhoids     HPV in female     HSV infection     Obesity        Past Surgical History:        Procedure Laterality Date    COLONOSCOPY  10/2020    COLPOSCOPY      HEMORRHOID SURGERY      INSERTION OF CONTRACEPTIVE CAPSULE  2011    Nexplanon     INTRAUTERINE DEVICE INSERTION  2017    Mirena     REMOVAL OF CONTRACEPTIVE CAPSULE  2011    TONSILLECTOMY      0641    UMBILICAL HERNIA REPAIR      WISDOM TOOTH EXTRACTION      2010       Social History:    Social History     Tobacco Use    Smoking status: Never    Smokeless tobacco: Never   Substance Use Topics    Alcohol use: Yes     Comment: rare                                Counseling given: Not Answered      Vital Signs (Current):   Vitals:    08/19/22 0622   BP: 119/80   Pulse: 87   Resp: 15   Temp: 96.9 °F (36.1 °C)   TempSrc: Infrared   SpO2: 97%   Weight: 262 lb 8 oz (119.1 kg)   Height: 5' 6\" (1.676 m)                                              BP Readings from Last 3 Encounters:   08/19/22 119/80   08/17/22 137/83   07/18/22 113/75       NPO Status: Time of last liquid consumption: 1930                        Time of last solid consumption: 1930                        Date of last liquid consumption: 08/18/22                        Date of last solid food consumption: 08/18/22    BMI:   Wt Readings from Last 3 Encounters:   08/19/22 262 lb 8 oz (119.1 kg)   08/17/22 260 lb (117.9 kg)   07/18/22 260 lb (117.9 kg)     Body mass index is 42.37 kg/m².     CBC:   Lab Results   Component Value Date/Time    WBC 8.6 09/29/2020 12:00 AM    RBC 4.98 09/29/2020 12:00 AM    HGB 14.3 09/29/2020 12:00 AM    HCT 42.3 09/29/2020 12:00 AM MCV 84.9 09/29/2020 12:00 AM    RDW 12.7 09/29/2020 12:00 AM     09/29/2020 12:00 AM       CMP:   Lab Results   Component Value Date/Time     05/03/2019 12:00 AM    K 4.3 05/03/2019 12:00 AM     05/03/2019 12:00 AM    CO2 25 05/03/2019 12:00 AM    BUN 12 05/03/2019 12:00 AM    CREATININE 0.80 05/03/2019 12:00 AM    GFRAA >60 11/20/2016 02:25 PM    LABGLOM 101 05/03/2019 12:00 AM    LABGLOM >60 11/20/2016 02:25 PM    GLUCOSE 108 05/03/2019 12:00 AM    CALCIUM 9.0 05/03/2019 12:00 AM    BILITOT 0.3 05/03/2019 12:00 AM    ALKPHOS 72 05/03/2019 12:00 AM    AST 11 05/03/2019 12:00 AM    ALT 17 05/03/2019 12:00 AM       POC Tests: No results for input(s): POCGLU, POCNA, POCK, POCCL, POCBUN, POCHEMO, POCHCT in the last 72 hours. Coags: No results found for: PROTIME, INR, APTT    HCG (If Applicable):   Lab Results   Component Value Date    HCG NEGATIVE 08/19/2022        ABGs: No results found for: PHART, PO2ART, SPY0HPT, HXB1OQI, BEART, H3CDCVSI     Type & Screen (If Applicable):  No results found for: LABABO, LABRH    Drug/Infectious Status (If Applicable):  No results found for: HIV, HEPCAB    COVID-19 Screening (If Applicable): No results found for: COVID19        Anesthesia Evaluation  Patient summary reviewed and Nursing notes reviewed no history of anesthetic complications:   Airway: Mallampati: II  TM distance: >3 FB     Mouth opening: > = 3 FB   Dental: normal exam         Pulmonary:Negative Pulmonary ROS and normal exam  breath sounds clear to auscultation                             Cardiovascular:Negative CV ROS            Rhythm: regular  Rate: normal                    Neuro/Psych:   Negative Neuro/Psych ROS              GI/Hepatic/Renal:   (+) GERD:, morbid obesity          Endo/Other: Negative Endo/Other ROS                    Abdominal:   (+) obese,     Abdomen: soft. Vascular: negative vascular ROS.          Other Findings:           Anesthesia Plan      MAC     ASA 3 Anesthetic plan and risks discussed with patient. Plan discussed with CRNA.                     Scott Haynes MD   8/19/2022

## 2022-08-22 LAB — SURGICAL PATHOLOGY REPORT: NORMAL

## 2022-08-29 RX ORDER — VALACYCLOVIR HYDROCHLORIDE 500 MG/1
500 TABLET, FILM COATED ORAL DAILY
Qty: 30 TABLET | Refills: 3 | Status: SHIPPED | OUTPATIENT
Start: 2022-08-29

## 2022-08-30 DIAGNOSIS — Z86.2 HISTORY OF ANEMIA: ICD-10-CM

## 2022-08-30 DIAGNOSIS — K21.9 GASTROESOPHAGEAL REFLUX DISEASE WITHOUT ESOPHAGITIS: ICD-10-CM

## 2022-08-30 DIAGNOSIS — K58.8 OTHER IRRITABLE BOWEL SYNDROME: ICD-10-CM

## 2022-08-30 DIAGNOSIS — E66.01 MORBID OBESITY WITH BMI OF 40.0-44.9, ADULT (HCC): ICD-10-CM

## 2022-08-30 DIAGNOSIS — R73.03 PREDIABETES: ICD-10-CM

## 2022-09-13 ENCOUNTER — OFFICE VISIT (OUTPATIENT)
Dept: BARIATRICS/WEIGHT MGMT | Age: 32
End: 2022-09-13
Payer: OTHER GOVERNMENT

## 2022-09-13 VITALS
BODY MASS INDEX: 41.78 KG/M2 | WEIGHT: 260 LBS | HEART RATE: 87 BPM | HEIGHT: 66 IN | SYSTOLIC BLOOD PRESSURE: 120 MMHG | DIASTOLIC BLOOD PRESSURE: 77 MMHG

## 2022-09-13 DIAGNOSIS — R73.03 PREDIABETES: ICD-10-CM

## 2022-09-13 DIAGNOSIS — D50.9 IRON DEFICIENCY ANEMIA, UNSPECIFIED IRON DEFICIENCY ANEMIA TYPE: ICD-10-CM

## 2022-09-13 DIAGNOSIS — Z86.2 HISTORY OF ANEMIA: ICD-10-CM

## 2022-09-13 DIAGNOSIS — K21.9 GASTROESOPHAGEAL REFLUX DISEASE WITHOUT ESOPHAGITIS: Primary | ICD-10-CM

## 2022-09-13 DIAGNOSIS — K58.8 OTHER IRRITABLE BOWEL SYNDROME: ICD-10-CM

## 2022-09-13 DIAGNOSIS — E66.01 MORBID OBESITY WITH BMI OF 40.0-44.9, ADULT (HCC): ICD-10-CM

## 2022-09-13 PROCEDURE — 99213 OFFICE O/P EST LOW 20 MIN: CPT | Performed by: NURSE PRACTITIONER

## 2022-09-13 NOTE — PROGRESS NOTES
Medical Nutrition Therapy   Metabolic and Bariatric Surgery         Supervised diet and exercise preparation  Visit 3 out of 3  Pt reports:      Changes in eating patterns to promote health are noted below on the goals number 19-22    Vitals: Wt Readings from Last 3 Encounters:   09/13/22 260 lb (117.9 kg)   08/19/22 262 lb 8 oz (119.1 kg)   08/17/22 260 lb (117.9 kg)         Nutrition Assessment:   PES: Knowledge deficit related to healthy behaviors that support weight management post weight loss surgery as evidenced by Body mass index is 41.97 kg/m². Nutrition Assessment of Goal Attainment:  TREATMENT GOALS:    1. Pt  Completed 4 out of 4 goals. 2.TREATMENT GOALS FOR UPCOMING WEEK: continue all previous goals and add: # 0    All goals were planned with and agreed on by the patient. I want to improve my health because I want to live life. appt # NA G What is your next step? C 1 2 3 4 5 6 7 8 9     0  1 I will read the education binder provided to me and the   x 100               2 I will make my pschological evaluation appoinment. x              2  3 I will bring this goal card to every appointment. 100 100 100            x 4 I will eliminate all tobacco/nicotine. x 5 I will limit alcoholic beverages to 6-1YZ per week. x 6 I will limit dining out to 3 times per week or less. x 7 I will eliminate sugary beverages. x 8 I will eliminate carbonated beverages. x 9 I will eliminate drinking with a straw. 2  10 I will limit caffeinated beverages to 16oz daily. 2  11 I will limit log my exercise daily. 100 100 100           2  12 I will determine my calcium and mvi plan. 100            x 13 I will have 1-2 servings of lean protein present at each meal and minimeal.                x 14 I will eat every 3-5 hours. 15 I will drink 64oz of fluid daily.  x              2  16 I will eat slowly during meals and snacks. 100             17 I will limit fluids 4oz before after and during meals. 18 I will eat protein first at all meals followed by vegetables,  Fruit and lastly whole grains. 2  19 My first weight neutral approach is:  I will meal prep and bring lunch. 100            2  20 My second weight neutral approach is:  I will continue healthier snacks. 100              21  My Thirds weight neutral approach is:                 22                  23                  24                  25                    Questions asked for goal understanding:                                                  Do you understand your goals? Do you have the information you need to achieve your goals? Do you have any questions  right now? []  Consistent goal achievement in the program thus far and further success with goals is expected. []  Unable to consistently make progress in goal achievement. At this time patient is not moving forward  in developing the skills needed for success after surgery. Plan:    Continue to follow monthly and review goals.          []  Nutrition visits complete    [x]

## 2022-09-13 NOTE — PROGRESS NOTES
Medical Weight Management Progress Note    Subjective     Patient being seen for medically supervised weight loss for the chronic conditions of Prediabetes, IBS, Chronic Constipation, GERD, Hx Anemia. She is working on the behavior changes discussed at the initial appointment. Patient continues on diet plan. Physical activity includes walking. Weight loss of 0 lbs since last visit. Psych eval completed and clearance received. EGD completed and H Pylori negative. No current issues. Working toward bariatric surgery:    [x] Sleeve Gastrectomy                                                           [] Sydni-en-Y Gastric Bypass    Allergies: Allergies   Allergen Reactions    Percocet [Oxycodone-Acetaminophen] Nausea And Vomiting     Other reaction(s): severe Nausea And Vomiting; syncope    Vancomycin Hives       Past Medical History:     Past Medical History:   Diagnosis Date    Abnormal Pap smear of cervix     Chronic constipation     Heartburn     History of anal fissures     History of hemorrhoids     HPV in female     HSV infection     Obesity    .     Past Surgical History:  Past Surgical History:   Procedure Laterality Date    COLONOSCOPY  10/2020    COLPOSCOPY      ESOPHAGOGASTRODUODENOSCOPY  08/19/2022    BIOPSY    HEMORRHOID SURGERY      INSERTION OF CONTRACEPTIVE CAPSULE  2011    Nexplanon     INTRAUTERINE DEVICE INSERTION  2017    Mirena     REMOVAL OF CONTRACEPTIVE CAPSULE  2011    TONSILLECTOMY      3263    UMBILICAL HERNIA REPAIR      UPPER GASTROINTESTINAL ENDOSCOPY N/A 8/19/2022    EGD BIOPSY performed by Sarah Hernandez DO at [address] EXTRACTION      2010       Family History:  Family History   Problem Relation Age of Onset    Obesity Mother     Diabetes Mother         borderline     Cervical Cancer Mother 37    Alcohol Abuse Father     Other Father         myeloproliferative disorder    Substance Abuse Sister     Thyroid Disease Brother     Other Brother hearing loss     No Known Problems Maternal Grandmother     Obesity Maternal Grandfather     Kidney Disease Maternal Grandfather     Breast Cancer Paternal Grandmother        Social History:  Social History     Socioeconomic History    Marital status: Single     Spouse name: Not on file    Number of children: Not on file    Years of education: Not on file    Highest education level: Not on file   Occupational History    Not on file   Tobacco Use    Smoking status: Never    Smokeless tobacco: Never   Vaping Use    Vaping Use: Never used   Substance and Sexual Activity    Alcohol use: Yes     Comment: rare    Drug use: No    Sexual activity: Not Currently     Partners: Male     Birth control/protection: I.U.D.    Other Topics Concern    Not on file   Social History Narrative    Not on file     Social Determinants of Health     Financial Resource Strain: Low Risk     Difficulty of Paying Living Expenses: Not hard at all   Food Insecurity: No Food Insecurity    Worried About Running Out of Food in the Last Year: Never true    Ran Out of Food in the Last Year: Never true   Transportation Needs: Not on file   Physical Activity: Not on file   Stress: Not on file   Social Connections: Not on file   Intimate Partner Violence: Not on file   Housing Stability: Not on file       Current Medications:  Current Outpatient Medications   Medication Sig Dispense Refill    valACYclovir (VALTREX) 500 MG tablet Take 1 tablet by mouth daily 30 tablet 3    cephALEXin (KEFLEX) 500 MG capsule       polyethylene glycol (GLYCOLAX) 17 GM/SCOOP powder Take 17 g by mouth daily as needed       Current Facility-Administered Medications   Medication Dose Route Frequency Provider Last Rate Last Admin    levonorgestrel (MIRENA) IUD 52 mg 1 each  1 each IntraUTERine Once GRACE Carroll - CNP           Vital Signs:  /77 (Site: Right Upper Arm, Position: Sitting, Cuff Size: Large Adult)   Pulse 87   Ht 5' 6\" (1.676 m)   Wt 260 lb (117.9 kg)   LMP  (LMP Unknown)   BMI 41.97 kg/m²     BMI/Height/Weight:  Body mass index is 41.97 kg/m². Review of Systems - A review of systems was performed. All was negative unless otherwise documented in HPI. Constitutional: Negative for fever, chills. HENT: Negative for trouble swallowing. Eyes: Negative for visual disturbance. Respiratory: Negative for cough, shortness of breath. Cardiovascular: Negative for chest pain and palpitations. Gastrointestinal: Negative for nausea, vomiting, abdominal pain, diarrhea, blood in stool and abdominal distention. Positive for constipation. Endocrine: Negative for polydipsia, polyphagia and polyuria. Genitourinary: Negative for dysuria, frequency, hematuria and difficulty urinating. Musculoskeletal: Negative for myalgias, joint swelling. Skin: Negative for pallor and rash. Neurological: Negative for dizziness, tremors, light-headedness and headaches. Psychiatric/Behavioral: Negative for sleep disturbance and dysphoric mood. Objective:      Physical Exam   Vital signs reviewed. General: Well-developed and well-nourished. No acute distress. Skin: Warm, dry and intact. HEENT: Normocephalic. EOMs intact. Conjunctivae normal. Neck supple. Cardiovascular: Normal rate, regular rhythm. Pulmonary/Chest: Normal effort. Lungs clear to auscultation. No rales, rhonchi or wheezing. Abdominal: Positive bowel sounds. Soft, nontender. Nondistended. Musculoskeletal: Movement x4. No edema. Neurological: Gait normal. Alert and oriented to person, place, and time. Psychiatric: Normal mood and affect. Speech and behavior normal. Judgment and thought content normal. Cognition and memory intact. Assessment:       Diagnosis Orders   1. Gastroesophageal reflux disease without esophagitis  Nicotine, Blood      2. Morbid obesity with BMI of 40.0-44.9, adult (HCC)  Nicotine, Blood      3.  Iron deficiency anemia, unspecified iron deficiency anemia type Nicotine, Blood      4. Prediabetes  Nicotine, Blood      5. History of anemia  Nicotine, Blood      6. Other irritable bowel syndrome  Nicotine, Blood          Plan:    Dietitian visit today. Patient was encouraged to journal all food intake. Keep calorie level at approximately 3860-5240. Protein intake is to be a minimum of 60-80 grams per day. Water drinking was encouraged with a goal of 64oz-128oz daily. Beverages to be calorie free except for milk. Every other beverage should be water. Avoid soda. Continue to increase level of physical activity. Encouraged use of exercise log. Patients will undergo thorough nutritional evaluation and counseling with our registered dietician. Our program provides long term nutritional counseling with unlimited consults with the dietician. All female patients have been educated on the importance of using reliable birth control and avoiding pregnancy the first 18 months - 2 years following bariatric surgery. All female patient will have a pregnancy test done with the pre-admission testing. All patients are nicotine tested and require a negative nicotine level prior to surgery. Patient has been educated about the risks associated with substance use, as well as the risks of using nicotine and alcohol after surgery. Patient has been educated that theses substances need to be avoided lifelong after surgery to reduce the risk of complications and sub-optimal weight loss. EGD report reviewed. H Pylori negative. Nicotine level ordered per surgeon protocol. Follow-up  No follow-ups on file. Orders this encounter:  Orders Placed This Encounter   Procedures    Nicotine, Blood     Standing Status:   Future     Standing Expiration Date:   9/13/2023         Prescriptions this encounter:  No orders of the defined types were placed in this encounter.       Electronically signed by:  Dayan Rodriguez CNP

## 2022-09-15 ENCOUNTER — TELEPHONE (OUTPATIENT)
Dept: BARIATRICS/WEIGHT MGMT | Age: 32
End: 2022-09-15

## 2022-09-15 NOTE — TELEPHONE ENCOUNTER
Patient's record has been submitted to the insurance company on 9/15/22 for authorization to schedule surgery. Instructions to patient: if patient calls for status on authorization to schedule surgery please instruct patient that it could take up to 30 days for an authorization. Once authorization is received the insurance specialists will contact the patient to schedule their procedure.       Program fee paid in full yes

## 2022-09-19 NOTE — TELEPHONE ENCOUNTER
Patient called and states she contacted insurance company this morning and they don't have her request for authorization. I left her know it was faxed on 9/15/22 and she needs to give the insurance company time to process fax and could take up to 72 hours to show received in there system. And then it can take up to 30 business days for approval.      I did tell patient I can refax medical records as a follow up. I did refax to [facility]. Patient verbalized understanding.

## 2022-09-20 ENCOUNTER — TELEPHONE (OUTPATIENT)
Dept: FAMILY MEDICINE CLINIC | Age: 32
End: 2022-09-20

## 2022-09-20 NOTE — TELEPHONE ENCOUNTER
----- Message from Madiha Moore sent at 9/20/2022  3:13 PM EDT -----  Subject: Appointment Request    Reason for Call: Established Patient Appointment needed: Urgent (Patient   Request) ED Follow Up Visit    QUESTIONS    Reason for appointment request? Available appointments did not meet   patient need     Additional Information for Provider?  Pt needs orders for an ultrasound and   a callback to schedule ED f/u appt, [facility]gent care [facility] on 9/17,   strings from IUD m missing, GREEN.   ---------------------------------------------------------------------------  --------------  Warden Heck QS  [phone]; OK to leave message on voicemail  ---------------------------------------------------------------------------  --------------  SCRIPT ANSWERS  COVID Screen: Jia Tinoco

## 2022-10-04 ENCOUNTER — HOSPITAL ENCOUNTER (OUTPATIENT)
Age: 32
Setting detail: SPECIMEN
Discharge: HOME | End: 2022-10-04

## 2022-10-04 ENCOUNTER — OFFICE VISIT (OUTPATIENT)
Dept: OBGYN CLINIC | Age: 32
End: 2022-10-04
Payer: OTHER GOVERNMENT

## 2022-10-04 VITALS
WEIGHT: 261 LBS | SYSTOLIC BLOOD PRESSURE: 120 MMHG | BODY MASS INDEX: 41.95 KG/M2 | HEIGHT: 66 IN | HEART RATE: 83 BPM | DIASTOLIC BLOOD PRESSURE: 76 MMHG

## 2022-10-04 DIAGNOSIS — E66.01 MORBID OBESITY WITH BMI OF 40.0-44.9, ADULT (HCC): ICD-10-CM

## 2022-10-04 DIAGNOSIS — R73.03 PREDIABETES: ICD-10-CM

## 2022-10-04 DIAGNOSIS — Z11.51 SCREENING FOR HPV (HUMAN PAPILLOMAVIRUS): ICD-10-CM

## 2022-10-04 DIAGNOSIS — Z01.419 WELL WOMAN EXAM: Primary | ICD-10-CM

## 2022-10-04 DIAGNOSIS — Z86.2 HISTORY OF ANEMIA: ICD-10-CM

## 2022-10-04 DIAGNOSIS — K58.8 OTHER IRRITABLE BOWEL SYNDROME: ICD-10-CM

## 2022-10-04 DIAGNOSIS — D50.9 IRON DEFICIENCY ANEMIA, UNSPECIFIED IRON DEFICIENCY ANEMIA TYPE: ICD-10-CM

## 2022-10-04 DIAGNOSIS — K21.9 GASTROESOPHAGEAL REFLUX DISEASE WITHOUT ESOPHAGITIS: ICD-10-CM

## 2022-10-04 DIAGNOSIS — Z30.431 IUD CHECK UP: ICD-10-CM

## 2022-10-04 PROCEDURE — 99395 PREV VISIT EST AGE 18-39: CPT | Performed by: CLINICAL NURSE SPECIALIST

## 2022-10-04 NOTE — PROGRESS NOTES
[facility] [place]  [address] [place] [address] [address] [id number]  Dept: [phone]        DATE OF VISIT:  10/4/22        History and Physical    Gerard Arce    :  1990  CHIEF COMPLAINT:    Chief Complaint   Patient presents with    New Patient                    Gerard Arce is a 33 y.o. female new patient presents for annual well woman exam.  Patient states that she cant feel her IUD strings. The patient was seen and examined. Per the patient bowels areregular. She has no voiding complaints. She denies any bloating as well as vaginal discharge. Chaperone for Intimate Exam  Chaperone was offered as part of the rooming process. Patient declined and agrees to continue with exam without a chaperone.   Chaperone: none     _____________________________________________________________________  Past Medical History:   Diagnosis Date    Abnormal Pap smear of cervix     Chronic constipation     Heartburn     History of anal fissures     History of hemorrhoids     HPV in female     HSV infection     IBS (irritable bowel syndrome)     Obesity                                                                    Past Surgical History:   Procedure Laterality Date    COLONOSCOPY  10/2020    COLPOSCOPY      ESOPHAGOGASTRODUODENOSCOPY  2022    BIOPSY    HEMORRHOID SURGERY      INSERTION OF CONTRACEPTIVE CAPSULE      Nexplanon     INTRAUTERINE DEVICE INSERTION  2017    Mirena     REMOVAL OF CONTRACEPTIVE CAPSULE      TONSILLECTOMY      6226    UMBILICAL HERNIA REPAIR      UPPER GASTROINTESTINAL ENDOSCOPY N/A 2022    EGD BIOPSY performed by Sourav Blunt DO at [address] EXTRACTION      2010     Family History   Problem Relation Age of Onset    Obesity Mother     Diabetes Mother         borderline     Cervical Cancer Mother 37    Cancer Father     Alcohol Abuse Father     Other Father myeloproliferative disorder    Substance Abuse Sister     Thyroid Disease Brother     Other Brother         hearing loss     No Known Problems Maternal Grandmother     Obesity Maternal Grandfather     Kidney Disease Maternal Grandfather     Breast Cancer Paternal Grandmother      Social History     Tobacco Use   Smoking Status Never   Smokeless Tobacco Never     Social History     Substance and Sexual Activity   Alcohol Use Not Currently     Current Outpatient Medications   Medication Sig Dispense Refill    valACYclovir (VALTREX) 500 MG tablet Take 1 tablet by mouth daily 30 tablet 3    polyethylene glycol (GLYCOLAX) 17 GM/SCOOP powder Take 17 g by mouth daily as needed       Current Facility-Administered Medications   Medication Dose Route Frequency Provider Last Rate Last Admin    levonorgestrel (MIRENA) IUD 52 mg 1 each  1 each IntraUTERine Once GRACE Boggs CNP           Allergies: Allergies   Allergen Reactions    Percocet [Oxycodone-Acetaminophen] Nausea And Vomiting     Other reaction(s): severe Nausea And Vomiting; syncope    Vancomycin Hives       Gynecologic History:  No LMP recorded (lmp unknown). (Menstrual status: IUD).   Sexually Active: No  STD History:No; HSV-2 and chlamydia   Birth Control: Yes; IUD    OB History    Para Term  AB Living   1 1 1 0 0 1   SAB IAB Ectopic Molar Multiple Live Births   0 0 0 0 0 1     ______________________________________________________________________    Review of Systems    REVIEW OFSYSTEMS:        Constitutional:  Unexpected weight change, extreme fatigue, night sweats              no  Skin:                           Rashes, moles   no  Neurological:  Frequentheadaches, seizures         no  Ophthalmic:  Recent visual changes no  ENT:   Difficulty swallowing  no  Breast:              Masses, pain, nipple discharge                            no     Respiratory:  Shortness of breath, coughing           Yes; tested positive for COVID 10 days ago    Cardiovascular: Chest pain   no     Gastrointestinal: Chronic diarrhea/constipation, nausea/vomiting           Yes; takes miralax daily   Urogenital:  Urinary incontinence, frequency, urgency          no                                         Heavy/irregular periods           no; not having periods                                      Vaginal discharge                   no  Hematological: Bruises easy   no     Endocrine:  Hot flashes   no     Hot/Cold Intolerance  no    Psychological:            Mood and affect were within normal limits. no                 Physical Exam    Physical Exam:    Vitals:    10/04/22 1326   BP: 120/76   Pulse: 83   Weight: 261 lb (118.4 kg)   Height: 5' 6\" (1.676 m)       General Appearance: This  is a well developed, well nourished, well groomed female. Her BMI was reviewed. Nutritional decision making andexercise were discussed. Neurological:  The patient is alert and oriented to time,place, person, and situation    Skin:  A brief inspection of the skin revealed no rashes or lesions. Neck:  The neck was supple. Respiratory: There was unlabored respiratory effort. Lungs clear to ascultation. Cardiovascular: The patients extremities were without calf tenderness or edema. Heart with a regular rate and rhythm. Abdomen: The abdomen was soft and non-tender with no guarding, rebound or rigidity. No hernias were appreciated. Breast:   The patients breasts were symmetrical.  There were no masses, discharge or retractions noted. Self breast exams were reviewed. Pelvic Exam:  The external genitalia was with a normal appearance. The vaginal vault was normal. There were no cystocele, rectocele, or enterocele appreciated. There was no vaginal discharge. The cervix was without lesions. There was no cervical motion tenderness. IUD strings noted but short. The uterus was mobile, midline and regular.     The adnexa no fullness, tenderness or masses appreciated. ASSESSMENT: Normal annual well woman exam    33 y.o. Female; Annual   Diagnosis Orders   1. Well woman exam  PAP SMEAR      2. IUD check up        3. Screening for HPV (human papillomavirus)  PAP SMEAR                        PLAN:  - Discussed new papsmear guidelines. - Birth control Discussed. - Smoking risk factors Discussed  - Diet and exercise reviewed. - Routine healthmaintenance per patients PCP.  - Return to clinic in 1 year or earlier with questions, problems, concerns. Return for 1 year for Annual and as needed.         Electronically signed by GRACE Hernandez CNP on 10/4/2022 at 1:53 PM

## 2022-10-06 LAB
HPV SAMPLE: NORMAL
HPV, GENOTYPE 16: NOT DETECTED
HPV, GENOTYPE 18: NOT DETECTED
HPV, HIGH RISK OTHER: NOT DETECTED
HPV, INTERPRETATION: NORMAL
SPECIMEN DESCRIPTION: NORMAL

## 2022-10-09 LAB
COTININE: <5 NG/ML
NICOTINE: <5 NG/ML

## 2022-10-11 LAB — CYTOLOGY REPORT: NORMAL

## 2022-10-17 ENCOUNTER — NURSE ONLY (OUTPATIENT)
Dept: BARIATRICS/WEIGHT MGMT | Age: 32
End: 2022-10-17

## 2022-10-17 RX ORDER — SODIUM CHLORIDE, SODIUM LACTATE, POTASSIUM CHLORIDE, CALCIUM CHLORIDE 600; 310; 30; 20 MG/100ML; MG/100ML; MG/100ML; MG/100ML
1000 INJECTION, SOLUTION INTRAVENOUS CONTINUOUS
Status: CANCELLED | OUTPATIENT
Start: 2022-10-17

## 2022-10-17 NOTE — DISCHARGE INSTRUCTIONS
Preoperative Instructions:    Stop drinking clear liquids at midnight the night prior to surgery. **gatorade per surgeon- no later than 10:30 AM please    Arrive at the surgery center (Entrance B) by 10:30 AM on 11/1/2022  (or as directed by your surgeon's office). Please stop any blood thinning medications as directed by your surgeon or prescribing physician. Failure to stop certain medications may interfere with your scheduled surgery. These may include:  Aspirin, Warfarin (Coumadin), Clopidogrel (Plavix), Ibuprofen (Motrin, Advil), Naproxen (Aleve), Meloxicam (Mobic), Celecoxib (Celebrex), Eliquis, Pradaxa, Xarelto, Effient, Fish Oil, Herbal supplements. You may continue the rest of your medications through the night before surgery unless instructed otherwise. Please take only the following medication(s) the day of surgery with a small sip of water:    Please use and bring inhalers the day of surgery. Please bring CPAP the day of surgery. ____________________________  ____________________________  Signature (Patient)           Signature/date(Provider)      REMINDERS:  ** If you are going home the day of your procedure, you will need a friend or family member to drive you home after your procedure. Your  must be 25years of age or older and able to sign off on your discharge instructions. Taxi cabs or any form of public transportation is not acceptable. ** It is preferable that the friend or family member stay at the hospital throughout your procedure. ** If you are going home the same day as your procedure, someone must remain with you for the first 24 hours after your surgery if you receive anesthesia or sedation. If you do not have someone to stay with you, your procedure may be cancelled. **  Please do not wear any jewelry or body piercings the day of surgery. PREPARING FOR YOUR SURGERY:     Before surgery, you can play an important role in your own health.  Because skin is not sterile, we need to be sure that your skin is as free of germs as possible before surgery by carefully washing before surgery. Preparing or prepping skin before surgery can reduce the risk of a surgical site infection.   Do not shave the area of your body where your surgery will be performed unless you received specific permission from your physician. You will need to shower at home the night before surgery and the morning of surgery with a special soap called chlorhexidine gluconate (CHG*). *Not to be used by people allergic to Chlorhexidine Gluconate (CHG). Following these instructions will help you be sure that your skin is clean before surgery. Instructions on cleaning your skin before surgery: The night before your surgery: You will need to shower with warm water (not hot) and the CHG soap. Use a clean wash cloth and a clean towel. Have clean clothes available to put on after the shower. First wash your hair with regular shampoo. Rinse your hair and body thoroughly to remove the shampoo. Wash your face with your regular soap or water only. Thoroughly rinse your body with warm water from the neck down. Turn water off to prevent rinsing the soap off too soon. With a clean wet washcloth and half of the CHG soap in the bottle, lather your entire body from the neck down. Do not use CHG soap near your eyes or ears to avoid injury to those areas. Wash thoroughly, paying special attention to the area where your surgery will be performed. Wash your body gently for five (5) minutes. Avoid scrubbing your skin too hard. Turn the water back on and rinse your body thoroughly. Pat yourself dry with a clean, soft towel. Do not apply lotion, cream or powder. Dress with clean freshly washed clothes. The morning of surgery:    Repeat shower following steps above  - using remaining half of CHG soap in bottle.      If you have any questions, call the Pre-Admission Testing Unit at [phone]. Day of Surgery/Procedure    As a patient at [facility] you can expect quality medical and nursing care that is centered on your individual needs. Our goal is to make your surgical experience as comfortable as possible  . Directions to the [address] [place] [address] is located at [address] [facility], [address]. Please pull into the Emergency [address] parking lot (Entrance B) and park in that lot. We also have additional parking across the street. You will enter the facility following the [address] sign. Please stop at the reception desk where you will be checked in by the staff. If you have any questions please call [phone]. Transportation after your procedure. You will need a friend or family member to drive you home after your procedure. Your  must be 25years of age or older and able to sign off on your discharge instructions. Taxi cabs or any form of public transportation is not acceptable. It is preferable that the friend or family member stay at the hospital throughout your procedure. Someone must remain at home with you for the first 24 hours after your surgery if you receive anesthesia or sedation. If you do not have someone to stay with you, your procedure may be cancelled. Patient Instructions    If you are having any type of anesthesia you are to have nothing to eat or drink after midnight the night before your surgery. This includes gum, mints, water or smoking or chewing tobacco.  The only exception to this is a small sip of water to take with any morning dose of heart, blood pressure, or seizure medications. Bring a list of all medications you take, along with the dose of the medications and how often you take it. If more convenient bring the pharmacy bottles in a zip lock bag.       Please shower the night before and the morning of surgery with an antibacterial soap. Please use the wipes given to you the night before your surgery after your shower. Unless otherwise told by your physician, please do not shave legs or any part of your body below your neck the night before or day of your surgery. You may shave your face or neck. Brush your teeth but do not swallow water. Bring your inhaler if you are currently using one. Bring your eyeglasses and case with you. No contacts are to be worn the day of surgery. You also may bring your hearing aids. Bring your blood band if one has been given to you. Please do not close the clasp. If you are on C-PAP or Bi-PAP at home and plan on staying in the hospital overnight for your surgery please bring the machine with you. Do not wear any jewelry or body piercings day of surgery. Also, NO lotion, perfume or deodorant to be used the day of surgery. Do not bring any valuables, such as jewelry, cash or credit cards. If you are staying overnight with us, please bring a SMALL bag of personal items. We cannot accommodate large items, like suitcases. Please wear loose, comfortable clothing. If you are potentially going to have a cast or brace bring clothing that will fit over them. In case of illness - If you have cold or flu like symptoms (high fever, runny nose, sore throat, cough, etc.) rash, nausea, vomiting, loose stools, and/or recent contact with someone who has a contagious disease (chicken pox, measles, etc.) Please call your doctor before coming to the hospital.      If your child is having surgery please make arrangements for any other children to be cared for at home on the day of surgery. Other children are not permitted in recovery room and we want you to be able to spend time with the patient.   If other arrangements are not available then we suggest that you have a second adult to stay in the waiting room.       If you have any other questions regarding your procedure or the day of surgery, please call [phone], or [phone]

## 2022-10-18 NOTE — H&P (VIEW-ONLY)
History and Physical    Pt Name: Richie Clark  MRN: [id number]  YOB: 1990  Date of evaluation: 10/19/2022  Primary Care Physician: GRACE Carlin CNP    SUBJECTIVE:   History of Chief Complaint:    Richie Clark is a 33 y.o. female who presents for PAT appointment. Patient complains of obesity, especially after childbirth 5 years ago. She has several co-morbid conditions/complications of obesity that would be helped with weight loss; GERD and prediabetes. Patient has been scheduled for XI ROBOTIC LAPAROSCOPIC GASTRECTOMY SLEEVE, LIVER BIOPSY, EGD   Allergies  is allergic to percocet [oxycodone-acetaminophen] and vancomycin. Medications  Prior to Admission medications    Medication Sig Start Date End Date Taking? Authorizing Provider   valACYclovir (VALTREX) 500 MG tablet Take 1 tablet by mouth daily 8/29/22   GRACE Carlin CNP   polyethylene glycol (GLYCOLAX) 17 GM/SCOOP powder Take 17 g by mouth daily    Historical Provider, MD     Past Medical History    has a past medical history of Abnormal Pap smear of cervix, Chronic constipation, COVID-19, Heartburn, History of anal fissures, History of hemorrhoids, HPV in female, HSV infection, IBS (irritable bowel syndrome), Obesity, Prediabetes, and Wellness examination. Past Surgical History   has a past surgical history that includes Umbilical hernia repair (1995); Tonsillectomy; [place] tooth extraction; Colposcopy; Colonoscopy (10/2020); Hemorrhoid surgery; and Upper gastrointestinal endoscopy (N/A, 08/19/2022). Social History   reports that she has never smoked. She has never used smokeless tobacco.    reports that she does not currently use alcohol. reports no history of drug use.    Marital Status single  Children a 11 yr old son  Occupation , [employer] [address] History  Family Status   Relation Name Status    Mother  Alive    Father  Alive    Sister  (Not Specified)    Brother  Alive    Brother  Alive MGM  Alive    MGF   at age 58        kidney infection     PGM  (Not Specified)     family history includes Alcohol Abuse in her father; Breast Cancer in her paternal grandmother; Cancer in her father; Cervical Cancer (age of onset: 37) in her mother; Diabetes in her mother; Kidney Disease in her maternal grandfather; No Known Problems in her maternal grandmother; Obesity in her maternal grandfather and mother; Other in her brother and father; Substance Abuse in her sister; Thyroid Disease in her brother. Review of Systems:  CONSTITUTIONAL:   negative for fevers, chills, fatigue and malaise    EYES:   negative for double vision, blurred vision and photophobia    HEENT:   negative for tinnitus, epistaxis and sore throat     RESPIRATORY:   negative for cough, shortness of breath, wheezing     CARDIOVASCULAR:   negative for chest pain, palpitations, syncope, edema     GASTROINTESTINAL:   negative for nausea, vomiting     GENITOURINARY:   negative for incontinence     MUSCULOSKELETAL:   negative for neck or back pain     NEUROLOGICAL:   Negative for weakness and tingling  negative for headaches and dizziness     PSYCHIATRIC:   negative for anxiety       OBJECTIVE:   VITALS:  height is 5' 6\" (1.676 m) and weight is 258 lb (117 kg). Her temporal temperature is 97.8 °F (36.6 °C). Her blood pressure is 121/70 and her pulse is 92. Her respiration is 16 and oxygen saturation is 95%. CONSTITUTIONAL:alert & oriented x 3, no acute distress. Friendly. Very pleasant  SKIN:  Warm and dry, no rashes on exposed areas of skin, long artificial nails bilateral hands  HEAD:  Normocephalic, atraumatic   EYES: EOMs intact. PERRL. EARS:  Equal bilaterally, no edema or thickening, skin is intact without lumps or lesions. No discharge. Hearing grossly WNL. NOSE:  Nares patent.   No rhinorrhea   MOUTH/THROAT:  Mucous membranes moist, tongue is pink, uvula midline, teeth appear to be intact  NECK:full ROM  LUNGS: Respirations even and non-labored. Clear to auscultation bilaterally, no wheezes, rales, or rhonchi. CARDIOVASCULAR: Regular rate and rhythm, no murmurs/rubs/gallops   ABDOMEN: soft, non-tender, non-distended, bowel sounds active x 4, obese  EXTREMITIES: No edema bilateral lower extremities. No varicosities bilateral lower extremities. NEUROLOGIC: CN II-XII are grossly intact.  Gait is smooth, rhythmic and effortless     Testing:   EKG: 10/19/22  Labs pending: drawn 10/19/2022   Chest XRay: 10/19/22  IMPRESSIONS:   Obesity  PLANS:   XI ROBOTIC LAPAROSCOPIC GASTRECTOMY SLEEVE, LIVER BIOPSY, EGD     GRACE DISLA CNP  Electronically signed 10/19/2022 at 9:25 AM

## 2022-10-18 NOTE — PROGRESS NOTES
Anesthesia Focused Assessment    Hx of anesthesia complications:  1451- propofol wore off during colonoscopy, but patient does not recall- states she is only aware of this bc her mother works for the endoscopy center where she had it done ([place] of [place]) and told her. Family hx of anesthesia complications:  no    METS functional capacity:   -Moderate/Excellent: >4 climbing >/= 1 flight of stairs without stopping     Prior + Covid-19 test? 9/23/22  Symptoms/Hospitalization?:fatigue, body aches x 3 days; cough x 5 days  Patient vaccinated: yes      STOP-BANG Sleep Apnea Questionnaire    SNORE loudly (heard through closed doors)? No  TIRED, fatigued, sleepy during daytime? No  OBSERVED stopping breathing during sleep? No  High blood PRESSURE or being treated? No    BMI over 35? Yes  AGE over 48? No  NECK circumference over 16\"? No  GENDER (male)? No             Total one  High risk 5-8  Intermediate risk 3-4  Low risk 0-2    ----------------------------------------------------------------------------------------------------------------------  ANTON:                                             no  If yes, machine?:                              Type 1 DM:                                   no  T2DM:                                           no, prediabetes, no Rx    Coronary Artery Disease:             no  Hypertension:                               no  Defib / AICD / Pacemaker:           no    Renal Failure:                               no  If yes, on dialysis? :                           Active smoker:                              no  Drinks Alcohol:                              no  Illicit drugs:                                    no    Dentition:                                      benign    Past Medical History:   Diagnosis Date    Abnormal Pap smear of cervix     Chronic constipation     COVID-19 09/23/2022    fatigue, body aches x 3 days; cough x 5 days    Heartburn     occasional History of anal fissures     History of hemorrhoids     HPV in female     HSV infection     IBS (irritable bowel syndrome)     Obesity     Prediabetes     no Rx required, borderline    Wellness examination     TEDDY Rojas; has appt 10/25/22 for clearance       Patient was evaluated in PAT & anesthesia guidelines were applied. NPO guidelines, medication instructions and scheduled arrival time were reviewed with patient. I advised patient/patient family to please contact surgeons office, ahead of time if possible, if any new signs or symptoms of illness, infection, rash, etc. Patient/ patient family verbalize understanding. Patient was sent for post PAT anesthesia interview for covid + history   ADDENDUM at 0930: Pt met with joan Chan to proceed as scheduled                                                                                                                     Medical clearance pending, will request copy be obtained.  Pt has pre-op appt with ANNALEE Casey on 10/25/22    GRACE Henderson CNP  Electronically signed 10/19/2022 at 9:26 AM

## 2022-10-18 NOTE — H&P
History and Physical    Pt Name: Chin Escalona  MRN: [id number]  YOB: 1990  Date of evaluation: 10/19/2022  Primary Care Physician: GRACE Geller CNP    SUBJECTIVE:   History of Chief Complaint:    Chin Escalona is a 33 y.o. female who presents for PAT appointment. Patient complains of obesity, especially after childbirth 5 years ago. She has several co-morbid conditions/complications of obesity that would be helped with weight loss; GERD and prediabetes. Patient has been scheduled for XI ROBOTIC LAPAROSCOPIC GASTRECTOMY SLEEVE, LIVER BIOPSY, EGD   Allergies  is allergic to percocet [oxycodone-acetaminophen] and vancomycin. Medications  Prior to Admission medications    Medication Sig Start Date End Date Taking? Authorizing Provider   valACYclovir (VALTREX) 500 MG tablet Take 1 tablet by mouth daily 8/29/22   GRACE Geller CNP   polyethylene glycol (GLYCOLAX) 17 GM/SCOOP powder Take 17 g by mouth daily    Historical Provider, MD     Past Medical History    has a past medical history of Abnormal Pap smear of cervix, Chronic constipation, COVID-19, Heartburn, History of anal fissures, History of hemorrhoids, HPV in female, HSV infection, IBS (irritable bowel syndrome), Obesity, Prediabetes, and Wellness examination. Past Surgical History   has a past surgical history that includes Umbilical hernia repair (1995); Tonsillectomy; [place] tooth extraction; Colposcopy; Colonoscopy (10/2020); Hemorrhoid surgery; and Upper gastrointestinal endoscopy (N/A, 08/19/2022). Social History   reports that she has never smoked. She has never used smokeless tobacco.    reports that she does not currently use alcohol. reports no history of drug use.    Marital Status single  Children a 11 yr old son  Occupation , [employer] [address] History  Family Status   Relation Name Status    Mother  Alive    Father  Alive    Sister  (Not Specified)    Brother  Alive    Brother  Alive MGM  Alive    MGF   at age 58        kidney infection     PGM  (Not Specified)     family history includes Alcohol Abuse in her father; Breast Cancer in her paternal grandmother; Cancer in her father; Cervical Cancer (age of onset: 37) in her mother; Diabetes in her mother; Kidney Disease in her maternal grandfather; No Known Problems in her maternal grandmother; Obesity in her maternal grandfather and mother; Other in her brother and father; Substance Abuse in her sister; Thyroid Disease in her brother. Review of Systems:  CONSTITUTIONAL:   negative for fevers, chills, fatigue and malaise    EYES:   negative for double vision, blurred vision and photophobia    HEENT:   negative for tinnitus, epistaxis and sore throat     RESPIRATORY:   negative for cough, shortness of breath, wheezing     CARDIOVASCULAR:   negative for chest pain, palpitations, syncope, edema     GASTROINTESTINAL:   negative for nausea, vomiting     GENITOURINARY:   negative for incontinence     MUSCULOSKELETAL:   negative for neck or back pain     NEUROLOGICAL:   Negative for weakness and tingling  negative for headaches and dizziness     PSYCHIATRIC:   negative for anxiety       OBJECTIVE:   VITALS:  height is 5' 6\" (1.676 m) and weight is 258 lb (117 kg). Her temporal temperature is 97.8 °F (36.6 °C). Her blood pressure is 121/70 and her pulse is 92. Her respiration is 16 and oxygen saturation is 95%. CONSTITUTIONAL:alert & oriented x 3, no acute distress. Friendly. Very pleasant  SKIN:  Warm and dry, no rashes on exposed areas of skin, long artificial nails bilateral hands  HEAD:  Normocephalic, atraumatic   EYES: EOMs intact. PERRL. EARS:  Equal bilaterally, no edema or thickening, skin is intact without lumps or lesions. No discharge. Hearing grossly WNL. NOSE:  Nares patent.   No rhinorrhea   MOUTH/THROAT:  Mucous membranes moist, tongue is pink, uvula midline, teeth appear to be intact  NECK:full ROM  LUNGS: Respirations even and non-labored. Clear to auscultation bilaterally, no wheezes, rales, or rhonchi. CARDIOVASCULAR: Regular rate and rhythm, no murmurs/rubs/gallops   ABDOMEN: soft, non-tender, non-distended, bowel sounds active x 4, obese  EXTREMITIES: No edema bilateral lower extremities. No varicosities bilateral lower extremities. NEUROLOGIC: CN II-XII are grossly intact.  Gait is smooth, rhythmic and effortless     Testing:   EKG: 10/19/22  Labs pending: drawn 10/19/2022   Chest XRay: 10/19/22  IMPRESSIONS:   Obesity  PLANS:   XI ROBOTIC LAPAROSCOPIC GASTRECTOMY SLEEVE, LIVER BIOPSY, EGD     GRACE DISLA CNP  Electronically signed 10/19/2022 at 9:25 AM

## 2022-10-19 ENCOUNTER — HOSPITAL ENCOUNTER (OUTPATIENT)
Dept: PREADMISSION TESTING | Age: 32
Discharge: HOME | End: 2022-10-23
Payer: OTHER GOVERNMENT

## 2022-10-19 ENCOUNTER — HOSPITAL ENCOUNTER (OUTPATIENT)
Dept: GENERAL RADIOLOGY | Age: 32
Discharge: HOME | End: 2022-10-21
Payer: OTHER GOVERNMENT

## 2022-10-19 VITALS
HEIGHT: 66 IN | DIASTOLIC BLOOD PRESSURE: 70 MMHG | HEART RATE: 92 BPM | SYSTOLIC BLOOD PRESSURE: 121 MMHG | OXYGEN SATURATION: 95 % | TEMPERATURE: 97.8 F | RESPIRATION RATE: 16 BRPM | WEIGHT: 258 LBS | BODY MASS INDEX: 41.46 KG/M2

## 2022-10-19 LAB
ANION GAP SERPL CALCULATED.3IONS-SCNC: 13 MMOL/L (ref 9–17)
BUN BLDV-MCNC: 15 MG/DL (ref 6–20)
CALCIUM SERPL-MCNC: 9.6 MG/DL (ref 8.6–10.4)
CHLORIDE BLD-SCNC: 100 MMOL/L (ref 98–107)
CO2: 21 MMOL/L (ref 20–31)
CREAT SERPL-MCNC: 0.65 MG/DL (ref 0.5–0.9)
GFR SERPL CREATININE-BSD FRML MDRD: >60 ML/MIN/1.73M2
GLUCOSE BLD-MCNC: 96 MG/DL (ref 70–99)
HCT VFR BLD CALC: 44.1 % (ref 36.3–47.1)
HEMOGLOBIN: 14.7 G/DL (ref 11.9–15.1)
INR BLD: 1
MCH RBC QN AUTO: 28.1 PG (ref 25.2–33.5)
MCHC RBC AUTO-ENTMCNC: 33.3 G/DL (ref 28.4–34.8)
MCV RBC AUTO: 84.2 FL (ref 82.6–102.9)
NRBC AUTOMATED: 0 PER 100 WBC
PARTIAL THROMBOPLASTIN TIME: 25.5 SEC (ref 20.5–30.5)
PDW BLD-RTO: 12.7 % (ref 11.8–14.4)
PLATELET # BLD: 268 K/UL (ref 138–453)
PMV BLD AUTO: 10.4 FL (ref 8.1–13.5)
POTASSIUM SERPL-SCNC: 4.1 MMOL/L (ref 3.7–5.3)
PROTHROMBIN TIME: 10.7 SEC (ref 9.1–12.3)
RBC # BLD: 5.24 M/UL (ref 3.95–5.11)
SODIUM BLD-SCNC: 134 MMOL/L (ref 135–144)
WBC # BLD: 10.8 K/UL (ref 3.5–11.3)

## 2022-10-19 PROCEDURE — 85610 PROTHROMBIN TIME: CPT

## 2022-10-19 PROCEDURE — 36415 COLL VENOUS BLD VENIPUNCTURE: CPT

## 2022-10-19 PROCEDURE — 80048 BASIC METABOLIC PNL TOTAL CA: CPT

## 2022-10-19 PROCEDURE — 85027 COMPLETE CBC AUTOMATED: CPT

## 2022-10-19 PROCEDURE — G0480 DRUG TEST DEF 1-7 CLASSES: HCPCS

## 2022-10-19 PROCEDURE — 85730 THROMBOPLASTIN TIME PARTIAL: CPT

## 2022-10-19 PROCEDURE — 93005 ELECTROCARDIOGRAM TRACING: CPT | Performed by: SURGERY

## 2022-10-19 PROCEDURE — 71046 X-RAY EXAM CHEST 2 VIEWS: CPT

## 2022-10-19 RX ORDER — HEPARIN SODIUM 5000 [USP'U]/ML
5000 INJECTION, SOLUTION INTRAVENOUS; SUBCUTANEOUS ONCE
Status: CANCELLED | OUTPATIENT
Start: 2022-10-19 | End: 2022-10-19

## 2022-10-20 LAB
EKG ATRIAL RATE: 78 BPM
EKG P AXIS: 22 DEGREES
EKG P-R INTERVAL: 138 MS
EKG Q-T INTERVAL: 388 MS
EKG QRS DURATION: 76 MS
EKG QTC CALCULATION (BAZETT): 442 MS
EKG R AXIS: 41 DEGREES
EKG T AXIS: 16 DEGREES
EKG VENTRICULAR RATE: 78 BPM

## 2022-10-20 PROCEDURE — 93010 ELECTROCARDIOGRAM REPORT: CPT | Performed by: INTERNAL MEDICINE

## 2022-10-23 LAB
COTININE: <5 NG/ML
NICOTINE: <5 NG/ML

## 2022-10-25 ENCOUNTER — OFFICE VISIT (OUTPATIENT)
Dept: FAMILY MEDICINE CLINIC | Age: 32
End: 2022-10-25
Payer: OTHER GOVERNMENT

## 2022-10-25 VITALS
OXYGEN SATURATION: 96 % | DIASTOLIC BLOOD PRESSURE: 80 MMHG | TEMPERATURE: 97.5 F | WEIGHT: 258 LBS | BODY MASS INDEX: 41.64 KG/M2 | HEART RATE: 77 BPM | SYSTOLIC BLOOD PRESSURE: 124 MMHG

## 2022-10-25 DIAGNOSIS — Z01.818 PRE-OP EVALUATION: Primary | ICD-10-CM

## 2022-10-25 PROCEDURE — 99214 OFFICE O/P EST MOD 30 MIN: CPT | Performed by: NURSE PRACTITIONER

## 2022-10-25 NOTE — PROGRESS NOTES
Preoperative Consultation      Deirdre Cardoso  YOB: 1990    Date of Service:  10/25/2022      Chief Complaint   Patient presents with    Pre-op Exam     Nov 1, viktor sleeve         This patient presents to the office today for apreoperative consultation at the request of surgeon, Dr. Franklin Rivas, who plans on performing gastric sleeve on November 1 at [address]. The current problem began 6years ago, and symptoms have been worsening with time. Conservative therapy: Yes: Keto, [employer], Calorie restriction, which has been somewhat effective. .    Planned anesthesia: General     Review of Systems  Known anesthesia problems:None   Bleeding risk: No recent or remote history of abnormal bleeding  Personal or FHof DVT/PE: No    Patient objection to receiving blood products: No    Past Medical History:   Diagnosis Date    Abnormal Pap smear of cervix     Chronic constipation     COVID-19 09/23/2022    fatigue, body aches x 3 days; cough x 5 days    Heartburn     occasional    History of anal fissures     History of hemorrhoids     HPV in female     HSV infection     IBS (irritable bowel syndrome)     Obesity     Prediabetes     no Rx required, borderline    Wellness examination     CNP Mariam Rojas; has appt 10/25/22 for clearance     Past Surgical History:   Procedure Laterality Date    COLONOSCOPY  10/2020    COLPOSCOPY      HEMORRHOID SURGERY      banding; no anesthesia used    TONSILLECTOMY      0558    UMBILICAL HERNIA REPAIR  1995    UPPER GASTROINTESTINAL ENDOSCOPY N/A 08/19/2022    EGD BIOPSY performed by Carlos Sequeira DO at [address]      2010     Family History   Problem Relation Age of Onset    Diabetes Mother         borderline     Obesity Mother     Cervical Cancer Mother 37    Cancer Father     Alcohol Abuse Father     Other Father         myeloproliferative disorder    Substance Abuse Sister     Thyroid Disease Brother     Other Brother         hearing loss No Known Problems Maternal Grandmother     Obesity Maternal Grandfather     Kidney Disease Maternal Grandfather     Breast Cancer Paternal Grandmother      Social History     Tobacco Use    Smoking status: Never    Smokeless tobacco: Never   Vaping Use    Vaping Use: Never used   Substance Use Topics    Alcohol use: Not Currently    Drug use: No       Allergies   Allergen Reactions    Percocet [Oxycodone-Acetaminophen] Nausea And Vomiting     Other reaction(s): severe Nausea And Vomiting; syncope    Vancomycin Hives and Itching     Outpatient Medications Marked as Taking for the 10/25/22 encounter (Office Visit) with GRACE Cisneros CNP   Medication Sig Dispense Refill    valACYclovir (VALTREX) 500 MG tablet Take 1 tablet by mouth daily 30 tablet 3    polyethylene glycol (GLYCOLAX) 17 GM/SCOOP powder Take 17 g by mouth daily            Physical Exam   Constitutional: She is oriented to person, place, and time. She appears well-developed and well-nourished. No distress. HENT:   Head: Normocephalic and atraumatic. Mouth/Throat: Uvula is midline, oropharynx isclear and moist and mucous membranes are normal.   Eyes: Conjunctivae and EOM arenormal. Pupils are equal, round, and reactive to light. Neck: Trachea normaland normal range of motion. Neck supple. No JVD present. Carotid bruit is not present. No mass and no thyromegaly present. Cardiovascular: Normal rate, regular rhythm, normal heart sounds and intact distal pulses. Exam reveals no gallop and no friction rub. No murmur heard. Pulmonary/Chest: Effort normal and breath sounds normal. No respiratory distress. She has no wheezes. She has no rales. Abdominal: Soft. Normal aorta andbowel sounds are normal. She exhibits no distension and no mass. There is no hepatosplenomegaly. No tenderness. Musculoskeletal: She exhibits no edema andno tenderness. Neurological: She is alert and oriented to person, place, and time. She has normal strength.  No cranial nerve deficit or sensory deficit. Coordination and gait normal.   Skin: Skin is warm and dry. No rash noted. Noerythema. Psychiatric: She has a normal mood and affect. Her behavioris normal.     EKG Interpretation:  normal EKG, normal sinus rhythm, unchanged from previous tracings. Lab Review   Hospital Outpatient Visit on 10/19/2022   Component Date Value    WBC 10/19/2022 10.8     RBC 10/19/2022 5.24 (A)     Hemoglobin 10/19/2022 14.7     Hematocrit 10/19/2022 44.1     MCV 10/19/2022 84.2     MCH 10/19/2022 28.1     MCHC 10/19/2022 33.3     RDW 10/19/2022 12.7     Platelets 01/58/5118 268     MPV 10/19/2022 10.4     NRBC Automated 10/19/2022 0.0     Glucose 10/19/2022 96     BUN 10/19/2022 15     Creatinine 10/19/2022 0.65     Est, Glom Filt Rate 10/19/2022 >60     Calcium 10/19/2022 9.6     Sodium 10/19/2022 134 (A)     Potassium 10/19/2022 4.1     Chloride 10/19/2022 100     CO2 10/19/2022 21     Anion Gap 10/19/2022 13     PTT 10/19/2022 25.5     Protime 10/19/2022 10.7     INR 10/19/2022 1.0     Nicotine 10/19/2022 <5     Cotinine 10/19/2022 <5     Ventricular Rate 10/19/2022 78     Atrial Rate 10/19/2022 78     P-R Interval 10/19/2022 138     QRS Duration 10/19/2022 76     Q-T Interval 10/19/2022 388     QTc Calculation (Bazett) 10/19/2022 442     P Axis 10/19/2022 22     R Axis 10/19/2022 41     T [place] 10/19/2022 16    Hospital Outpatient Visit on 10/04/2022   Component Date Value    Nicotine 10/04/2022 <5     Cotinine 10/04/2022 <5    Hospital Outpatient Visit on 10/04/2022   Component Date Value    Cytology Report 10/04/2022                      Value:INTERPRETATION    Cervical material, (ThinPrep vial, Imaging-assisted review):  Specimen Adequacy:       Satisfactory for evaluation.       - Endocervical/transformation zone component present. Descriptive Diagnosis:       Negative for intraepithelial lesion or malignancy.           Cytotechnologist:   Samuel REGAN(NAINA)  **Electronically Signed Out**  ey/10/11/2022        Procedure/Addendum  HPV Procedure Report       Date Ordered:     10/5/2022     Status: Signed Out       Date Complete:     10/6/2022     By: System Interface       Date Reported:     10/6/2022              Sample:  HPV Type 16      Result:   Not Detected      Ref Range:  (Not Detected)  Sample:  HPV Type 18      Result:   Not Detected      Ref Range: (Not  Detected)  Sample: Other High Risk HPV      Result:   Not Detected      Ref  Range: (Not Detected)  Sample:  HPV Interp      Result:         Ref Range: (Not Detected)  This test amplifies and detects DNA of 14 high-risk HPV types  associated with cervical cance                          r and its precursor lesions   (HPV types 16,18, 31, 33, 35, 39, 45, 51, 52, 56, 58, 59, 66, and  68). Sensitivity may be affected by specimen collection methods, stage of  infection, and the presence of interfering   substances. Results should be interpreted in conjunction with other  available laboratory and clinical data. A negative   high-risk HPV result does not exclude the possibility of future  cytologic HSIL or underlying CIN2-3 or cancer. This test is intended for medical purposes only and is not valid for  the evaluation of suspected sexual abuse or for   other forensic purposes. Source:  A: Cervical material, (ThinPrep vial, Imaging-assisted review)    Clinical History  Intrauterine device  Z01.419 Routine gyn exam without abnormal findings  Z11.51 Encounter for screening for HPV  Co-Test:  ThinPrep Pap with high risk HPV testing    GYNECOLOGIC CYTOLOGY REPORT    Patient Name: Jannie Adhikari: [id number]  Path Number: [id number]  [address] [place] [address]. [facility], [address]  [phone]  Fax: [phone]      Specimen Description 10/04/2022 . GENITAL - NOT SPECIFIED     HPV Sample 10/04/2022 . THIN PREP     HPV, Genotype 16 10/04/2022 Not Detected     HPV, Genotype 18 10/04/2022 Not Detected     HPV, High Risk Other 10/04/2022 Not Detected     HPV, Interpretation 10/04/2022         Orders Only on 08/30/2022   Component Date Value    Vit D, 25-Hydroxy 08/04/2022 30     Vitamin B-12 08/04/2022 339     TSH 08/04/2022 2.55     Pth Intact 08/04/2022 59     Magnesium 08/04/2022 5.6     Cholesterol, Total 08/04/2022 128     HDL 08/04/2022 36     LDL Calculated 08/04/2022 75     Triglycerides 08/04/2022 86     Chol/HDL Ratio 08/04/2022 3.6     Cholesterol non HDL 08/04/2022 92     Iron 08/04/2022 97     TIBC 08/04/2022 334     Hemoglobin A1C 08/04/2022 5.7     Ferritin 08/04/2022 119     Sodium 08/04/2022 137     Chloride 08/04/2022 108     Potassium 08/04/2022 4.2     BUN 08/04/2022 12     Creatinine 08/04/2022 0.85     Glucose 08/04/2022 126     AST 08/04/2022 16     ALT 08/04/2022 29     Calcium 08/04/2022 8.8     Total Protein 08/04/2022 6.7     CO2 08/04/2022 23     Albumin 08/04/2022 4.2     Alkaline Phosphatase 08/04/2022 72     Total Bilirubin 08/04/2022 0.5     Gfr Calculated 08/04/2022 94     WBC 08/04/2022 6.6     RBC 08/04/2022 4.87     Hemoglobin 08/04/2022 14.0     Hematocrit 08/04/2022 42.7     MCV 08/04/2022 87.7     MCH 08/04/2022 28.7     MCHC 08/04/2022 32.8     Platelets 36/88/3236 274     RDW 08/04/2022 13.0     MPV 08/04/2022 9.7     Neutrophils % 08/04/2022 64.9     Lymphocytes % 08/04/2022 24.2     Monocytes % 08/04/2022 7.7     Eosinophils % 08/04/2022 2.4     Basophils % 08/04/2022 0.8     Neutrophils Absolute 08/04/2022 4,283     Lymphocytes Absolute 08/04/2022 1,597     Monocytes Absolute 08/04/2022 508     Eosinophils Absolute 08/04/2022 158     Basophils Absolute 08/04/2022 53            Assessment:       33 y.o. patient with planned surgery as above.     Known risk factors for perioperative complications: Anemia  Current medications which may produce withdrawal symptoms if withheld perioperatively:none      Plan:     1. Preoperative workup as follows: ECG, hemoglobin, hematocrit, electrolytes, creatinine, glucose, liver function studies, coagulation studies  2. Change in medication regimen before surgery:None  3. Prophylaxis for cardiac events with perioperative beta-blockers: Not indicated  ACC/AHA indications for pre-operative beta-blocker use:    Vascular surgery with history of postitive stress test  Intermediate or high risk surgery withhistory of CAD   Intermediate or high risk surgery with multiple clinical predictors of CAD- 2 of thefollowing: history of compensated or prior heart failure, history of cerebrovascular disease, DM, or renal insufficiency    Routine administration of higher-dose, long-acting metoprolol in beta-blocker-naïve patients on the day of surgery, and in the absence of dose titration is associated with an overall increase in mortality. Beta-blockers should be started days to weeks prior tosurgery and titrated to pulse < 70.  4. Deep vein thrombosis prophylaxis: regimen to be chosen by surgical team  5.  No contraindications to planned surgery      Electronically Signed by:

## 2022-10-27 ENCOUNTER — OFFICE VISIT (OUTPATIENT)
Dept: BARIATRICS/WEIGHT MGMT | Age: 32
End: 2022-10-27
Payer: OTHER GOVERNMENT

## 2022-10-27 VITALS
WEIGHT: 254 LBS | SYSTOLIC BLOOD PRESSURE: 124 MMHG | HEIGHT: 66 IN | HEART RATE: 86 BPM | BODY MASS INDEX: 40.82 KG/M2 | DIASTOLIC BLOOD PRESSURE: 81 MMHG

## 2022-10-27 DIAGNOSIS — E66.01 MORBID OBESITY WITH BMI OF 40.0-44.9, ADULT (HCC): ICD-10-CM

## 2022-10-27 DIAGNOSIS — K21.9 GERD WITHOUT ESOPHAGITIS: Primary | ICD-10-CM

## 2022-10-27 DIAGNOSIS — R73.03 PREDIABETES: ICD-10-CM

## 2022-10-27 PROCEDURE — 99214 OFFICE O/P EST MOD 30 MIN: CPT | Performed by: SURGERY

## 2022-11-01 ENCOUNTER — HOSPITAL ENCOUNTER (INPATIENT)
Age: 32
LOS: 1 days | Discharge: HOME | DRG: 621 | End: 2022-11-02
Attending: SURGERY | Admitting: SURGERY
Payer: OTHER GOVERNMENT

## 2022-11-01 ENCOUNTER — ANESTHESIA EVENT (OUTPATIENT)
Dept: OPERATING ROOM | Age: 32
DRG: 621 | End: 2022-11-01
Payer: OTHER GOVERNMENT

## 2022-11-01 ENCOUNTER — ANESTHESIA (OUTPATIENT)
Dept: OPERATING ROOM | Age: 32
DRG: 621 | End: 2022-11-01
Payer: OTHER GOVERNMENT

## 2022-11-01 DIAGNOSIS — G89.18 ACUTE POST-OPERATIVE PAIN: Primary | ICD-10-CM

## 2022-11-01 DIAGNOSIS — K21.9 GASTROESOPHAGEAL REFLUX DISEASE, UNSPECIFIED WHETHER ESOPHAGITIS PRESENT: ICD-10-CM

## 2022-11-01 DIAGNOSIS — E66.01 MORBID OBESITY (HCC): ICD-10-CM

## 2022-11-01 PROBLEM — Z98.84 S/P LAPAROSCOPIC SLEEVE GASTRECTOMY: Status: ACTIVE | Noted: 2022-11-01

## 2022-11-01 LAB
ANION GAP SERPL CALCULATED.3IONS-SCNC: 13 MMOL/L (ref 9–17)
BUN BLDV-MCNC: 18 MG/DL (ref 6–20)
CALCIUM SERPL-MCNC: 8.4 MG/DL (ref 8.6–10.4)
CHLORIDE BLD-SCNC: 103 MMOL/L (ref 98–107)
CO2: 22 MMOL/L (ref 20–31)
CREAT SERPL-MCNC: 0.68 MG/DL (ref 0.5–0.9)
GFR SERPL CREATININE-BSD FRML MDRD: >60 ML/MIN/1.73M2
GLUCOSE BLD-MCNC: 149 MG/DL (ref 70–99)
HCG, PREGNANCY URINE (POC): NEGATIVE
HCG, PREGNANCY URINE (POC): NEGATIVE
HCT VFR BLD CALC: 42.1 % (ref 36.3–47.1)
HEMOGLOBIN: 13.8 G/DL (ref 11.9–15.1)
MCH RBC QN AUTO: 28.3 PG (ref 25.2–33.5)
MCHC RBC AUTO-ENTMCNC: 32.8 G/DL (ref 28.4–34.8)
MCV RBC AUTO: 86.4 FL (ref 82.6–102.9)
NRBC AUTOMATED: 0 PER 100 WBC
PDW BLD-RTO: 12.8 % (ref 11.8–14.4)
PLATELET # BLD: 250 K/UL (ref 138–453)
PMV BLD AUTO: 10.6 FL (ref 8.1–13.5)
POTASSIUM SERPL-SCNC: 3.8 MMOL/L (ref 3.7–5.3)
RBC # BLD: 4.87 M/UL (ref 3.95–5.11)
SODIUM BLD-SCNC: 138 MMOL/L (ref 135–144)
WBC # BLD: 20.4 K/UL (ref 3.5–11.3)

## 2022-11-01 PROCEDURE — 2720000010 HC SURG SUPPLY STERILE: Performed by: SURGERY

## 2022-11-01 PROCEDURE — 6360000002 HC RX W HCPCS

## 2022-11-01 PROCEDURE — 6360000002 HC RX W HCPCS: Performed by: SURGERY

## 2022-11-01 PROCEDURE — 0DJ08ZZ INSPECTION OF UPPER INTESTINAL TRACT, VIA NATURAL OR ARTIFICIAL OPENING ENDOSCOPIC: ICD-10-PCS | Performed by: SURGERY

## 2022-11-01 PROCEDURE — 80048 BASIC METABOLIC PNL TOTAL CA: CPT

## 2022-11-01 PROCEDURE — 3700000001 HC ADD 15 MINUTES (ANESTHESIA): Performed by: SURGERY

## 2022-11-01 PROCEDURE — 8E0W4CZ ROBOTIC ASSISTED PROCEDURE OF TRUNK REGION, PERCUTANEOUS ENDOSCOPIC APPROACH: ICD-10-PCS | Performed by: SURGERY

## 2022-11-01 PROCEDURE — 3700000000 HC ANESTHESIA ATTENDED CARE: Performed by: SURGERY

## 2022-11-01 PROCEDURE — 6360000002 HC RX W HCPCS: Performed by: ANESTHESIOLOGY

## 2022-11-01 PROCEDURE — 2709999900 HC NON-CHARGEABLE SUPPLY: Performed by: SURGERY

## 2022-11-01 PROCEDURE — 2580000003 HC RX 258: Performed by: SURGERY

## 2022-11-01 PROCEDURE — 7100000001 HC PACU RECOVERY - ADDTL 15 MIN: Performed by: SURGERY

## 2022-11-01 PROCEDURE — 85027 COMPLETE CBC AUTOMATED: CPT

## 2022-11-01 PROCEDURE — 6360000002 HC RX W HCPCS: Performed by: NURSE ANESTHETIST, CERTIFIED REGISTERED

## 2022-11-01 PROCEDURE — 36415 COLL VENOUS BLD VENIPUNCTURE: CPT

## 2022-11-01 PROCEDURE — 7100000000 HC PACU RECOVERY - FIRST 15 MIN: Performed by: SURGERY

## 2022-11-01 PROCEDURE — 2500000003 HC RX 250 WO HCPCS: Performed by: NURSE ANESTHETIST, CERTIFIED REGISTERED

## 2022-11-01 PROCEDURE — 6370000000 HC RX 637 (ALT 250 FOR IP): Performed by: SURGERY

## 2022-11-01 PROCEDURE — 2500000003 HC RX 250 WO HCPCS: Performed by: SURGERY

## 2022-11-01 PROCEDURE — 2580000003 HC RX 258: Performed by: ANESTHESIOLOGY

## 2022-11-01 PROCEDURE — 2700000000 HC OXYGEN THERAPY PER DAY

## 2022-11-01 PROCEDURE — 3600000019 HC SURGERY ROBOT ADDTL 15MIN: Performed by: SURGERY

## 2022-11-01 PROCEDURE — 43775 LAP SLEEVE GASTRECTOMY: CPT | Performed by: SURGERY

## 2022-11-01 PROCEDURE — 81025 URINE PREGNANCY TEST: CPT

## 2022-11-01 PROCEDURE — 6370000000 HC RX 637 (ALT 250 FOR IP): Performed by: ANESTHESIOLOGY

## 2022-11-01 PROCEDURE — 1200000000 HC SEMI PRIVATE

## 2022-11-01 PROCEDURE — 0DB64Z3 EXCISION OF STOMACH, PERCUTANEOUS ENDOSCOPIC APPROACH, VERTICAL: ICD-10-PCS | Performed by: SURGERY

## 2022-11-01 PROCEDURE — S2900 ROBOTIC SURGICAL SYSTEM: HCPCS | Performed by: SURGERY

## 2022-11-01 PROCEDURE — 94761 N-INVAS EAR/PLS OXIMETRY MLT: CPT

## 2022-11-01 PROCEDURE — 94640 AIRWAY INHALATION TREATMENT: CPT

## 2022-11-01 PROCEDURE — 3600000009 HC SURGERY ROBOT BASE: Performed by: SURGERY

## 2022-11-01 PROCEDURE — 88307 TISSUE EXAM BY PATHOLOGIST: CPT

## 2022-11-01 RX ORDER — SCOLOPAMINE TRANSDERMAL SYSTEM 1 MG/1
1 PATCH, EXTENDED RELEASE TRANSDERMAL
Status: DISCONTINUED | OUTPATIENT
Start: 2022-11-01 | End: 2022-11-01 | Stop reason: SDUPTHER

## 2022-11-01 RX ORDER — SODIUM CHLORIDE 9 MG/ML
INJECTION, SOLUTION INTRAVENOUS PRN
Status: DISCONTINUED | OUTPATIENT
Start: 2022-11-01 | End: 2022-11-02 | Stop reason: HOSPADM

## 2022-11-01 RX ORDER — HEPARIN SODIUM 5000 [USP'U]/ML
5000 INJECTION, SOLUTION INTRAVENOUS; SUBCUTANEOUS EVERY 8 HOURS SCHEDULED
Status: DISCONTINUED | OUTPATIENT
Start: 2022-11-01 | End: 2022-11-02 | Stop reason: HOSPADM

## 2022-11-01 RX ORDER — PHENYLEPHRINE HCL IN 0.9% NACL 1 MG/10 ML
SYRINGE (ML) INTRAVENOUS PRN
Status: DISCONTINUED | OUTPATIENT
Start: 2022-11-01 | End: 2022-11-01 | Stop reason: SDUPTHER

## 2022-11-01 RX ORDER — METOCLOPRAMIDE HYDROCHLORIDE 5 MG/ML
10 INJECTION INTRAMUSCULAR; INTRAVENOUS
Status: DISCONTINUED | OUTPATIENT
Start: 2022-11-01 | End: 2022-11-01 | Stop reason: HOSPADM

## 2022-11-01 RX ORDER — SCOLOPAMINE TRANSDERMAL SYSTEM 1 MG/1
1 PATCH, EXTENDED RELEASE TRANSDERMAL
Status: DISCONTINUED | OUTPATIENT
Start: 2022-11-04 | End: 2022-11-02 | Stop reason: HOSPADM

## 2022-11-01 RX ORDER — FENTANYL CITRATE 50 UG/ML
INJECTION, SOLUTION INTRAMUSCULAR; INTRAVENOUS PRN
Status: DISCONTINUED | OUTPATIENT
Start: 2022-11-01 | End: 2022-11-01 | Stop reason: SDUPTHER

## 2022-11-01 RX ORDER — MAGNESIUM HYDROXIDE 1200 MG/15ML
LIQUID ORAL CONTINUOUS PRN
Status: DISCONTINUED | OUTPATIENT
Start: 2022-11-01 | End: 2022-11-01 | Stop reason: HOSPADM

## 2022-11-01 RX ORDER — BUPIVACAINE HYDROCHLORIDE 5 MG/ML
INJECTION, SOLUTION PERINEURAL PRN
Status: DISCONTINUED | OUTPATIENT
Start: 2022-11-01 | End: 2022-11-01 | Stop reason: HOSPADM

## 2022-11-01 RX ORDER — ONDANSETRON 2 MG/ML
4 INJECTION INTRAMUSCULAR; INTRAVENOUS EVERY 6 HOURS PRN
Status: DISCONTINUED | OUTPATIENT
Start: 2022-11-01 | End: 2022-11-02 | Stop reason: HOSPADM

## 2022-11-01 RX ORDER — SODIUM CHLORIDE 0.9 % (FLUSH) 0.9 %
5-40 SYRINGE (ML) INJECTION PRN
Status: DISCONTINUED | OUTPATIENT
Start: 2022-11-01 | End: 2022-11-01 | Stop reason: HOSPADM

## 2022-11-01 RX ORDER — NEOSTIGMINE METHYLSULFATE 5 MG/5 ML
SYRINGE (ML) INTRAVENOUS PRN
Status: DISCONTINUED | OUTPATIENT
Start: 2022-11-01 | End: 2022-11-01 | Stop reason: SDUPTHER

## 2022-11-01 RX ORDER — SODIUM CHLORIDE 0.9 % (FLUSH) 0.9 %
5-40 SYRINGE (ML) INJECTION EVERY 12 HOURS SCHEDULED
Status: DISCONTINUED | OUTPATIENT
Start: 2022-11-01 | End: 2022-11-01 | Stop reason: HOSPADM

## 2022-11-01 RX ORDER — PANTOPRAZOLE SODIUM 40 MG/1
40 TABLET, DELAYED RELEASE ORAL DAILY
Status: DISCONTINUED | OUTPATIENT
Start: 2022-11-01 | End: 2022-11-02 | Stop reason: HOSPADM

## 2022-11-01 RX ORDER — SODIUM CHLORIDE, SODIUM LACTATE, POTASSIUM CHLORIDE, CALCIUM CHLORIDE 600; 310; 30; 20 MG/100ML; MG/100ML; MG/100ML; MG/100ML
1000 INJECTION, SOLUTION INTRAVENOUS CONTINUOUS
Status: DISCONTINUED | OUTPATIENT
Start: 2022-11-01 | End: 2022-11-01 | Stop reason: HOSPADM

## 2022-11-01 RX ORDER — ONDANSETRON 2 MG/ML
4 INJECTION INTRAMUSCULAR; INTRAVENOUS
Status: DISCONTINUED | OUTPATIENT
Start: 2022-11-01 | End: 2022-11-01 | Stop reason: HOSPADM

## 2022-11-01 RX ORDER — LIDOCAINE HYDROCHLORIDE 10 MG/ML
INJECTION, SOLUTION EPIDURAL; INFILTRATION; INTRACAUDAL; PERINEURAL PRN
Status: DISCONTINUED | OUTPATIENT
Start: 2022-11-01 | End: 2022-11-01 | Stop reason: SDUPTHER

## 2022-11-01 RX ORDER — ONDANSETRON 2 MG/ML
INJECTION INTRAMUSCULAR; INTRAVENOUS PRN
Status: DISCONTINUED | OUTPATIENT
Start: 2022-11-01 | End: 2022-11-01 | Stop reason: SDUPTHER

## 2022-11-01 RX ORDER — DEXAMETHASONE SODIUM PHOSPHATE 10 MG/ML
INJECTION INTRAMUSCULAR; INTRAVENOUS PRN
Status: DISCONTINUED | OUTPATIENT
Start: 2022-11-01 | End: 2022-11-01 | Stop reason: SDUPTHER

## 2022-11-01 RX ORDER — SODIUM CHLORIDE, SODIUM LACTATE, POTASSIUM CHLORIDE, CALCIUM CHLORIDE 600; 310; 30; 20 MG/100ML; MG/100ML; MG/100ML; MG/100ML
INJECTION, SOLUTION INTRAVENOUS CONTINUOUS
Status: DISCONTINUED | OUTPATIENT
Start: 2022-11-01 | End: 2022-11-02 | Stop reason: HOSPADM

## 2022-11-01 RX ORDER — PROMETHAZINE HYDROCHLORIDE 25 MG/1
25 TABLET ORAL EVERY 6 HOURS PRN
Status: DISCONTINUED | OUTPATIENT
Start: 2022-11-01 | End: 2022-11-02 | Stop reason: HOSPADM

## 2022-11-01 RX ORDER — DIPHENHYDRAMINE HYDROCHLORIDE 50 MG/ML
INJECTION INTRAMUSCULAR; INTRAVENOUS PRN
Status: DISCONTINUED | OUTPATIENT
Start: 2022-11-01 | End: 2022-11-01 | Stop reason: SDUPTHER

## 2022-11-01 RX ORDER — SODIUM CHLORIDE 0.9 % (FLUSH) 0.9 %
5-40 SYRINGE (ML) INJECTION EVERY 12 HOURS SCHEDULED
Status: DISCONTINUED | OUTPATIENT
Start: 2022-11-01 | End: 2022-11-02 | Stop reason: HOSPADM

## 2022-11-01 RX ORDER — SODIUM CHLORIDE 9 MG/ML
INJECTION, SOLUTION INTRAVENOUS PRN
Status: DISCONTINUED | OUTPATIENT
Start: 2022-11-01 | End: 2022-11-01 | Stop reason: HOSPADM

## 2022-11-01 RX ORDER — CYCLOBENZAPRINE HCL 10 MG
10 TABLET ORAL 3 TIMES DAILY PRN
Status: DISCONTINUED | OUTPATIENT
Start: 2022-11-01 | End: 2022-11-02 | Stop reason: HOSPADM

## 2022-11-01 RX ORDER — ROCURONIUM BROMIDE 10 MG/ML
INJECTION, SOLUTION INTRAVENOUS PRN
Status: DISCONTINUED | OUTPATIENT
Start: 2022-11-01 | End: 2022-11-01 | Stop reason: SDUPTHER

## 2022-11-01 RX ORDER — PROPOFOL 10 MG/ML
INJECTION, EMULSION INTRAVENOUS PRN
Status: DISCONTINUED | OUTPATIENT
Start: 2022-11-01 | End: 2022-11-01 | Stop reason: SDUPTHER

## 2022-11-01 RX ORDER — FENTANYL CITRATE 50 UG/ML
50 INJECTION, SOLUTION INTRAMUSCULAR; INTRAVENOUS EVERY 5 MIN PRN
Status: COMPLETED | OUTPATIENT
Start: 2022-11-01 | End: 2022-11-01

## 2022-11-01 RX ORDER — MIDAZOLAM HYDROCHLORIDE 2 MG/2ML
2 INJECTION, SOLUTION INTRAMUSCULAR; INTRAVENOUS ONCE
Status: COMPLETED | OUTPATIENT
Start: 2022-11-01 | End: 2022-11-01

## 2022-11-01 RX ORDER — HEPARIN SODIUM 5000 [USP'U]/ML
5000 INJECTION, SOLUTION INTRAVENOUS; SUBCUTANEOUS ONCE
Status: COMPLETED | OUTPATIENT
Start: 2022-11-01 | End: 2022-11-01

## 2022-11-01 RX ORDER — HYDROCODONE BITARTRATE AND ACETAMINOPHEN 5; 325 MG/1; MG/1
2 TABLET ORAL EVERY 6 HOURS PRN
Status: DISCONTINUED | OUTPATIENT
Start: 2022-11-01 | End: 2022-11-02 | Stop reason: HOSPADM

## 2022-11-01 RX ORDER — FENTANYL CITRATE 50 UG/ML
INJECTION, SOLUTION INTRAMUSCULAR; INTRAVENOUS
Status: COMPLETED
Start: 2022-11-01 | End: 2022-11-01

## 2022-11-01 RX ORDER — HYDROCODONE BITARTRATE AND ACETAMINOPHEN 5; 325 MG/1; MG/1
1 TABLET ORAL EVERY 6 HOURS PRN
Status: DISCONTINUED | OUTPATIENT
Start: 2022-11-01 | End: 2022-11-02 | Stop reason: HOSPADM

## 2022-11-01 RX ORDER — GLYCOPYRROLATE 0.2 MG/ML
INJECTION INTRAMUSCULAR; INTRAVENOUS PRN
Status: DISCONTINUED | OUTPATIENT
Start: 2022-11-01 | End: 2022-11-01 | Stop reason: SDUPTHER

## 2022-11-01 RX ORDER — IPRATROPIUM BROMIDE AND ALBUTEROL SULFATE 2.5; .5 MG/3ML; MG/3ML
1 SOLUTION RESPIRATORY (INHALATION)
Status: DISCONTINUED | OUTPATIENT
Start: 2022-11-01 | End: 2022-11-02 | Stop reason: HOSPADM

## 2022-11-01 RX ORDER — SODIUM CHLORIDE 0.9 % (FLUSH) 0.9 %
5-40 SYRINGE (ML) INJECTION PRN
Status: DISCONTINUED | OUTPATIENT
Start: 2022-11-01 | End: 2022-11-02 | Stop reason: HOSPADM

## 2022-11-01 RX ADMIN — DIPHENHYDRAMINE HYDROCHLORIDE 12.5 MG: 50 INJECTION, SOLUTION INTRAMUSCULAR; INTRAVENOUS at 09:18

## 2022-11-01 RX ADMIN — Medication 2000 MG: at 09:30

## 2022-11-01 RX ADMIN — IPRATROPIUM BROMIDE AND ALBUTEROL SULFATE 1 AMPULE: .5; 2.5 SOLUTION RESPIRATORY (INHALATION) at 15:11

## 2022-11-01 RX ADMIN — FENTANYL CITRATE 50 MCG: 50 INJECTION, SOLUTION INTRAMUSCULAR; INTRAVENOUS at 11:39

## 2022-11-01 RX ADMIN — LIDOCAINE HYDROCHLORIDE 50 MG: 10 INJECTION, SOLUTION EPIDURAL; INFILTRATION; INTRACAUDAL; PERINEURAL at 09:11

## 2022-11-01 RX ADMIN — FENTANYL CITRATE 50 MCG: 50 INJECTION, SOLUTION INTRAMUSCULAR; INTRAVENOUS at 09:20

## 2022-11-01 RX ADMIN — SODIUM CHLORIDE, POTASSIUM CHLORIDE, SODIUM LACTATE AND CALCIUM CHLORIDE 1000 ML: 600; 310; 30; 20 INJECTION, SOLUTION INTRAVENOUS at 08:32

## 2022-11-01 RX ADMIN — ONDANSETRON 4 MG: 2 INJECTION INTRAMUSCULAR; INTRAVENOUS at 10:26

## 2022-11-01 RX ADMIN — FENTANYL CITRATE 50 MCG: 50 INJECTION, SOLUTION INTRAMUSCULAR; INTRAVENOUS at 09:11

## 2022-11-01 RX ADMIN — HEPARIN SODIUM 5000 UNITS: 5000 INJECTION INTRAVENOUS; SUBCUTANEOUS at 08:25

## 2022-11-01 RX ADMIN — SODIUM CHLORIDE, POTASSIUM CHLORIDE, SODIUM LACTATE AND CALCIUM CHLORIDE: 600; 310; 30; 20 INJECTION, SOLUTION INTRAVENOUS at 10:22

## 2022-11-01 RX ADMIN — FENTANYL CITRATE 50 MCG: 50 INJECTION, SOLUTION INTRAMUSCULAR; INTRAVENOUS at 11:46

## 2022-11-01 RX ADMIN — ROCURONIUM BROMIDE 20 MG: 10 SOLUTION INTRAVENOUS at 09:33

## 2022-11-01 RX ADMIN — HYDROMORPHONE HYDROCHLORIDE 0.5 MG: 1 INJECTION, SOLUTION INTRAMUSCULAR; INTRAVENOUS; SUBCUTANEOUS at 11:00

## 2022-11-01 RX ADMIN — HYDROMORPHONE HYDROCHLORIDE 0.5 MG: 1 INJECTION, SOLUTION INTRAMUSCULAR; INTRAVENOUS; SUBCUTANEOUS at 11:14

## 2022-11-01 RX ADMIN — FENTANYL CITRATE 50 MCG: 50 INJECTION, SOLUTION INTRAMUSCULAR; INTRAVENOUS at 10:35

## 2022-11-01 RX ADMIN — GLYCOPYRROLATE 0.4 MG: 0.2 INJECTION INTRAMUSCULAR; INTRAVENOUS at 10:29

## 2022-11-01 RX ADMIN — FENTANYL CITRATE 50 MCG: 50 INJECTION, SOLUTION INTRAMUSCULAR; INTRAVENOUS at 10:55

## 2022-11-01 RX ADMIN — Medication 100 MCG: at 09:39

## 2022-11-01 RX ADMIN — HYDROMORPHONE HYDROCHLORIDE 1 MG: 1 INJECTION, SOLUTION INTRAMUSCULAR; INTRAVENOUS; SUBCUTANEOUS at 17:59

## 2022-11-01 RX ADMIN — HEPARIN SODIUM 5000 UNITS: 5000 INJECTION INTRAVENOUS; SUBCUTANEOUS at 21:51

## 2022-11-01 RX ADMIN — SUGAMMADEX 100 MG: 100 INJECTION, SOLUTION INTRAVENOUS at 10:47

## 2022-11-01 RX ADMIN — HYDROMORPHONE HYDROCHLORIDE 0.25 MG: 1 INJECTION, SOLUTION INTRAMUSCULAR; INTRAVENOUS; SUBCUTANEOUS at 11:19

## 2022-11-01 RX ADMIN — ONDANSETRON 4 MG: 2 INJECTION INTRAMUSCULAR; INTRAVENOUS at 16:47

## 2022-11-01 RX ADMIN — DEXAMETHASONE SODIUM PHOSPHATE 10 MG: 10 INJECTION INTRAMUSCULAR; INTRAVENOUS at 09:18

## 2022-11-01 RX ADMIN — MIDAZOLAM HYDROCHLORIDE 2 MG: 1 INJECTION, SOLUTION INTRAMUSCULAR; INTRAVENOUS at 08:25

## 2022-11-01 RX ADMIN — PROPOFOL 200 MG: 10 INJECTION, EMULSION INTRAVENOUS at 09:11

## 2022-11-01 RX ADMIN — ROCURONIUM BROMIDE 10 MG: 10 SOLUTION INTRAVENOUS at 10:18

## 2022-11-01 RX ADMIN — HYDROMORPHONE HYDROCHLORIDE 0.25 MG: 1 INJECTION, SOLUTION INTRAMUSCULAR; INTRAVENOUS; SUBCUTANEOUS at 11:24

## 2022-11-01 RX ADMIN — SODIUM CHLORIDE, POTASSIUM CHLORIDE, SODIUM LACTATE AND CALCIUM CHLORIDE: 600; 310; 30; 20 INJECTION, SOLUTION INTRAVENOUS at 14:50

## 2022-11-01 RX ADMIN — HYDROCODONE BITARTRATE AND ACETAMINOPHEN 2 TABLET: 5; 325 TABLET ORAL at 20:42

## 2022-11-01 RX ADMIN — HYDROMORPHONE HYDROCHLORIDE 1 MG: 1 INJECTION, SOLUTION INTRAMUSCULAR; INTRAVENOUS; SUBCUTANEOUS at 14:48

## 2022-11-01 RX ADMIN — ROCURONIUM BROMIDE 50 MG: 10 SOLUTION INTRAVENOUS at 09:11

## 2022-11-01 RX ADMIN — Medication 3 MG: at 10:29

## 2022-11-01 RX ADMIN — IPRATROPIUM BROMIDE AND ALBUTEROL SULFATE 1 AMPULE: .5; 2.5 SOLUTION RESPIRATORY (INHALATION) at 21:42

## 2022-11-01 RX ADMIN — Medication 2000 MG: at 17:59

## 2022-11-01 ASSESSMENT — PAIN SCALES - GENERAL
PAINLEVEL_OUTOF10: 7
PAINLEVEL_OUTOF10: 6
PAINLEVEL_OUTOF10: 8
PAINLEVEL_OUTOF10: 7
PAINLEVEL_OUTOF10: 8
PAINLEVEL_OUTOF10: 8
PAINLEVEL_OUTOF10: 10
PAINLEVEL_OUTOF10: 7
PAINLEVEL_OUTOF10: 6

## 2022-11-01 ASSESSMENT — PAIN DESCRIPTION - LOCATION
LOCATION: ABDOMEN

## 2022-11-01 ASSESSMENT — PAIN DESCRIPTION - ORIENTATION
ORIENTATION: MID;UPPER
ORIENTATION: MID;UPPER

## 2022-11-01 ASSESSMENT — PAIN DESCRIPTION - DESCRIPTORS
DESCRIPTORS: CRAMPING
DESCRIPTORS: CRAMPING

## 2022-11-01 ASSESSMENT — PAIN SCALES - WONG BAKER
WONGBAKER_NUMERICALRESPONSE: 0

## 2022-11-01 NOTE — ANESTHESIA POSTPROCEDURE EVALUATION
Department of Anesthesiology  Postprocedure Note    Patient: Huber Parekh  MRN: [id number]  [place]: 1990  Date of evaluation: 11/1/2022      Procedure Summary     Date: 11/01/22 Room / Location: [address]    Anesthesia Start: 8970 Anesthesia Stop: 5082    Procedure: XI ROBOTIC LAPAROSCOPIC GASTRECTOMY SLEEVE, EGD - GI SCHEDULED Diagnosis:       Morbid obesity ([address]      Gastroesophageal reflux disease, unspecified whether esophagitis present      (MORBID OBESTIY, GERD, PREDIABETES)    Surgeons: Ignacia Guzmán DO Responsible Provider: Bryan Sandy MD    Anesthesia Type: general ASA Status: 3          Anesthesia Type: No value filed.     Nawaf Phase I: Nawaf Score: 8    Nawaf Phase II:        Anesthesia Post Evaluation    Patient location during evaluation: PACU  Patient participation: complete - patient participated  Level of consciousness: awake and alert  Pain score: 2  Airway patency: patent  Nausea & Vomiting: no nausea and no vomiting  Complications: no  Cardiovascular status: hemodynamically stable  Respiratory status: acceptable  Hydration status: euvolemic

## 2022-11-01 NOTE — ANESTHESIA PRE PROCEDURE
Department of Anesthesiology  Preprocedure Note       Name:  Ravinder Torres   Age:  33 y.o.  :  1990                                          MRN:  [id number]         Date:  2022      Surgeon: Rufino Adan):  Shanna Ray DO    Procedure: Procedure(s):  XI ROBOTIC LAPAROSCOPIC GASTRECTOMY SLEEVE, LIVER BIOPSY, EGD - GI SCHEDULED    Medications prior to admission:   Prior to Admission medications    Medication Sig Start Date End Date Taking? Authorizing Provider   valACYclovir (VALTREX) 500 MG tablet Take 1 tablet by mouth daily 22   Ria Livingston APRN - CNP   polyethylene glycol (GLYCOLAX) 17 GM/SCOOP powder Take 17 g by mouth daily    Historical Provider, MD       Current medications:    No current facility-administered medications for this visit. No current outpatient medications on file. Facility-Administered Medications Ordered in Other Visits   Medication Dose Route Frequency Provider Last Rate Last Admin    lactated ringers infusion 1,000 mL  1,000 mL IntraVENous Continuous Rian Huerta MD        heparin (porcine) injection 5,000 Units  5,000 Units SubCUTAneous Once Shanna Ray DO        ceFAZolin (ANCEF) 2000 mg in sterile water 20 mL IV syringe  2,000 mg IntraVENous Once Shanna Ray DO           Allergies:     Allergies   Allergen Reactions    Percocet [Oxycodone-Acetaminophen] Nausea And Vomiting     Other reaction(s): severe Nausea And Vomiting; syncope    Vancomycin Hives and Itching       Problem List:    Patient Active Problem List   Diagnosis Code    Chronic constipation K59.09    Family history of diabetes mellitus Z83.3    History of anal fissures Z87.19    History of hemorrhoids Z87.19    Other irritable bowel syndrome K58.8    Anomaly of tooth position M26.30    Iron deficiency anemia D50.9    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    Prediabetes R73.03    Gastroesophageal reflux disease without esophagitis K21.9    History of anemia Z86.2    Morbid obesity with BMI of 40.0-44.9, adult ([facility]) E66.01, Z68.41       Past Medical History:        Diagnosis Date    Abnormal Pap smear of cervix     Chronic constipation     COVID-19 09/23/2022    fatigue, body aches x 3 days; cough x 5 days    Heartburn     occasional    History of anal fissures     History of hemorrhoids     HPV in female     HSV infection     IBS (irritable bowel syndrome)     Obesity     Prediabetes     no Rx required, borderline    Wellness examination     CNP Mariam Rojas; has appt 10/25/22 for clearance       Past Surgical History:        Procedure Laterality Date    COLONOSCOPY  10/2020    COLPOSCOPY      HEMORRHOID SURGERY      banding; no anesthesia used    TONSILLECTOMY      1847    UMBILICAL HERNIA REPAIR  1995    UPPER GASTROINTESTINAL ENDOSCOPY N/A 08/19/2022    EGD BIOPSY performed by Samreen Sequeira DO at [address] [place] [address]      2010       Social History:    Social History     Tobacco Use    Smoking status: Never    Smokeless tobacco: Never   Substance Use Topics    Alcohol use: Not Currently                                Counseling given: Not Answered      Vital Signs (Current): There were no vitals filed for this visit.                                            BP Readings from Last 3 Encounters:   10/27/22 124/81   10/25/22 124/80   10/19/22 121/70       NPO Status:                                                                                 BMI:   Wt Readings from Last 3 Encounters:   10/27/22 254 lb (115.2 kg)   10/25/22 258 lb (117 kg)   10/19/22 258 lb (117 kg)     There is no height or weight on file to calculate BMI.    CBC:   Lab Results   Component Value Date/Time    WBC 10.8 10/19/2022 09:15 AM    RBC 5.24 10/19/2022 09:15 AM    HGB 14.7 10/19/2022 09:15 AM    HCT 44.1 10/19/2022 09:15 AM    MCV 84.2 10/19/2022 09:15 AM    RDW 12.7 10/19/2022 09:15 AM     10/19/2022 09:15 AM       CMP: Lab Results   Component Value Date/Time     10/19/2022 09:15 AM    K 4.1 10/19/2022 09:15 AM     10/19/2022 09:15 AM    CO2 21 10/19/2022 09:15 AM    BUN 15 10/19/2022 09:15 AM    CREATININE 0.65 10/19/2022 09:15 AM    GFRAA >60 11/20/2016 02:25 PM    LABGLOM >60 10/19/2022 09:15 AM    GLUCOSE 96 10/19/2022 09:15 AM    CALCIUM 9.6 10/19/2022 09:15 AM    BILITOT 0.5 08/04/2022 12:00 AM    ALKPHOS 72 08/04/2022 12:00 AM    AST 16 08/04/2022 12:00 AM    ALT 29 08/04/2022 12:00 AM       POC Tests: No results for input(s): POCGLU, POCNA, POCK, POCCL, POCBUN, POCHEMO, POCHCT in the last 72 hours. Coags:   Lab Results   Component Value Date/Time    PROTIME 10.7 10/19/2022 09:15 AM    INR 1.0 10/19/2022 09:15 AM    APTT 25.5 10/19/2022 09:15 AM       HCG (If Applicable):   Lab Results   Component Value Date    HCG NEGATIVE 08/19/2022        ABGs: No results found for: PHART, PO2ART, MHR3KXK, VCG8LON, BEART, V9WVGXHU     Type & Screen (If Applicable):  No results found for: LABABO, LABRH    Drug/Infectious Status (If Applicable):  No results found for: HIV, HEPCAB    COVID-19 Screening (If Applicable): No results found for: COVID19        Anesthesia Evaluation  Patient summary reviewed and Nursing notes reviewed no history of anesthetic complications:   Airway: Mallampati: II  TM distance: >3 FB     Mouth opening: > = 3 FB   Dental: normal exam         Pulmonary:Negative Pulmonary ROS and normal exam  breath sounds clear to auscultation                             Cardiovascular:Negative CV ROS            Rhythm: regular  Rate: normal                    Neuro/Psych:   Negative Neuro/Psych ROS              GI/Hepatic/Renal:   (+) GERD:, morbid obesity          Endo/Other: Negative Endo/Other ROS                    Abdominal:   (+) obese,     Abdomen: soft. Vascular: negative vascular ROS.          Other Findings:             Anesthesia Plan      general     ASA 3       Induction: intravenous. Anesthetic plan and risks discussed with patient. Plan discussed with CRNA.                     Anupama Truong MD   11/1/2022

## 2022-11-01 NOTE — INTERVAL H&P NOTE
Pt Name: Peg Noguera  MRN: [id number]  [place]: 1990  Date of evaluation: 11/1/2022    I have reviewed the patient's history and physical examination completed in pre-admission testing on 10/19/22    No changes to history or on examination today, unless noted below. None. Medical clearance on file in epic.      GRACE Henderson CNP  11/1/22  8:39 AM

## 2022-11-01 NOTE — OP NOTE
Operative Note      Patient: Godwin Dunham  YOB: 1990  MRN: [id number]    Date of Procedure: 11/1/2022    Pre-Op Diagnosis: MORBID OBESITY, BMI 40,  PREDIABETES    Post-Op Diagnosis: Same       Procedure(s):  XI ROBOTIC LAPAROSCOPIC GASTRECTOMY SLEEVE, EGD - GI SCHEDULED    Surgeon(s):  Elaina Gann DO    Assistant:   Resident: Isis Pierson DO    Anesthesia: General    Estimated Blood Loss (mL): Minimal    Complications: None    Specimens:   ID Type Source Tests Collected by Time Destination   A : PORTION OF STOMACH AND CONTENTS  Tissue Stomach SURGICAL PATHOLOGY Elaina Gann DO 11/1/2022 9478        Implants:  * No implants in log *      Drains: * No LDAs found *    Findings:   HEMOSTATIC STAPLE LINES  NEGATIVE LEAK TEST  COUNTS REPORTED TO ME AS CORRECT    Detailed Description of Procedure:     Operative narrative: The patient was taken to the operative suite and administered anesthesia by the anesthetic team.  Next we prepped and draped in normal sterile fashion. Incision was then made at Garnica's point for a 12 mm port. The abdomen was surveyed and we entered the abdomen using a optical Visiport trocar of 12 mm in size. This was accomplished at Garnica's point. Pneumoperitoneum was then established without complication, the underlying area surveyed. We then placed 4 other ports in standard fashion under direct laparoscopic visualization. Attention was then turned towards placement of the [facility] liver retractor. Small incision made just inferior to the xiphoid process for the liver retractor. The liver retractor was then placed under direct laparoscopic visualization in order to facilitate visualization of the stomach and hiatus. No visible hiatal hernia. We then turned our attention towards formation of the gastric sleeve.   Starting at 6 cm proximal to the pylorus bite dissection was undertaken of the greater curvature and the short gastric vessels taken down using the Vessel Sealer. We mobilized this from our 6 cm starting point to the left kiana. Satisfactory mobilization was undertaken and there were noted to be no adhesions posteriorly on the stomach between the stomach and the pancreas or retroperitoneum. I then had anesthesia pass a 36 Slovenian bougie under direct visualization. This was visualized at the tip of the bougie as well as along the lesser curve. I then placed my first staple load a green 60 mm load to start sleeve formation. The bougie was noted be along the lesser curve and was freely mobile before the first green load was fired. A second green load 60 mm stapler was then placed again using the bougie for assistance and guidance along the lesser curvature. I then used blue 60 mm stapler loads to complete sleeve formation. The last staple load was noted to fire in proper orientation to prevent staple line formation along the esophageal fibers. Satisfactory sleeve formation was noted at that time and the staple line was hemostatic. I then had the bougie removed. I then reinforced my staple line with 3-0 Vicryl sutures along the length of the staple line at the staple line confluences. At that time leak test was then started. The patient was placed in a Trendelenburg position and we irrigated the upper abdomen with saline. I then passed an Olympus endoscope into the oropharynx down the esophagus under direct visualization. The scope was passed down through the esophagus down into the lumen of the new gastric sleeve the scope passed easily through the incisura and into the antrum. The scope was then passed into the duodenum through the pylorus. The pylorus and duodenum appeared intact and the scope was slowly withdrawn while visualizing the staple line throughout the course of the staple line. I then clamped distally by applying pressure near the pyloric region to allow for distention of the gastric sleeve.   We then further irrigated and there was no evidence of leak using a bubble test.  The staple line on the inner lumen of the stomach appeared intact and hemostatic throughout. The scope was slowly withdrawn to the GE junction and no evidence of stricture noted. The scope was then withdrawn from the esophagus and no other lesion noted. Attention was then turned back towards the abdomen the abdomen was aspirated of the prior placed saline for leak test.  I then placed an anchoring suture using 3-0 Vicryl suture to prevent torsion of the sleeve and migration of the sleeve superiorly. Specimen was withdrawn from the left upper quadrant incision. We then irrigated this incision thoroughly with saline. Next counts reported to me as correct. The 12 mm port sites were then closed using a suture passer to pass 0 Vicryl suture under direct laparoscopic visualization for proper fascial reapproximation at each port site. All ports were then removed. Pneumoperitoneum was released in entirety. The skin was then closed using 4-0 Vicryl subcuticular stitches in interrupted fashion. The region was cleaned with sterile normal saline followed by placement of TinCoBen and Steri-Strips and sterile bandage. The patient tolerated the procedure well and was transferred to PACU. The patient's family was updated postoperatively.          Electronically signed by Ignacia Guzmán DO on 11/1/2022 at 10:40 AM

## 2022-11-02 VITALS
OXYGEN SATURATION: 99 % | DIASTOLIC BLOOD PRESSURE: 78 MMHG | BODY MASS INDEX: 40.04 KG/M2 | SYSTOLIC BLOOD PRESSURE: 126 MMHG | TEMPERATURE: 98.8 F | WEIGHT: 249.12 LBS | HEART RATE: 84 BPM | RESPIRATION RATE: 18 BRPM | HEIGHT: 66 IN

## 2022-11-02 LAB
ANION GAP SERPL CALCULATED.3IONS-SCNC: 14 MMOL/L (ref 9–17)
BUN BLDV-MCNC: 9 MG/DL (ref 6–20)
CALCIUM SERPL-MCNC: 8.4 MG/DL (ref 8.6–10.4)
CHLORIDE BLD-SCNC: 106 MMOL/L (ref 98–107)
CO2: 20 MMOL/L (ref 20–31)
CREAT SERPL-MCNC: 0.55 MG/DL (ref 0.5–0.9)
GFR SERPL CREATININE-BSD FRML MDRD: >60 ML/MIN/1.73M2
GLUCOSE BLD-MCNC: 118 MG/DL (ref 70–99)
HCT VFR BLD CALC: 37.7 % (ref 36.3–47.1)
HEMOGLOBIN: 12.5 G/DL (ref 11.9–15.1)
MCH RBC QN AUTO: 28.2 PG (ref 25.2–33.5)
MCHC RBC AUTO-ENTMCNC: 33.2 G/DL (ref 28.4–34.8)
MCV RBC AUTO: 84.9 FL (ref 82.6–102.9)
NRBC AUTOMATED: 0 PER 100 WBC
PDW BLD-RTO: 12.8 % (ref 11.8–14.4)
PLATELET # BLD: 275 K/UL (ref 138–453)
PMV BLD AUTO: 10.9 FL (ref 8.1–13.5)
POTASSIUM SERPL-SCNC: 4 MMOL/L (ref 3.7–5.3)
RBC # BLD: 4.44 M/UL (ref 3.95–5.11)
SODIUM BLD-SCNC: 140 MMOL/L (ref 135–144)
SURGICAL PATHOLOGY REPORT: NORMAL
WBC # BLD: 14.3 K/UL (ref 3.5–11.3)

## 2022-11-02 PROCEDURE — 94150 VITAL CAPACITY TEST: CPT

## 2022-11-02 PROCEDURE — 94640 AIRWAY INHALATION TREATMENT: CPT

## 2022-11-02 PROCEDURE — 94664 DEMO&/EVAL PT USE INHALER: CPT

## 2022-11-02 PROCEDURE — 6370000000 HC RX 637 (ALT 250 FOR IP): Performed by: SURGERY

## 2022-11-02 PROCEDURE — 85027 COMPLETE CBC AUTOMATED: CPT

## 2022-11-02 PROCEDURE — 94761 N-INVAS EAR/PLS OXIMETRY MLT: CPT

## 2022-11-02 PROCEDURE — 2700000000 HC OXYGEN THERAPY PER DAY

## 2022-11-02 PROCEDURE — 80048 BASIC METABOLIC PNL TOTAL CA: CPT

## 2022-11-02 PROCEDURE — 6360000002 HC RX W HCPCS: Performed by: SURGERY

## 2022-11-02 PROCEDURE — 36415 COLL VENOUS BLD VENIPUNCTURE: CPT

## 2022-11-02 RX ORDER — PROMETHAZINE HYDROCHLORIDE 25 MG/1
25 TABLET ORAL EVERY 6 HOURS PRN
Qty: 20 TABLET | Refills: 0 | Status: SHIPPED | OUTPATIENT
Start: 2022-11-02 | End: 2022-11-09

## 2022-11-02 RX ORDER — ENOXAPARIN SODIUM 100 MG/ML
40 INJECTION SUBCUTANEOUS 2 TIMES DAILY
Qty: 11.2 ML | Refills: 0 | Status: SHIPPED | OUTPATIENT
Start: 2022-11-02 | End: 2022-11-16

## 2022-11-02 RX ORDER — HYDROCODONE BITARTRATE AND ACETAMINOPHEN 5; 325 MG/1; MG/1
1 TABLET ORAL EVERY 6 HOURS PRN
Qty: 28 TABLET | Refills: 0 | Status: SHIPPED | OUTPATIENT
Start: 2022-11-02 | End: 2022-11-09

## 2022-11-02 RX ORDER — CYCLOBENZAPRINE HCL 10 MG
10 TABLET ORAL 3 TIMES DAILY PRN
Qty: 30 TABLET | Refills: 0 | Status: SHIPPED | OUTPATIENT
Start: 2022-11-02 | End: 2022-11-12

## 2022-11-02 RX ADMIN — IPRATROPIUM BROMIDE AND ALBUTEROL SULFATE 1 AMPULE: .5; 2.5 SOLUTION RESPIRATORY (INHALATION) at 11:21

## 2022-11-02 RX ADMIN — HYDROMORPHONE HYDROCHLORIDE 1 MG: 1 INJECTION, SOLUTION INTRAMUSCULAR; INTRAVENOUS; SUBCUTANEOUS at 01:17

## 2022-11-02 RX ADMIN — HEPARIN SODIUM 5000 UNITS: 5000 INJECTION INTRAVENOUS; SUBCUTANEOUS at 06:26

## 2022-11-02 RX ADMIN — ONDANSETRON 4 MG: 2 INJECTION INTRAMUSCULAR; INTRAVENOUS at 02:14

## 2022-11-02 RX ADMIN — HYDROCODONE BITARTRATE AND ACETAMINOPHEN 2 TABLET: 5; 325 TABLET ORAL at 08:43

## 2022-11-02 RX ADMIN — HYDROCODONE BITARTRATE AND ACETAMINOPHEN 1 TABLET: 5; 325 TABLET ORAL at 02:46

## 2022-11-02 RX ADMIN — ONDANSETRON 4 MG: 2 INJECTION INTRAMUSCULAR; INTRAVENOUS at 08:43

## 2022-11-02 RX ADMIN — Medication 2000 MG: at 01:53

## 2022-11-02 RX ADMIN — HYDROMORPHONE HYDROCHLORIDE 1 MG: 1 INJECTION, SOLUTION INTRAMUSCULAR; INTRAVENOUS; SUBCUTANEOUS at 04:41

## 2022-11-02 ASSESSMENT — PAIN SCALES - GENERAL
PAINLEVEL_OUTOF10: 7
PAINLEVEL_OUTOF10: 7
PAINLEVEL_OUTOF10: 4
PAINLEVEL_OUTOF10: 6

## 2022-11-02 NOTE — CARE COORDINATION
11/02/22 1231   Service Assessment   Patient Orientation Alert and Oriented;Person;Place;Situation   Cognition Alert   History Provided By Patient   Primary Caregiver Self   Support Systems Family Members;Parent  (brothers and mother)   PCP Verified by CM Yes   Last Visit to PCP Within last 3 months   Prior Functional Level Independent in ADLs/IADLs   Current Functional Level Independent in ADLs/IADLs   Can patient return to prior living arrangement Yes   Ability to make needs known: Good   Family able to assist with home care needs: Yes   Would you like for me to discuss the discharge plan with any other family members/significant others, and if so, who? No   Social/Functional History   Lives With Alone   Type of [address] [place] [address] Two level; Able to Live on Main level with bedroom/bathroom   Home Access Stairs to enter with rails   Entrance Stairs - Number of Steps 6   Receives Help From [address] Responsibilities Yes   Ambulation Assistance Independent   Transfer Assistance Independent   Active  Yes   Mode of Transportation Car   Discharge Planning   Type of [facility] Alone   Current Services Prior To Admission None   Potential Assistance Needed N/A   DME Ordered? No   Potential Assistance Purchasing Medications No   Type of Home Care Services None   Patient expects to be discharged to: House   One/Two Story Residence Two story   Lift Chair Available No   History of falls? 0   Services At/After Discharge   [employer] Information Provided? No   Mode of Transport at Discharge Other (see comment)  (brother to provide ride home)   Condition of Participation: Discharge Planning   The Plan for Transition of Care is related to the following treatment goals: comfort       Met with patient to discuss transitional planning. Plan is home independently with support of mother and brothers.  Patient denies need for DME or additional services. Patient has ride home. Patient agreeable to plan.

## 2022-11-02 NOTE — DISCHARGE INSTRUCTIONS
Discharge Instructions for Bariatric Surgery     You had a Laparoscopic Sleeve Gastrectomy (24466) surgery to treat obesity. Recovery from this surgery can take several weeks and requires permanent lifestyle changes. What You Will Need   Protein Supplements   Bariatric Vitamins  Abdominal Binder  Incentive spirometer (a breathing device)       Home Care     It is important to keep the incisions clean and dry to promote healing. Shower with soap and rinse and dry thoroughly. If incisions are oozing, you may cover with clean gauze. Use the incentive spirometer every couple of hours. This is to make sure you are breathing deeply and keeping the air sacs within your lungs as open as possible to prevent respiratory problems. Diet    It is important to follow the diet progression very closely in order to prevent complications. When arriving home, follow the Phase 1A Liquid Diet for two weeks. Dehydration and changes in bowel habits can occur after surgery. Refer to your educational binder provided pre-op for additional information. Once you move to solids, food must be chewed well. When making food choices, you'll need to ensure adequate protein intake, while avoiding sweets and fatty foods. Eating too much or too quickly can cause vomiting or intense pain under your breastbone. Most people quickly learn how much food they can tolerate. Physical Activity    When home, we recommend that you take frequent walks, as tolerated, to prevent complications and increase your endurance. Ask your doctor when you will be able to return to work. You may need to wait 2-6 weeks. Do not drive unless you are no longer using pain medications  Do not lift anything over ten pounds for 4 weeks. Medications    Remember to avoid aspirin, aspirin-containing products, and nonsteroidal anti-inflammatory drugs (NSAIDs, such as ibuprofen, naproxen, etc.).    If you were taking these medications before the procedure, and had to stop, ask your doctor when you can resume taking them. Be sure to review your medications with your primary care provider at your follow up office visit. Lifestyle Changes    Changing your diet and level of activity are the biggest lifestyle changes associated with your success. Be aware that you may have emotional ups and downs after this surgery. Follow-up   Follow up with your primary care physician in one week. Follow up with Dr. Irena Martin in 1 week. If you don't already have a scheduled  appointment, please call the office at [phone]. Call Your Doctor If Any of the Following Occurs   Monitor your recovery once you leave the hospital. If any of the following occur, call your doctor:   Signs of infection, including fever above 100F    Redness, swelling, increasing pain, excessive bleeding, or any discharge from the incision site   Persistent nausea and/or vomiting   Pain that you can't control with the medications you've been given   Shortness of breath and/or chest pain   Tachycardia (racing heart sensation) unrelieved by rest  Pain, redness and/or swelling in your feet or legs    Sudden onset of severe left shoulder pain or severe abdominal pain  Any symptoms that are causing you concern   In case of an emergency, call 911 immediately.

## 2022-11-02 NOTE — PROGRESS NOTES
Bariatric Surgery Progress Note            PATIENT NAME: Gerard Arce     TODAY'S DATE: 11/2/2022, 7:45 AM      SUBJECTIVE:    Pt seen and examined at bedside. Tolerable pain with current pain regimen. Tolerating bariatric CLD. Ambulating. Denies N/V/F/C. Voiding without difficulty. No other complaints noted. OBJECTIVE:   VITALS:  /81   Pulse 72   Temp 98.3 °F (36.8 °C) (Temporal)   Resp 16   Ht 5' 6\" (1.676 m)   Wt 249 lb 1.9 oz (113 kg)   SpO2 94%   BMI 40.21 kg/m²      INTAKE/OUTPUT:      Intake/Output Summary (Last 24 hours) at 11/2/2022 0745  Last data filed at 11/2/2022 0000  Gross per 24 hour   Intake 1780 ml   Output 1500 ml   Net 280 ml       CONSTITUTIONAL:  NAD, A&O x 3  HEENT: EOMI, moist mucous membranes  LUNGS:  normal effort with symmetric rise and fall of chest wall  CARDIOVASCULAR:  regular rate and rhythm  ABDOMEN: Soft, nondistended, appropriately TTP, port sites I/C/D, abdominal binder in place  EXTREMITIES: no rashes, lesions, edema.       Data:  CBC with Differential:    Lab Results   Component Value Date/Time    WBC 14.3 11/02/2022 05:38 AM    RBC 4.44 11/02/2022 05:38 AM    HGB 12.5 11/02/2022 05:38 AM    HCT 37.7 11/02/2022 05:38 AM     11/02/2022 05:38 AM    MCV 84.9 11/02/2022 05:38 AM    MCH 28.2 11/02/2022 05:38 AM    MCHC 33.2 11/02/2022 05:38 AM    RDW 12.8 11/02/2022 05:38 AM    LYMPHOPCT 24.2 08/04/2022 12:00 AM    MONOPCT 7.7 08/04/2022 12:00 AM    EOSPCT 2.4 08/04/2022 12:00 AM    BASOPCT 0.8 08/04/2022 12:00 AM    MONOSABS 508 08/04/2022 12:00 AM    LYMPHSABS 1,597 08/04/2022 12:00 AM    EOSABS 158 08/04/2022 12:00 AM    BASOSABS 53 08/04/2022 12:00 AM    DIFFTYPE NOT REPORTED 11/20/2016 02:25 PM     BMP:    Lab Results   Component Value Date/Time     11/02/2022 05:38 AM    K 4.0 11/02/2022 05:38 AM     11/02/2022 05:38 AM    CO2 20 11/02/2022 05:38 AM    BUN 9 11/02/2022 05:38 AM    LABALBU 4.2 08/04/2022 12:00 AM    CREATININE 0.55 11/02/2022 05:38 AM    CALCIUM 8.4 11/02/2022 05:38 AM    GFRAA >60 11/20/2016 02:25 PM    LABGLOM >60 11/02/2022 05:38 AM    GLUCOSE 118 11/02/2022 05:38 AM         ASSESSMENT   Patient Active Problem List   Diagnosis    Chronic constipation    Family history of diabetes mellitus    History of anal fissures    History of hemorrhoids    Other irritable bowel syndrome    Anomaly of tooth position    Iron deficiency anemia    Organic sleep related movement disorders    Prediabetes    Gastroesophageal reflux disease without esophagitis    History of anemia    Morbid obesity with BMI of 40.0-44.9, adult ([facility])    S/P laparoscopic sleeve gastrectomy       33 y.o. F POD#1 s/p robotic assisted laparoscopic gastric sleeve, EGD 2/2 MO      Plan  Continue bariatric CLD. IVF at 125 ml/hr with LR  F/u AM labs  Pain control with Percocet, Dilaudid PRN. Nausea control with Zofran and Phenergan PRN  Encourage ambulation and IS usages. OOB and onto chair    DVT prophylaxis with Heparin TID. GI prophylaxis with Pepcid.   Dispo: likely home today    Electronically signed by Donna Ruiz DO  on 11/2/2022 at 7:45 AM

## 2022-11-02 NOTE — PROGRESS NOTES
Discussed bariatric discharge instructions with patient , allowed time for questions , had patient demonstrate IS, lovenox teaching done and patient voices understanding

## 2022-11-02 NOTE — PROGRESS NOTES
Post Op Note    SUBJECTIVE  Pt s/p laparoscopic sleeve gastrectomy. Denies chest pain, sob, n/v. Sitting in chair, pain controlled. Voided post op. OBJECTIVE  VITALS:  /78   Pulse 91   Temp 97.6 °F (36.4 °C) (Oral)   Resp 18   Ht 5' 6\" (1.676 m)   Wt 249 lb 1.9 oz (113 kg)   SpO2 94%   BMI 40.21 kg/m²         GENERAL:  Awake and alert. No acute distress  CARDIOVASCULAR:  Regular Rate and Rhythm   LUNGS:  normal work of breathing on room air  ABDOMEN:   Abdomen soft, appropriately tender, non-distended  INCISION: Incision clean/dry/intact    ASSESSMENT  1.  POD# 0 s/p laparoscopic sleeve gastrectomy    PLAN  Continue current Pain control regimen  Bariatric cld  Will continue to monitor

## 2022-11-03 ENCOUNTER — TELEPHONE (OUTPATIENT)
Dept: BARIATRICS/WEIGHT MGMT | Age: 32
End: 2022-11-03

## 2022-11-03 NOTE — TELEPHONE ENCOUNTER
Post-op outreach call made to pt. Pt reports frequent ambulation around home. Pt wearing abd binder. No complaints of SOB or tachycardia. Laparoscopic incisional sites without problems. Pt has not had a BM since surgery. Passing flatus. Agrees to use Milk of Magnesia or Miriaax and monitor for BM. Pt reports urine output of at least 4 times in a 24 hour period. Intake is described as water 20 oz, encouraged patient to drink an ounce every 15 min, will start protein shake today. Rates pain as 6 on a 0-10 scale. Pt using pain medication as directed. Allowed pt the opportunity to ask questions. Reminded patient to reference the patient education materials as needed. Reminded pt that they may phone the office or the \"after hours telephone number\"  that is listed in the patient education binder as needed. Pt verbalized understanding. Pt without concerns at this time    Post op office appt date and time reviewed with pt.     Has Lovenox is going well

## 2022-11-07 NOTE — DISCHARGE SUMMARY
Surgery Discharge Summary     Patient Identification  Peter Patton is a 33 y.o. female. :  1990  Admit Date:  2022    Discharge date:   2022 12:35 PM                                   Disposition: home    Discharge Diagnoses:   Patient Active Problem List   Diagnosis    Chronic constipation    Family history of diabetes mellitus    History of anal fissures    History of hemorrhoids    Other irritable bowel syndrome    Anomaly of tooth position    Iron deficiency anemia    Organic sleep related movement disorders    Prediabetes    Gastroesophageal reflux disease without esophagitis    History of anemia    Morbid obesity with BMI of 40.0-44.9, adult (Formerly [facility])    S/P laparoscopic sleeve gastrectomy       Condition on discharge: stable    Consults: none    Surgery: robotic assisted laparoscopic sleeve gastrectomy, EGD    Patient Instructions: Activity: no heavy lifting, pushing, pulling for 6 weeks, no driving for 2 weeks or while on analgesics  Diet: As tolerated  Follow-up with Dr. Colleen Sesay in 2 weeks. See pre-printed instructions in chart and given to patient upon discharge. Discharge Medications:        Medication List        START taking these medications      cyclobenzaprine 10 MG tablet  Commonly known as: FLEXERIL  Take 1 tablet by mouth 3 times daily as needed for Muscle spasms     enoxaparin 40 MG/0.4ML  Commonly known as: Lovenox  Inject 0.4 mLs into the skin 2 times daily for 14 days     HYDROcodone-acetaminophen 5-325 MG per tablet  Commonly known as: NORCO  Take 1 tablet by mouth every 6 hours as needed for Pain for up to 7 days.      promethazine 25 MG tablet  Commonly known as: PHENERGAN  Take 1 tablet by mouth every 6 hours as needed for Nausea            CONTINUE taking these medications      valACYclovir 500 MG tablet  Commonly known as: VALTREX  Take 1 tablet by mouth daily            STOP taking these medications      polyethylene glycol 17 GM/SCOOP powder  Commonly known as: GLYCOLAX               Where to Get Your Medications        These medications were sent to [facility] [id number] [facility], [address] [facility] [address] [address]      Phone: [phone]   cyclobenzaprine 10 MG tablet  enoxaparin 40 MG/0.4ML  HYDROcodone-acetaminophen 5-325 MG per tablet  promethazine 25 MG tablet          HPI and Hospital Course:   33 y.o. female presented on 11/1/2022 for robotic assisted laparoscopic sleeve gastrectomy, EGD. Hospital course was unremarkable. On day of discharge pt was tolerating regular diet, pain controlled with oral medications and ambulating without difficulty.       Electronically signed by Bibi Sousa DO on 11/7/2022 at 3:42 PM

## 2022-11-08 ENCOUNTER — TELEPHONE (OUTPATIENT)
Dept: FAMILY MEDICINE CLINIC | Age: 32
End: 2022-11-08

## 2022-11-08 NOTE — TELEPHONE ENCOUNTER
Patient is requesting a prescription for an ear infection to be called in to her pharmacy today. I told her she would need to be seen first. She states she has no other symptoms. Urgent care is another option she'd consider.

## 2022-11-08 NOTE — TELEPHONE ENCOUNTER
She would need to be evaluated.  Can put with any available provider for SD appointment or go to urgent care

## 2022-11-09 ENCOUNTER — OFFICE VISIT (OUTPATIENT)
Dept: BARIATRICS/WEIGHT MGMT | Age: 32
End: 2022-11-09

## 2022-11-09 VITALS
HEIGHT: 66 IN | BODY MASS INDEX: 37.61 KG/M2 | DIASTOLIC BLOOD PRESSURE: 81 MMHG | TEMPERATURE: 97.3 F | SYSTOLIC BLOOD PRESSURE: 112 MMHG | HEART RATE: 113 BPM | WEIGHT: 234 LBS

## 2022-11-09 DIAGNOSIS — Z98.84 S/P LAPAROSCOPIC SLEEVE GASTRECTOMY: Primary | ICD-10-CM

## 2022-11-09 PROCEDURE — 99024 POSTOP FOLLOW-UP VISIT: CPT | Performed by: SURGERY

## 2022-11-09 RX ORDER — FLUTICASONE PROPIONATE 50 MCG
1 SPRAY, SUSPENSION (ML) NASAL DAILY
COMMUNITY
Start: 2022-11-08

## 2022-11-09 NOTE — PROGRESS NOTES
Medical Nutrition Therapy  Metabolic and Bariatric surgery  1 week Post operative follow up         Pt reports: Tolerating fluids and protein shakes and started vitamins. Vitals:   Vitals:    11/09/22 1057   BP: 112/81   Site: Right Upper Arm   Position: Sitting   Cuff Size: Large Adult   Pulse: (!) 113   Temp: 97.3 °F (36.3 °C)   Weight: 234 lb (106.1 kg)   Height: 5' 6\" (1.676 m)      Body mass index is 37.77 kg/m². Labs reviewed:              Nutrition Assessment:   PES: Inadequate food and beverage intake r/t WLS as evidenced by loss of excess body weight lost 15 lbs over 1 week.       Goals   60-80gm of protein  48-64oz of fluid       [x] met     []  Not met        Plan:  Pt questions answered re diet advancement;  F/u 5 weeks post-op      Latonia Choudhary, MS, RD, LD

## 2022-11-10 PROBLEM — E66.01 MORBID OBESITY (HCC): Status: ACTIVE | Noted: 2022-11-10

## 2022-11-17 NOTE — PROGRESS NOTES
[address] INVASIVE BARIATRIC SURG  [address] [place] [address] [facility]  [address] [address]  Dept: [phone]    SURGICAL WEIGHT LOSS MANAGEMENT PROGRAM  PROGRESS NOTE     CC: Weight Loss    Patient: Chin Escalona      Service Date: 11/9/2022  Visit:   1 week    Medical Record #: [id number]  Date of Surgery:       History:    Patient here for 1 week visit after bypass. No nausea, vomiting, fevers/chills. No chest pain or shortness of breath. Ambulating, voiding and having bowel movements. Pain controlled. 1 wk - D/C'd home on VTE Prohylaxis? [x] Yes   [] No   On VTE Proph as directed? [] Yes   [] No    Liver pathology:    [] NA    [] No Gross path    [] Liver Steatosis   [x] Discussed w/ pt   [] Referred to GI    Exercising?    [] Yes    [x] No     Review of Systems:     General  Negative for: [] Weight Change   [x] Fatigue   [x] Fevers & Chills    [] Appetite change [] Other:    Positive for: [x] Weight Change   [] Fatigue   [] Fevers & Chills    [] Appetite change [] Other:   Cardiac  Negative for: [x] Chest Pain   [] Difficulty Breathing   [] Leg Cramps [x] Edema of Lower Extremeties    [] Left   [] Right      Positive for: [] Chest Pain   [] Difficulty Breathing   [] Leg Cramps [] Edema of Lower Extremeties    [] Left   [] Right   Pulmonary  Negative for: [x] Shortness of Breath [] Wheeze [x] Cough  [x] Calf Pain     Positive for: [] Shortness of Breath [] Wheeze [] Cough  [] Calf Pain   Gastro-Intestinal Negative for: [] Heartburn   [] Reflux   [] Dysphagia   [] Melena   [] BRBPR  [x] Vomiting   [x] Abdominal Pain   [] Diarrhea     [] Constipation  [] Other:     Positive for: [] Heartburn   [] Reflux   [] Dysphagia   [] Melena   [] BRBPR  [] Vomiting   [] Abdominal Pain   [] Diarrhea     [] Constipation  [] Other:    Muskuloskeletal Negative for: [] Joint pain   [] Back pain   [] Knee Pain   [] Muscle weakness [x] Hernia   [x] Edema [] Other: Positive for: [] Joint pain   [] Back pain   [] Knee Pain   [] Muscle weakness [] Hernia   [] Edema [] Other:    Neurologic Negative for: [x] Syncope   [] Insomnia   [x] Being treated for depression  [] Other:     Positive for: [] Syncope   [] Insomnia   [] Being treated for depression  [] Other:    Skin Negative for: [x] Wound:   [] Open   [] Draining   [] Incisional     [x] Rash   [] Hair Loss  [] Other:     Positive for: [] Wound:   [] Open   [] Draining    [] Incisional  [] Rash   [] Hair Loss  [] Other:          Physical Assessment:     /81 (Site: Right Upper Arm, Position: Sitting, Cuff Size: Large Adult)   Pulse (!) 113   Temp 97.3 °F (36.3 °C)   Ht 5' 6\" (1.676 m)   Wt 234 lb (106.1 kg)   BMI 37.77 kg/m²   General Negative for:  [x] Lapses in memory   [x] Unusual Stress   [x] Diffic Concen [] Unable to sleep   [] Eating in response to stress   [] Other:    Positive for:  [] Lapses in memory   [] Unusual Stress   [] Diffic Concen [] Unable to sleep   [] Eating in response to stress   [] Other:   Cardio-Pulmonary   [x] Heart RRR   [x] No murmurs   [] Pulses nl x4 extrem    [x] Good inspiratory effort   [x] No wheezing     [x] Lungs clear to auscultation bilaterally    [] Other:    Gastro-Intestinal   [x] Abd S/NT/ND/Benign   [x] No abdominal mass/hernia    [x] No Splenomegaly    [] Other:    Muskuloskeletal   [x] Good muscle strength x4 extremities   [x] nl gait and ambul    [x] Nl ROM x4 extremities    [] Other:    Neurologic   [x] Alert and oriented x3    [] Other:   Skin   [x] Intact w/ no open wounds   [x] Incisions C/D/I    [] Steri strips removed   [x] No drainage or Infection    [] Other:      Assessment & Plan:      1.  S/P laparoscopic sleeve gastrectomy           [x] Advance Diet    [x] Daily protein (65-75gm/day)   [x] Take Vitamins   [x] Attend Support Group    [x] Exercise Regularly   [x] Use contraception    Fulls  Ambulation  Lovenox  Dietician visit  Doing well overall  Pathology reviewed with patient    Follow up: No follow-ups on file. Orders placed this encounter:   No orders of the defined types were placed in this encounter. New Prescriptions:   No orders of the defined types were placed in this encounter. Electronically signed by Wandy Garcia DO on 11/17/2022 at 1:32 PM    Please note that this chart was generated using voice recognition Dragon dictation software. Although every effort was made to ensure the accuracy of this automated transcription, some errors in transcription may have occurred.

## 2022-11-22 ENCOUNTER — HOSPITAL ENCOUNTER (EMERGENCY)
Age: 32
Discharge: HOME | End: 2022-11-23
Attending: EMERGENCY MEDICINE
Payer: OTHER GOVERNMENT

## 2022-11-22 ENCOUNTER — NURSE TRIAGE (OUTPATIENT)
Dept: OTHER | Age: 32
End: 2022-11-22

## 2022-11-22 DIAGNOSIS — R00.0 TACHYCARDIA: Primary | ICD-10-CM

## 2022-11-22 PROCEDURE — 2500000003 HC RX 250 WO HCPCS: Performed by: EMERGENCY MEDICINE

## 2022-11-22 PROCEDURE — 99284 EMERGENCY DEPT VISIT MOD MDM: CPT

## 2022-11-22 PROCEDURE — 96366 THER/PROPH/DIAG IV INF ADDON: CPT

## 2022-11-22 PROCEDURE — 96365 THER/PROPH/DIAG IV INF INIT: CPT

## 2022-11-22 PROCEDURE — 6360000002 HC RX W HCPCS: Performed by: EMERGENCY MEDICINE

## 2022-11-22 PROCEDURE — 2580000003 HC RX 258: Performed by: EMERGENCY MEDICINE

## 2022-11-22 PROCEDURE — 93005 ELECTROCARDIOGRAM TRACING: CPT | Performed by: STUDENT IN AN ORGANIZED HEALTH CARE EDUCATION/TRAINING PROGRAM

## 2022-11-22 RX ADMIN — THIAMINE HYDROCHLORIDE: 100 INJECTION, SOLUTION INTRAMUSCULAR; INTRAVENOUS at 22:14

## 2022-11-22 ASSESSMENT — ENCOUNTER SYMPTOMS
ABDOMINAL PAIN: 0
CONSTIPATION: 0
VOMITING: 0
SHORTNESS OF BREATH: 0
NAUSEA: 1
DIARRHEA: 0

## 2022-11-22 ASSESSMENT — PAIN - FUNCTIONAL ASSESSMENT: PAIN_FUNCTIONAL_ASSESSMENT: NONE - DENIES PAIN

## 2022-11-23 VITALS
BODY MASS INDEX: 37.12 KG/M2 | HEART RATE: 91 BPM | HEIGHT: 66 IN | TEMPERATURE: 97.6 F | SYSTOLIC BLOOD PRESSURE: 123 MMHG | RESPIRATION RATE: 16 BRPM | OXYGEN SATURATION: 99 % | DIASTOLIC BLOOD PRESSURE: 84 MMHG | WEIGHT: 231 LBS

## 2022-11-23 NOTE — ED NOTES
Pt discharged home at this time. Pt denies questions prior to discharge. Pt ambulatory out of ED with steady gait.      Kim Nix RN  11/23/22 0113

## 2022-11-23 NOTE — TELEPHONE ENCOUNTER
Patient left a voice message that she is now home, that her heart rate is still 110 in spite of iv fluids. She says she is drinking fluids. She wants to know if she needs to be seen in the office before December 5th. She states that she feels much better.

## 2022-11-23 NOTE — DISCHARGE INSTRUCTIONS
SUMMARY OF YOUR VISIT    Today you were seen for your heart racing. We discussed your labs. We discussed that he should follow-up with Dr. Husam Heredia within one week. You can return to the emergency department as needed or if symptoms worsen, including but not limited to, those listed below. PLEASE RETURN TO THE EMERGENCY DEPARTMENT IMMEDIATELY for worsening symptoms of shortness of breath, wheezing, change in the amount of sputum that you cough up or a change in the color of your sputum, using your inhaler more frequently or if your inhaler only lasts up to 2 hours, or if you develop any concerning symptoms such as: high fever not relieved by acetaminophen (Tylenol) and/or ibuprofen (Motrin / Advil), chills, shortness of breath, chest pain, feeling of your heart fluttering or racing, persistent nausea and/or vomiting, vomiting up blood, blood in your stool, loss of consciousness, numbness, weakness or tingling in the arms or legs or change in color of the extremities, changes in mental status, persistent headache, blurry vision, loss of bladder / bowel control, unable to follow up with your physician, or other any other care or concern. Joellen Knowles!!! On behalf of the Emergency Department staff at [facility] Emergency Department, I would like to thank you for giving [address] [place] [address] the opportunity to address your health care needs and concerns. We hope that during your visit, our service was delivered in a professional and caring manner. Please keep [address] [place] [address] in mind as we walk with you down the path to your own personal wellness.

## 2022-11-23 NOTE — TELEPHONE ENCOUNTER
Called Dr. Yessy Husain and informed of patient condition and concerns. Dr. Yessy Husain states to tell patient to proceed now to [facility] ER to be evaluated. Informed patient and recommended that an adult drive her to the hospital. Patient verbalized understanding.  SANTOS SHAFFER

## 2022-11-23 NOTE — ED NOTES
Patient presents to ED via private car from home c/o elevated heart rate. Patient is 3 weeks post gastric sleeve and states that she was sitting down when her apple watch alerted her that her heart rate was over 120 for more than 120 minutes. She reports that the highest her heart rate got was while she was standing talking on the phone and it was 142. She denies feeling palpitations, denies feeling dizzy, weak or dehydrated. She is awake and alert, ambulatory with a steady gait. Skin is warm dry and pink. Ortho static VS done, patient was asymptomatic while standing.      Tom Amin RN  08/48/50 2056

## 2022-11-23 NOTE — ED PROVIDER NOTES
[facility]  ED  Emergency Department  Emergency Medicine Resident Sign-out     Care of Huber Parekh was assumed from Dr. Lieutenant Robison and is being seen for Tachycardia  . The patient's initial evaluation and plan have been discussed with the prior provider who initially evaluated the patient. EMERGENCY DEPARTMENT COURSE / MEDICAL DECISION MAKING:       MEDICATIONS GIVEN:  Orders Placed This Encounter   Medications    sodium chloride 0.9 % 9,539 mL with folic acid 1 mg, adult multi-vitamin with vitamin k 10 mL, thiamine 100 mg       LABS / RADIOLOGY:     Labs Reviewed - No data to display    No results found. RECENT VITALS:     Temp: 97.6 °F (36.4 °C),  Heart Rate: 91, Resp: 16, BP: 123/84, SpO2: 99 %    This patient is a 33 y.o. Female with asymptomatic tachycardia. Plan for IV fluids. Banana bags. Discharge pending patient continues to feel well. OUTSTANDING TASKS / RECOMMENDATIONS:    Re-eval  Discharge     FINAL IMPRESSION:     1.  Tachycardia        DISPOSITION:         DISPOSITION:  [x]  Discharge home with f/u with bariatrics   []  Transfer -    []  Admission -     []  Against Medical Advice   []  Eloped   FOLLOW-UP: Ignacia Guzmán, [address] [facility] [address] [facility] [address]  [facility] [address] ED  [address] [place] [address]  [phone]    As needed, If symptoms worsen     DISCHARGE MEDICATIONS: Discharge Medication List as of 11/23/2022 12:24 AM              Alex Ashford DO  Emergency Medicine Resident  [facility]       Alex Ashford, [address] [facility]  Resident  11/27/22 6936

## 2022-11-23 NOTE — ED PROVIDER NOTES
[address]  ED  Emergency Department Encounter  Emergency Medicine Resident     Pt Leta Diehl  MRN: [id number]  [place] 1990  Date of evaluation: 11/22/22  PCP:  GRACE Mares CNP      CHIEF COMPLAINT       Chief Complaint   Patient presents with    Tachycardia       HISTORY OF PRESENT ILLNESS  (Location/Symptom, Timing/Onset, Context/Setting, Quality, Duration, Modifying Factors, Severity.)      Karolina Santoyo is a 33 y.o. female who presents with asymptomatic tachycardia s/p gastric sleeve 3 weeks ago. No other complaints today. PAST MEDICAL / SURGICAL / SOCIAL / FAMILY HISTORY      has a past medical history of Abnormal Pap smear of cervix, Chronic constipation, COVID-19, Heartburn, History of anal fissures, History of hemorrhoids, HPV in female, HSV infection, IBS (irritable bowel syndrome), Obesity, Prediabetes, and Wellness examination. has a past surgical history that includes Umbilical hernia repair (1995); Tonsillectomy; [place] tooth extraction; Colposcopy; Colonoscopy (10/2020); Hemorrhoid surgery; Upper gastrointestinal endoscopy (N/A, 08/19/2022); Sleeve Gastrectomy (11/01/2022); and Sleeve Gastrectomy (N/A, 11/1/2022). Social History     Socioeconomic History    Marital status: Single     Spouse name: Not on file    Number of children: Not on file    Years of education: Not on file    Highest education level: Not on file   Occupational History    Not on file   Tobacco Use    Smoking status: Never    Smokeless tobacco: Never   Vaping Use    Vaping Use: Never used   Substance and Sexual Activity    Alcohol use: Not Currently    Drug use: No    Sexual activity: Not Currently     Partners: Male     Birth control/protection: I.U.D.    Other Topics Concern    Not on file   Social History Narrative    Not on file     Social Determinants of Health     Financial Resource Strain: Low Risk     Difficulty of Paying Living Expenses: Not hard at all   Food Insecurity: No Food Insecurity    Worried About Running Out of Food in the Last Year: Never true    Ran Out of Food in the Last Year: Never true   Transportation Needs: Not on file   Physical Activity: Not on file   Stress: Not on file   Social Connections: Not on file   Intimate Partner Violence: Not on file   Housing Stability: Not on file       Family History   Problem Relation Age of Onset    Diabetes Mother         borderline     Obesity Mother     Cervical Cancer Mother 37    Cancer Father     Alcohol Abuse Father     Other Father         myeloproliferative disorder    Substance Abuse Sister     Thyroid Disease Brother     Other Brother         hearing loss     No Known Problems Maternal Grandmother     Obesity Maternal Grandfather     Kidney Disease Maternal Grandfather     Breast Cancer Paternal Grandmother        Allergies:  Percocet [oxycodone-acetaminophen] and Vancomycin    Home Medications:  Prior to Admission medications    Medication Sig Start Date End Date Taking? Authorizing Provider   fluticasone (FLONASE) 50 MCG/ACT nasal spray 1 spray by Nasal route daily  Patient not taking: Reported on 11/9/2022 11/8/22   Historical Provider, MD   valACYclovir (VALTREX) 500 MG tablet Take 1 tablet by mouth daily  Patient not taking: Reported on 11/9/2022 8/29/22   Ria Livingston, APRN - CNP       REVIEW OF SYSTEMS    (2-9 systems for level 4, 10 or more for level 5)      Review of Systems   Constitutional:  Negative for chills and fever. HENT:  Negative for congestion. Respiratory:  Negative for shortness of breath. Cardiovascular:  Negative for chest pain and palpitations. Gastrointestinal:  Positive for nausea. Negative for abdominal pain, constipation, diarrhea and vomiting. Genitourinary:  Negative for dysuria and hematuria. Allergic/Immunologic: Negative for immunocompromised state. Neurological:  Negative for dizziness, light-headedness and headaches.    Hematological:  Does not bruise/bleed easily. PHYSICAL EXAM   (up to 7 for level 4, 8 or more for level 5)      INITIAL VITALS:   /83   Pulse 98   Temp 97.6 °F (36.4 °C) (Oral)   Resp 18   Ht 5' 6\" (1.676 m)   Wt 231 lb (104.8 kg)   SpO2 98%   BMI 37.28 kg/m²     Physical Exam  Constitutional:       General: She is not in acute distress. HENT:      Head: Atraumatic. Nose: Nose normal.      Mouth/Throat:      Mouth: Mucous membranes are moist.      Pharynx: Oropharynx is clear. Cardiovascular:      Rate and Rhythm: Normal rate and regular rhythm. Heart sounds: Normal heart sounds. Pulmonary:      Effort: Pulmonary effort is normal.      Breath sounds: Normal breath sounds. Abdominal:      General: There is no distension. Palpations: Abdomen is soft. Tenderness: There is no abdominal tenderness. There is no guarding. Comments: Surgical sites c/d/I, no erythema or drainage    Musculoskeletal:         General: Normal range of motion. Right lower leg: No edema. Left lower leg: No edema. Skin:     General: Skin is warm. Neurological:      General: No focal deficit present. Mental Status: She is alert. Psychiatric:         Mood and Affect: Mood normal.       DIFFERENTIAL  DIAGNOSIS     PLAN (LABS / IMAGING / EKG):  Orders Placed This Encounter   Procedures    Inpatient Consult to Bariatrics       MEDICATIONS ORDERED:  Orders Placed This Encounter   Medications    sodium chloride 0.9 % 2,031 mL with folic acid 1 mg, adult multi-vitamin with vitamin k 10 mL, thiamine 100 mg       DDX: Patient presents s/p gastric sleve on 11/1/22 with tachycardia. She states she was sitting on her couch when her apple watch alerted her that she had a HR >120 for 20 mins. She states she used the EKG function on the watch and at the time her HR was elevated she had been standing. She denies chest pain, palpitations, shortness of breath. She has been having regular Bms and passing flatus.  No fever or chills, no abd pain. She states she has been urinating without difficulty and did not feel dehydrated. She does state she has only been drink 1 of the 2 shakes she is supposed to drink and has been tolerating only 45-50cc of fluid at a time, not the 60cc she should be taking. Orthostatics positive during exam, EKG normal. Will consult bariatrics to let them know she is here and start IVF in the meantime. Anticipate discharge to home. DIAGNOSTIC RESULTS / EMERGENCY DEPARTMENT COURSE / MDM   LAB RESULTS:  No results found for this visit on 11/22/22. IMPRESSION: Tachycardia likely related to mild dehydration. Will discharge after fluid bolus and recommend follow up with Dr. Kaushal Ding. RADIOLOGY:  No orders to display         EKG  EKG Interpretation    Interpreted by emergency department physician    Rhythm: normal sinus   Rate: normal  Axis: normal  Ectopy: none  Conduction: normal  ST Segments: normal  T Waves: normal  Q Waves: none    Clinical Impression: normal EKG    Severiano Palma, DO      All EKG's are interpreted by the Emergency Department Physician who either signs or Co-signs this chart in the absence of a cardiologist.    EMERGENCY DEPARTMENT COURSE:      ED Course as of 11/22/22 2201 Tue Nov 22, 2022 2138 Discussed with Dr. Kaushal Ding who is OK with IVF and follow up in office. [SK]      ED Course User Index  [SK] Severiano Palma, DO       No notes of EC Admission Criteria type on file. PROCEDURES:      CONSULTS:  IP CONSULT TO BARIATRICS    CRITICAL CARE:      FINAL IMPRESSION      1.  Tachycardia          DISPOSITION / PLAN     DISPOSITION    Disposition pending     PATIENT REFERRED TO:  Wandy Garcia DO  [address] [place], [place] [id number] [phone]          DISCHARGE MEDICATIONS:  New Prescriptions    No medications on file       Severiano Palma, DO  Emergency Medicine Resident    (Please note that portions of thisnote were completed with a voice recognition program.  Efforts were made to edit the dictations but occasionally words are mis-transcribed.)       Leann Nelson DO  Resident  11/22/22 6702

## 2022-11-23 NOTE — TELEPHONE ENCOUNTER
Reason for Disposition   [1] Caller has URGENT question AND [2] triager unable to answer question    Answer Assessment - Initial Assessment Questions  1. SYMPTOM: \"What's the main symptom you're concerned about? \" (e.g., pain, fever, vomiting)      Patient has no symptoms but had an alert from her apple watch about an elevated heart rate. Patient states she took an EKG from her apple watch and shows a resting heart rate of 142 for 15mins. Patient states she feel fine. No fever  2. ONSET: \"When did   start? \"      7:05-7:20pm when she got the alert  3. SURGERY: \"What surgery did you have? \"      Gastric sleeve  4. DATE of SURGERY: \"When was the surgery? \"       Nov 1, 2022  5. ANESTHESIA: \" What type of anesthesia did you have? \" (e.g., general, spinal, epidural, local)      General  6. PAIN: \"Is there any pain? \" If Yes, ask: \"How bad is it? \"  (Scale 1-10; or mild, moderate, severe)      Denies  7. FEVER: \"Do you have a fever? \" If Yes, ask: \"What is your temperature, how was it measured, and when did it start? \"      Denies, temps is 97.1F, measured orally  8. VOMITING: \"Is there any vomiting? \" If Yes, ask: \"How many times? \"      Denies  9. BLEEDING: \"Is there any bleeding? \" If Yes, ask: \"How much? \" and \"Where? \"      Denies  10. OTHER SYMPTOMS: \"Do you have any other symptoms? \" (e.g., drainage from wound, painful urination, constipation)  Denies any other symptoms. Patient states she is concern about the heart rate. Patient states she does not have any symptoms and did not even know about the heart rate until she got the alert from her apple watch.     Protocols used: Post-Op Symptoms and Questions-Formerly [facility], 1928–1983

## 2022-11-23 NOTE — ED PROVIDER NOTES
[facility]  Emergency Department  Faculty Attestation     I performed a history and physical examination of the patient and discussed management with the resident. I reviewed the residents note and agree with the documented findings and plan of care. Any areas of disagreement are noted on the chart. I was personally present for the key portions of any procedures. I have documented in the chart those procedures where I was not present during the key portions. I have reviewed the emergency nurses triage note. I agree with the chief complaint, past medical history, past surgical history, allergies, medications, social and family history as documented unless otherwise noted below. For Physician Assistant/ Nurse Practitioner cases/documentation I have personally evaluated this patient and have completed at least one if not all key elements of the E/M (history, physical exam, and MDM). Additional findings are as noted. Primary Care Physician:  GRACE Geller - CNP    Screenings:  [unfilled]    CHIEF COMPLAINT       Chief Complaint   Patient presents with    Tachycardia       RECENT VITALS:   Temp: 97.6 °F (36.4 °C),  Heart Rate: 98, Resp: 18, BP: 123/83    LABS:  Labs Reviewed - No data to display    Radiology  No orders to display       CRITICAL CARE: There was a high probability of clinically significant/life threatening deterioration in this patient's condition which required my urgent intervention. Total critical care time was none minutes. This excludes any time for separately reportable procedures. EKG: Interpreted by me    Rhythm: normal sinus   Rate: normal  Axis: normal  Ectopy: none  Conduction: normal  ST Segments: no acute change  T Waves: no acute change  Q Waves: none    Clinical Impression: no acute changes and normal EKG    Attending Physician Additional  Notes    Patient is 3-week status post gastric sleeve surgery.   She has been doing fine, advancing oral hydration then noticed today she had a an alarm from her smart watch indicating tachycardia with a heart rate of 140. This occurred when she was standing. She has no syncope, no fever chills or sweats. No chest pain shortness of breath. No cough sputum hemoptysis. No leg pain calf swelling mobility. No abdominal pain. No blood in the stools. She does have a mild scratchy throat. She is vaccinated for COVID. Her 11year-old goes to school and might approximately home on exam she is nontoxic, borderline tachycardia, the vital signs are normal.  No respiratory distress. Abdomen is benign. Oropharynx is minimally injected, normal tonsils. No edema cords Homans or calf tenderness. Impression is tachycardia likely from volume depletion, less likely viral syndrome. Plan is labs, IV fluids, consultation with bariatric team.  EKG Interpretation              Dionicio Vasquez.  Bandar Martinez MD, [address]  Attending Emergency  Physician                Sukhdev Salas MD  11/22/22 2118       Sukhdev Salas MD  11/22/22 2118

## 2022-11-25 LAB
EKG ATRIAL RATE: 88 BPM
EKG P AXIS: 52 DEGREES
EKG P-R INTERVAL: 128 MS
EKG Q-T INTERVAL: 380 MS
EKG QRS DURATION: 72 MS
EKG QTC CALCULATION (BAZETT): 459 MS
EKG R AXIS: 58 DEGREES
EKG T AXIS: 26 DEGREES
EKG VENTRICULAR RATE: 88 BPM

## 2022-11-25 PROCEDURE — 93010 ELECTROCARDIOGRAM REPORT: CPT | Performed by: INTERNAL MEDICINE

## 2022-12-05 ENCOUNTER — OFFICE VISIT (OUTPATIENT)
Dept: BARIATRICS/WEIGHT MGMT | Age: 32
End: 2022-12-05

## 2022-12-05 VITALS
HEIGHT: 66 IN | SYSTOLIC BLOOD PRESSURE: 128 MMHG | BODY MASS INDEX: 36.32 KG/M2 | WEIGHT: 226 LBS | DIASTOLIC BLOOD PRESSURE: 72 MMHG | RESPIRATION RATE: 20 BRPM | HEART RATE: 76 BPM

## 2022-12-05 DIAGNOSIS — D50.9 IRON DEFICIENCY ANEMIA, UNSPECIFIED IRON DEFICIENCY ANEMIA TYPE: Primary | ICD-10-CM

## 2022-12-05 DIAGNOSIS — Z98.84 S/P LAPAROSCOPIC SLEEVE GASTRECTOMY: ICD-10-CM

## 2022-12-05 DIAGNOSIS — R73.03 PREDIABETES: ICD-10-CM

## 2022-12-05 DIAGNOSIS — E66.9 OBESITY (BMI 30-39.9): ICD-10-CM

## 2022-12-05 PROCEDURE — 99024 POSTOP FOLLOW-UP VISIT: CPT | Performed by: NURSE PRACTITIONER

## 2022-12-05 RX ORDER — M-VIT,TX,IRON,MINS/CALC/FOLIC 27MG-0.4MG
1 TABLET ORAL DAILY
COMMUNITY

## 2022-12-05 NOTE — PROGRESS NOTES
Medical Nutrition Therapy  Metabolic and Bariatric surgery  3 month follow up        Pt reports:         Vitals:   Vitals:    12/05/22 0916   BP: 128/72   Site: Right Upper Arm   Position: Sitting   Cuff Size: Large Adult   Pulse: 76   Resp: 20   Weight: 226 lb (102.5 kg)   Height: 5' 6\" (1.676 m)      Body mass index is 36.48 kg/m². Labs reviewed:     Multivitamin/mineral intake:1  Calcium intake:   2  Other:            Nutrition Assessment:   PES: Inadequate food and beverage intake r/t WLS as evidenced by loss of excess body weight 23lb. Goals   60-80gm of protein  48-64oz of fluid  Vitamin adherance  Basic adherance to WLS behavious and this document has been scanned into the chart.        [] met     []  Not met        Plan:   F/u 6 months after surgery         Pao Cooney, RD, LD

## 2022-12-05 NOTE — PROGRESS NOTES
Post-op Bariatric Surgery Note    Subjective     Patient is 1 month s/p laparoscopic sleeve gastrectomy, down 23 lbs. Overall, doing well. Incisions well healed. Consistent use of MVI and calcium. Tolerating po intake. Getting adequate fluids and protein intake. Bowel function normal.  Physical activity includes treadmill. No pain or other specific problems or concerns. Allergies: Allergies   Allergen Reactions    Percocet [Oxycodone-Acetaminophen] Nausea And Vomiting     Other reaction(s): severe Nausea And Vomiting; syncope    Vancomycin Hives and Itching       Past Medical History:     Past Medical History:   Diagnosis Date    Abnormal Pap smear of cervix     Chronic constipation     COVID-19 09/23/2022    fatigue, body aches x 3 days; cough x 5 days    Heartburn     occasional    History of anal fissures     History of hemorrhoids     HPV in female     HSV infection     IBS (irritable bowel syndrome)     Obesity     Prediabetes     no Rx required, borderline    Wellness examination     TEDDY Rojas; has appt 10/25/22 for clearance   .     Past Surgical History:  Past Surgical History:   Procedure Laterality Date    COLONOSCOPY  10/2020    COLPOSCOPY      HEMORRHOID SURGERY      banding; no anesthesia used    SLEEVE GASTRECTOMY  11/01/2022    ROBOTIC LAPAROSCOPIC GASTRECTOMY SLEEVE, EGD    SLEEVE GASTRECTOMY N/A 11/1/2022    XI ROBOTIC LAPAROSCOPIC GASTRECTOMY SLEEVE, EGD - GI [facility]EDUL[facility] [address]    UPPER GASTROINTESTINAL ENDOSCOPY N/A 08/19/2022    EGD BIOPSY performed by Kahlil Conrad DO at [address] EXTRACTION      2010       Family History:  Family History   Problem Relation Age of Onset    Diabetes Mother         borderline     Obesity Mother     Cervical Cancer Mother 37    Cancer Father     Alcohol Abuse Father     Other Father         myeloproliferative disorder    Substance Abuse Sister     Thyroid Disease Brother     Other Brother         hearing loss     No Known Problems Maternal Grandmother     Obesity Maternal Grandfather     Kidney Disease Maternal Grandfather     Breast Cancer Paternal Grandmother        Social History:  Social History     Socioeconomic History    Marital status: Single     Spouse name: Not on file    Number of children: Not on file    Years of education: Not on file    Highest education level: Not on file   Occupational History    Not on file   Tobacco Use    Smoking status: Never    Smokeless tobacco: Never   Vaping Use    Vaping Use: Never used   Substance and Sexual Activity    Alcohol use: Not Currently    Drug use: No    Sexual activity: Not Currently     Partners: Male     Birth control/protection: I.U.D.    Other Topics Concern    Not on file   Social History Narrative    Not on file     Social Determinants of Health     Financial Resource Strain: Low Risk     Difficulty of Paying Living Expenses: Not hard at all   Food Insecurity: No Food Insecurity    Worried About Running Out of Food in the Last Year: Never true    Ran Out of Food in the Last Year: Never true   Transportation Needs: Not on file   Physical Activity: Not on file   Stress: Not on file   Social Connections: Not on file   Intimate Partner Violence: Not on file   Housing Stability: Not on file       Current Medications:  Current Outpatient Medications   Medication Sig Dispense Refill    Multiple Vitamins-Minerals (THERAPEUTIC MULTIVITAMIN-MINERALS) tablet Take 1 tablet by mouth daily Barilife bariatric MVI w/iron      Calcium Citrate-Vitamin D (CALCIUM CITRATE CHEWY BITE PO) Take 500 mg by mouth Batriatric Fusion chewable      Polyethylene Glycol 3350 (MIRALAX PO) Take by mouth      valACYclovir (VALTREX) 500 MG tablet Take 1 tablet by mouth daily 30 tablet 3    fluticasone (FLONASE) 50 MCG/ACT nasal spray 1 spray by Nasal route daily (Patient not taking: Reported on 11/9/2022)       Current Facility-Administered Medications   Medication Dose Route Frequency Provider Last Rate Last Admin    levonorgestrel (MIRENA) IUD 52 mg 1 each  1 each IntraUTERine Once Ev Shin, APRN - CNP           Vital Signs:  /72 (Site: Right Upper Arm, Position: Sitting, Cuff Size: Large Adult)   Pulse 76   Resp 20   Ht 5' 6\" (1.676 m)   Wt 226 lb (102.5 kg)   BMI 36.48 kg/m²     BMI/Height/Weight:  Body mass index is 36.48 kg/m². Review of Systems - A review of systems was performed. All was negative unless otherwise documented in HPI. Constitutional: Negative for fever, chills. HENT: Negative for trouble swallowing. Eyes: Negative for visual disturbance. Respiratory: Negative for cough, shortness of breath. Cardiovascular: Negative for chest pain and palpitations. Gastrointestinal: Negative for nausea, vomiting, abdominal pain, diarrhea, constipation, blood in stool and abdominal distention. Endocrine: Negative for polydipsia, polyphagia and polyuria. Genitourinary: Negative for dysuria, frequency, hematuria and difficulty urinating. Musculoskeletal: Negative for myalgias, joint swelling. Skin: Negative for pallor and rash. Neurological: Negative for dizziness, tremors, light-headedness and headaches. Psychiatric/Behavioral: Negative for sleep disturbance and dysphoric mood. Objective:      Physical Exam   Vital signs reviewed. General: Well-developed and well-nourished. No acute distress. Skin: Warm, dry and intact. HEENT: Normocephalic. EOMs intact. Conjunctivae normal. Neck supple. Cardiovascular: Normal rate, regular rhythm. Pulmonary/Chest: Normal effort. Lungs clear to auscultation. No rales, rhonchi or wheezing. Abdominal: Positive bowel sounds. Soft, nontender. Nondistended. No rigidity, rebound, or guarding. Incisions well-healed. Musculoskeletal: Movement x4. No edema.   Neurological: Gait normal. Alert and oriented to person, place, and time. Psychiatric: Normal mood and affect. Speech and behavior normal. Judgment and thought content normal. Cognition and memory intact. Assessment:       Diagnosis Orders   1. Iron deficiency anemia, unspecified iron deficiency anemia type  Comprehensive Metabolic Panel    TSH    T4, Free    Hemoglobin A1C    Vitamin B12 & Folate    Vitamin B1    Vitamin D 25 Hydroxy    Magnesium    Iron and TIBC    Vitamin A    Lipid Panel    PTH, Intact    CBC with Auto Differential    Zinc    Ferritin      2. Prediabetes  Comprehensive Metabolic Panel    TSH    T4, Free    Hemoglobin A1C    Vitamin B12 & Folate    Vitamin B1    Vitamin D 25 Hydroxy    Magnesium    Iron and TIBC    Vitamin A    Lipid Panel    PTH, Intact    CBC with Auto Differential    Zinc    Ferritin      3. S/P laparoscopic sleeve gastrectomy  Comprehensive Metabolic Panel    TSH    T4, Free    Hemoglobin A1C    Vitamin B12 & Folate    Vitamin B1    Vitamin D 25 Hydroxy    Magnesium    Iron and TIBC    Vitamin A    Lipid Panel    PTH, Intact    CBC with Auto Differential    Zinc    Ferritin      4. Obesity (BMI 30-39. 9)  Comprehensive Metabolic Panel    TSH    T4, Free    Hemoglobin A1C    Vitamin B12 & Folate    Vitamin B1    Vitamin D 25 Hydroxy    Magnesium    Iron and TIBC    Vitamin A    Lipid Panel    PTH, Intact    CBC with Auto Differential    Zinc    Ferritin          Plan:    Dietitian visit today. Patient to continue to increase protein to obtain 60-80g/day, at least 48-64oz of fluid daily, and gradually increase exercise regimen. Bariatric post-op labs ordered. Follow-up  Return in about 2 years (around 12/5/2024).     Orders this encounter:  Orders Placed This Encounter   Procedures    Comprehensive Metabolic Panel     Standing Status:   Future     Standing Expiration Date:   12/5/2023    TSH     Standing Status:   Future     Standing Expiration Date:   12/5/2023    T4, Free     Standing Status:   Future     Standing Expiration Date:   12/5/2023    Hemoglobin A1C     Standing Status:   Future     Standing Expiration Date:   12/5/2023    Vitamin B12 & Folate     Standing Status:   Future     Standing Expiration Date:   12/5/2023    Vitamin B1     Standing Status:   Future     Standing Expiration Date:   12/5/2023    Vitamin D 25 Hydroxy     Standing Status:   Future     Standing Expiration Date:   12/5/2023    Magnesium     Standing Status:   Future     Standing Expiration Date:   12/5/2023    Iron and TIBC     Standing Status:   Future     Standing Expiration Date:   12/5/2023     Order Specific Question:   Is Patient Fasting? Answer:   Yes     Order Specific Question:   No of Hours? Answer:   12 hours    Vitamin A     Standing Status:   Future     Standing Expiration Date:   12/5/2023    Lipid Panel     Standing Status:   Future     Standing Expiration Date:   12/5/2023     Order Specific Question:   Is Patient Fasting?/# of Hours     Answer:   12 hrs    PTH, Intact     Standing Status:   Future     Standing Expiration Date:   12/5/2023    CBC with Auto Differential     Standing Status:   Future     Standing Expiration Date:   12/5/2023    Zinc     Standing Status:   Future     Standing Expiration Date:   12/5/2023    Ferritin     Standing Status:   Future     Standing Expiration Date:   12/5/2023       Prescriptions this encounter:  No orders of the defined types were placed in this encounter.       Electronically signed by:  Major Villanueva CNP

## 2023-02-15 ENCOUNTER — HOSPITAL ENCOUNTER (OUTPATIENT)
Age: 33
Setting detail: SPECIMEN
Discharge: HOME | End: 2023-02-15
Payer: OTHER GOVERNMENT

## 2023-02-15 DIAGNOSIS — R73.03 PREDIABETES: ICD-10-CM

## 2023-02-15 DIAGNOSIS — Z98.84 S/P LAPAROSCOPIC SLEEVE GASTRECTOMY: ICD-10-CM

## 2023-02-15 DIAGNOSIS — D50.9 IRON DEFICIENCY ANEMIA, UNSPECIFIED IRON DEFICIENCY ANEMIA TYPE: ICD-10-CM

## 2023-02-15 DIAGNOSIS — E66.9 OBESITY (BMI 30-39.9): ICD-10-CM

## 2023-02-15 LAB
25(OH)D3 SERPL-MCNC: 33 NG/ML
ABSOLUTE EOS #: 0.17 K/UL (ref 0–0.44)
ABSOLUTE IMMATURE GRANULOCYTE: <0.03 K/UL (ref 0–0.3)
ABSOLUTE LYMPH #: 2.5 K/UL (ref 1.1–3.7)
ABSOLUTE MONO #: 0.66 K/UL (ref 0.1–1.2)
ALBUMIN SERPL-MCNC: 4.5 G/DL (ref 3.5–5.2)
ALBUMIN/GLOBULIN RATIO: 1.7 (ref 1–2.5)
ALP SERPL-CCNC: 81 U/L (ref 35–104)
ALT SERPL-CCNC: 25 U/L (ref 5–33)
ANION GAP SERPL CALCULATED.3IONS-SCNC: 14 MMOL/L (ref 9–17)
AST SERPL-CCNC: 20 U/L
BASOPHILS # BLD: 1 % (ref 0–2)
BASOPHILS ABSOLUTE: 0.05 K/UL (ref 0–0.2)
BILIRUB SERPL-MCNC: 0.3 MG/DL (ref 0.3–1.2)
BUN SERPL-MCNC: 13 MG/DL (ref 6–20)
CALCIUM SERPL-MCNC: 9.6 MG/DL (ref 8.6–10.4)
CHLORIDE SERPL-SCNC: 104 MMOL/L (ref 98–107)
CO2 SERPL-SCNC: 22 MMOL/L (ref 20–31)
CREAT SERPL-MCNC: 0.75 MG/DL (ref 0.5–0.9)
EOSINOPHILS RELATIVE PERCENT: 2 % (ref 1–4)
FERRITIN SERPL-MCNC: 253 NG/ML (ref 13–150)
FOLATE SERPL-MCNC: 5.7 NG/ML
GFR SERPL CREATININE-BSD FRML MDRD: >60 ML/MIN/1.73M2
GLUCOSE SERPL-MCNC: 82 MG/DL (ref 70–99)
HCT VFR BLD AUTO: 44.3 % (ref 36.3–47.1)
HGB BLD-MCNC: 14.4 G/DL (ref 11.9–15.1)
IMMATURE GRANULOCYTES: 0 %
IRON SATURATION: 17 % (ref 20–55)
IRON SERPL-MCNC: 47 UG/DL (ref 37–145)
LYMPHOCYTES # BLD: 30 % (ref 24–43)
MAGNESIUM SERPL-MCNC: 2.3 MG/DL (ref 1.6–2.6)
MCH RBC QN AUTO: 28.5 PG (ref 25.2–33.5)
MCHC RBC AUTO-ENTMCNC: 32.5 G/DL (ref 28.4–34.8)
MCV RBC AUTO: 87.7 FL (ref 82.6–102.9)
MONOCYTES # BLD: 8 % (ref 3–12)
NRBC AUTOMATED: 0 PER 100 WBC
PDW BLD-RTO: 13.2 % (ref 11.8–14.4)
PLATELET # BLD AUTO: 243 K/UL (ref 138–453)
PMV BLD AUTO: 11.9 FL (ref 8.1–13.5)
POTASSIUM SERPL-SCNC: 4.1 MMOL/L (ref 3.7–5.3)
PROT SERPL-MCNC: 7.2 G/DL (ref 6.4–8.3)
PTH-INTACT SERPL-MCNC: 53.3 PG/ML (ref 14–72)
RBC # BLD: 5.05 M/UL (ref 3.95–5.11)
SEG NEUTROPHILS: 59 % (ref 36–65)
SEGMENTED NEUTROPHILS ABSOLUTE COUNT: 4.99 K/UL (ref 1.5–8.1)
SODIUM SERPL-SCNC: 140 MMOL/L (ref 135–144)
T4 FREE SERPL-MCNC: 1.24 NG/DL (ref 0.93–1.7)
TIBC SERPL-MCNC: 269 UG/DL (ref 250–450)
TSH SERPL-ACNC: 1.42 UIU/ML (ref 0.3–5)
UNSATURATED IRON BINDING CAPACITY: 222 UG/DL (ref 112–347)
VIT B12 SERPL-MCNC: 636 PG/ML (ref 232–1245)
WBC # BLD AUTO: 8.4 K/UL (ref 3.5–11.3)

## 2023-02-15 PROCEDURE — 82746 ASSAY OF FOLIC ACID SERUM: CPT

## 2023-02-15 PROCEDURE — 84590 ASSAY OF VITAMIN A: CPT

## 2023-02-15 PROCEDURE — 83735 ASSAY OF MAGNESIUM: CPT

## 2023-02-15 PROCEDURE — 85025 COMPLETE CBC W/AUTO DIFF WBC: CPT

## 2023-02-15 PROCEDURE — 82607 VITAMIN B-12: CPT

## 2023-02-15 PROCEDURE — 83036 HEMOGLOBIN GLYCOSYLATED A1C: CPT

## 2023-02-15 PROCEDURE — 83540 ASSAY OF IRON: CPT

## 2023-02-15 PROCEDURE — 82728 ASSAY OF FERRITIN: CPT

## 2023-02-15 PROCEDURE — 36415 COLL VENOUS BLD VENIPUNCTURE: CPT

## 2023-02-15 PROCEDURE — 82306 VITAMIN D 25 HYDROXY: CPT

## 2023-02-15 PROCEDURE — 83550 IRON BINDING TEST: CPT

## 2023-02-15 PROCEDURE — 83970 ASSAY OF PARATHORMONE: CPT

## 2023-02-15 PROCEDURE — 84439 ASSAY OF FREE THYROXINE: CPT

## 2023-02-15 PROCEDURE — 84425 ASSAY OF VITAMIN B-1: CPT

## 2023-02-15 PROCEDURE — 80053 COMPREHEN METABOLIC PANEL: CPT

## 2023-02-15 PROCEDURE — 84630 ASSAY OF ZINC: CPT

## 2023-02-15 PROCEDURE — 84443 ASSAY THYROID STIM HORMONE: CPT

## 2023-02-16 LAB
EST. AVERAGE GLUCOSE BLD GHB EST-MCNC: 100 MG/DL
HBA1C MFR BLD: 5.1 % (ref 4–6)

## 2023-02-17 LAB
REASON FOR REJECTION: NORMAL
ZZ NTE CLEAN UP: ORDERED TEST: NORMAL
ZZ NTE WITH NAME CLEAN UP: SPECIMEN SOURCE: NORMAL

## 2023-02-18 ENCOUNTER — HOSPITAL ENCOUNTER (OUTPATIENT)
Age: 33
Discharge: HOME | End: 2023-02-18
Payer: OTHER GOVERNMENT

## 2023-02-18 DIAGNOSIS — D50.9 IRON DEFICIENCY ANEMIA, UNSPECIFIED IRON DEFICIENCY ANEMIA TYPE: ICD-10-CM

## 2023-02-18 DIAGNOSIS — Z98.84 S/P LAPAROSCOPIC SLEEVE GASTRECTOMY: ICD-10-CM

## 2023-02-18 DIAGNOSIS — E66.9 OBESITY (BMI 30-39.9): ICD-10-CM

## 2023-02-18 DIAGNOSIS — R73.03 PREDIABETES: ICD-10-CM

## 2023-02-18 LAB
CHOLEST SERPL-MCNC: 109 MG/DL
CHOLESTEROL/HDL RATIO: 3.4
HDLC SERPL-MCNC: 32 MG/DL
LDLC SERPL CALC-MCNC: 67 MG/DL (ref 0–130)
RETINYL PALMITATE: <0.02 MG/L (ref 0–0.1)
TRIGL SERPL-MCNC: 51 MG/DL
VITAMIN A LEVEL: 0.51 MG/L (ref 0.3–1.2)
VITAMIN A, INTERP: NORMAL

## 2023-02-18 PROCEDURE — 80061 LIPID PANEL: CPT

## 2023-02-18 PROCEDURE — 36415 COLL VENOUS BLD VENIPUNCTURE: CPT

## 2023-02-19 LAB — VIT B1 PYROPHOSHATE BLD-SCNC: 112 NMOL/L (ref 70–180)

## 2023-02-20 ENCOUNTER — OFFICE VISIT (OUTPATIENT)
Dept: BARIATRICS/WEIGHT MGMT | Age: 33
End: 2023-02-20
Payer: OTHER GOVERNMENT

## 2023-02-20 VITALS
BODY MASS INDEX: 31.98 KG/M2 | DIASTOLIC BLOOD PRESSURE: 68 MMHG | WEIGHT: 199 LBS | HEIGHT: 66 IN | SYSTOLIC BLOOD PRESSURE: 94 MMHG | HEART RATE: 99 BPM

## 2023-02-20 DIAGNOSIS — Z86.2 HISTORY OF ANEMIA: ICD-10-CM

## 2023-02-20 DIAGNOSIS — E66.9 OBESITY (BMI 30-39.9): ICD-10-CM

## 2023-02-20 DIAGNOSIS — K21.9 GASTROESOPHAGEAL REFLUX DISEASE WITHOUT ESOPHAGITIS: ICD-10-CM

## 2023-02-20 DIAGNOSIS — Z98.84 S/P LAPAROSCOPIC SLEEVE GASTRECTOMY: ICD-10-CM

## 2023-02-20 DIAGNOSIS — R73.03 PREDIABETES: Primary | ICD-10-CM

## 2023-02-20 DIAGNOSIS — K58.8 OTHER IRRITABLE BOWEL SYNDROME: ICD-10-CM

## 2023-02-20 PROCEDURE — 99213 OFFICE O/P EST LOW 20 MIN: CPT | Performed by: NURSE PRACTITIONER

## 2023-02-20 NOTE — PROGRESS NOTES
Post-op Bariatric Surgery Note    Subjective     Patient is 3 months s/p laparoscopic sleeve gastrectomy, down 50 lbs. Overall, doing well. Incisions well healed. Consistent use of MVI and calcium. Tolerating po intake. Getting adequate fluids and protein intake. Bowel function normal.  Physical activity includes light activity. No pain or other specific problems or concerns. Allergies: Allergies   Allergen Reactions    Percocet [Oxycodone-Acetaminophen] Nausea And Vomiting     Other reaction(s): severe Nausea And Vomiting; syncope    Vancomycin Hives and Itching       Past Medical History:     Past Medical History:   Diagnosis Date    Abnormal Pap smear of cervix     Chronic constipation     COVID-19 09/23/2022    fatigue, body aches x 3 days; cough x 5 days    Heartburn     occasional    History of anal fissures     History of hemorrhoids     HPV in female     HSV infection     IBS (irritable bowel syndrome)     Obesity     Prediabetes     no Rx required, borderline    Wellness examination     CNP Mariam Rojas; has appt 10/25/22 for clearance   .     Past Surgical History:  Past Surgical History:   Procedure Laterality Date    COLONOSCOPY  10/2020    COLPOSCOPY      HEMORRHOID SURGERY      banding; no anesthesia used    SLEEVE GASTRECTOMY  11/01/2022    ROBOTIC LAPAROSCOPIC GASTRECTOMY SLEEVE, EGD    SLEEVE GASTRECTOMY N/A 11/1/2022    XI ROBOTIC LAPAROSCOPIC GASTRECTOMY SLEEVE, EGD - GI SCHEDULED performed by Carlos Sequeira DO at [facility] [address]    UPPER GASTROINTESTINAL ENDOSCOPY N/A 08/19/2022    EGD BIOPSY performed by Carlos Sequeira DO at [address] EXTRACTION      2010       Family History:  Family History   Problem Relation Age of Onset    Diabetes Mother         borderline     Obesity Mother     Cervical Cancer Mother 37    Cancer Father     Alcohol Abuse Father     Other Father         myeloproliferative disorder    Substance Abuse Sister     Thyroid Disease Brother     Other Brother         hearing loss     No Known Problems Maternal Grandmother     Obesity Maternal Grandfather     Kidney Disease Maternal Grandfather     Breast Cancer Paternal Grandmother        Social History:  Social History     Socioeconomic History    Marital status: Single     Spouse name: Not on file    Number of children: Not on file    Years of education: Not on file    Highest education level: Not on file   Occupational History    Not on file   Tobacco Use    Smoking status: Never    Smokeless tobacco: Never   Vaping Use    Vaping Use: Never used   Substance and Sexual Activity    Alcohol use: Not Currently    Drug use: No    Sexual activity: Not Currently     Partners: Male     Birth control/protection: I.U.D.    Other Topics Concern    Not on file   Social History Narrative    Not on file     Social Determinants of Health     Financial Resource Strain: Low Risk     Difficulty of Paying Living Expenses: Not hard at all   Food Insecurity: No Food Insecurity    Worried About Running Out of Food in the Last Year: Never true    Ran Out of Food in the Last Year: Never true   Transportation Needs: Not on file   Physical Activity: Not on file   Stress: Not on file   Social Connections: Not on file   Intimate Partner Violence: Not on file   Housing Stability: Not on file       Current Medications:  Current Outpatient Medications   Medication Sig Dispense Refill    Multiple Vitamins-Minerals (THERAPEUTIC MULTIVITAMIN-MINERALS) tablet Take 1 tablet by mouth daily Barilife bariatric MVI w/iron      Calcium Citrate-Vitamin D (CALCIUM CITRATE CHEWY BITE PO) Take 500 mg by mouth Batriatric Fusion chewable      Polyethylene Glycol 3350 (MIRALAX PO) Take by mouth      valACYclovir (VALTREX) 500 MG tablet Take 1 tablet by mouth daily 30 tablet 3    fluticasone (FLONASE) 50 MCG/ACT nasal spray 1 spray by Nasal route daily (Patient not taking: No sig reported)       Current Facility-Administered Medications   Medication Dose Route Frequency Provider Last Rate Last Admin    levonorgestrel (MIRENA) IUD 52 mg 1 each  1 each IntraUTERine Once GRACE Cheung - CNP           Vital Signs:  BP 94/68 (Site: Right Upper Arm, Position: Sitting, Cuff Size: Large Adult)   Pulse 99   Ht 5' 6\" (1.676 m)   Wt 199 lb (90.3 kg)   BMI 32.12 kg/m²     BMI/Height/Weight:  Body mass index is 32.12 kg/m². Review of Systems - A review of systems was performed. All was negative unless otherwise documented in HPI. Constitutional: Negative for fever, chills. HENT: Negative for trouble swallowing. Eyes: Negative for visual disturbance. Respiratory: Negative for cough, shortness of breath. Cardiovascular: Negative for chest pain and palpitations. Gastrointestinal: Negative for nausea, vomiting, abdominal pain, diarrhea, constipation, blood in stool and abdominal distention. Endocrine: Negative for polydipsia, polyphagia and polyuria. Genitourinary: Negative for dysuria, frequency, hematuria and difficulty urinating. Musculoskeletal: Negative for myalgias, joint swelling. Skin: Negative for pallor and rash. Neurological: Negative for dizziness, tremors, light-headedness and headaches. Psychiatric/Behavioral: Negative for sleep disturbance and dysphoric mood. Objective:      Physical Exam   Vital signs reviewed. General: Well-developed and well-nourished. No acute distress. Skin: Warm, dry and intact. HEENT: Normocephalic. EOMs intact. Conjunctivae normal. Neck supple. Cardiovascular: Normal rate, regular rhythm. Pulmonary/Chest: Normal effort. Lungs clear to auscultation. No rales, rhonchi or wheezing. Abdominal: Positive bowel sounds. Soft, nontender. Nondistended. No rigidity, rebound, or guarding. Incisions well-healed. Musculoskeletal: Movement x4. No edema. Neurological: Gait normal. Alert and oriented to person, place, and time. Psychiatric: Normal mood and affect. Speech and behavior normal. Judgment and thought content normal. Cognition and memory intact. Assessment:       Diagnosis Orders   1. Prediabetes  CBC with Auto Differential    Comprehensive Metabolic Panel    Ferritin    Hemoglobin A1C    Iron and TIBC    Lipid Panel    Magnesium    PTH, Intact    TSH    Vitamin A    Vitamin B1    Vitamin B12 & Folate    Vitamin D 25 Hydroxy    Zinc      2. Gastroesophageal reflux disease without esophagitis  CBC with Auto Differential    Comprehensive Metabolic Panel    Ferritin    Hemoglobin A1C    Iron and TIBC    Lipid Panel    Magnesium    PTH, Intact    TSH    Vitamin A    Vitamin B1    Vitamin B12 & Folate    Vitamin D 25 Hydroxy    Zinc      3. Obesity (BMI 30-39. 9)  CBC with Auto Differential    Comprehensive Metabolic Panel    Ferritin    Hemoglobin A1C    Iron and TIBC    Lipid Panel    Magnesium    PTH, Intact    TSH    Vitamin A    Vitamin B1    Vitamin B12 & Folate    Vitamin D 25 Hydroxy    Zinc      4. History of anemia  CBC with Auto Differential    Comprehensive Metabolic Panel    Ferritin    Hemoglobin A1C    Iron and TIBC    Lipid Panel    Magnesium    PTH, Intact    TSH    Vitamin A    Vitamin B1    Vitamin B12 & Folate    Vitamin D 25 Hydroxy    Zinc      5. S/P laparoscopic sleeve gastrectomy  CBC with Auto Differential    Comprehensive Metabolic Panel    Ferritin    Hemoglobin A1C    Iron and TIBC    Lipid Panel    Magnesium    PTH, Intact    TSH    Vitamin A    Vitamin B1    Vitamin B12 & Folate    Vitamin D 25 Hydroxy    Zinc      6. Other irritable bowel syndrome  CBC with Auto Differential    Comprehensive Metabolic Panel    Ferritin    Hemoglobin A1C    Iron and TIBC    Lipid Panel    Magnesium    PTH, Intact    TSH    Vitamin A    Vitamin B1    Vitamin B12 & Folate    Vitamin D 25 Hydroxy    Zinc          Plan:    Dietitian visit today.   Patient to continue to increase protein to obtain 60-80g/day, at least 48-64oz of fluid daily, and gradually increase exercise regimen. Labs from 2/18/23 reviewed and discussed with patient. Acceptable. Bariatric post-op labs ordered for next visit. Follow-up  Return in about 3 months (around 5/20/2023). Orders this encounter:  Orders Placed This Encounter   Procedures    CBC with Auto Differential     Standing Status:   Future     Standing Expiration Date:   2/20/2024    Comprehensive Metabolic Panel     Standing Status:   Future     Standing Expiration Date:   2/20/2024    Ferritin     Standing Status:   Future     Standing Expiration Date:   2/20/2024    Hemoglobin A1C     Standing Status:   Future     Standing Expiration Date:   2/20/2024    Iron and TIBC     Standing Status:   Future     Standing Expiration Date:   2/20/2024     Order Specific Question:   Is Patient Fasting? Answer:   no     Order Specific Question:   No of Hours? Answer:   0    Lipid Panel     Standing Status:   Future     Standing Expiration Date:   2/20/2024     Order Specific Question:   Is Patient Fasting?/# of Hours     Answer:   12    Magnesium     Standing Status:   Future     Standing Expiration Date:   2/20/2024    PTH, Intact     Standing Status:   Future     Standing Expiration Date:   2/20/2024    TSH     Standing Status:   Future     Standing Expiration Date:   2/20/2024    Vitamin A     Standing Status:   Future     Standing Expiration Date:   2/20/2024    Vitamin B1     Standing Status:   Future     Standing Expiration Date:   2/20/2024    Vitamin B12 & Folate     Standing Status:   Future     Standing Expiration Date:   2/20/2024    Vitamin D 25 Hydroxy     Standing Status:   Future     Standing Expiration Date:   2/20/2024    Zinc     Standing Status:   Future     Standing Expiration Date:   2/20/2024       Prescriptions this encounter:  No orders of the defined types were placed in this encounter.       Electronically signed by:  Zhou Ramirez CNP

## 2023-02-20 NOTE — PROGRESS NOTES
Medical Nutrition Therapy  Metabolic and Bariatric surgery  3 month follow up        Pt reports: She is doing well. Consistent use of MVI and calcium. Tolerating po intake. Getting adequate fluids and protein intake. No concerns today. Vitals:   Vitals:    02/20/23 0835   BP: 94/68   Site: Right Upper Arm   Position: Sitting   Cuff Size: Large Adult   Pulse: 99   Weight: 199 lb (90.3 kg)   Height: 5' 6\" (1.676 m)      Body mass index is 32.12 kg/m². Multivitamin/mineral intake: Barilife mvi with iron  Calcium intake:  750 mg of Ca 2x/day      Nutrition Assessment:   PES: Inadequate food and beverage intake r/t WLS as evidenced by loss of excess body weight lost 50 lbs over 3 months. Goals   60-80gm of protein  48-64oz of fluid  Vitamin adherance  Basic adherance to WLS behavious and this document has been scanned into the chart.        [x] met     []  Not met        Plan: F/u 6 months after surgery         David Mcguire, MS, RD, LD

## 2023-03-15 NOTE — PROGRESS NOTES
[address] [facility] MIN INVASIVE BARIATRIC SURG  [address]  [address]  [facility] [address]  Dept: [phone]    SURGICAL WEIGHT MANAGEMENT PROGRAM   PROGRESS NOTE - SURGICAL EVALUATION    CC: Weight Loss       Patient: Jai Tristan        Service Date: 10/27/2022      Medical Record #: [id number]    Patient History/Assessment Summary:    The patient is a pleasant 33y.o. year old female  with morbid obesity, who stands Height: 5' 6\" (167.6 cm) tall with a weight of Weight: 254 lb (115.2 kg) , resulting in a BMI of Body mass index is 41 kg/m². .     This patient is alone for the evaluation today. The patient is being evaluated to undergo weight loss surgery to treat the following comorbid conditions caused by her morbid obesity: GERD and prediabetes. The patient denies  a history of myocardial infarction, deep vein thrombosis, pulmonary embolism, renal failure, hepatic failure, and stroke. She attended the weight loss surgery seminar, and attended bariatric education    Last Visit Weight:   Wt Readings from Last 3 Encounters:   10/27/22 254 lb (115.2 kg)   10/25/22 258 lb (117 kg)   10/19/22 258 lb (117 kg)     Today's weight is decreased from the last visit. Highest Weight: 258 lbs    The patient is being evaluated for Laparoscopic Sleeve Gastrectomy. She  is here today to review the details of surgery. The patient acknowledges and understands the risks, benefits, and options we have discussed, as outlined in the Additional Informed Consent for this procedure. Patient also understands the importance of surgical and post-operative recommendations, including the operative diet and regular post-operative follow up care. The importance of ambulation and incentive spirometry was also discussed. All questions of this patient and any family members present have been answered to their satisfaction.     Physical Examination:     /81 (Site: Right Upper Arm, Patient calling to schedule procedure please advise on scheduling.    Open Access Colonoscopy ordered.  Last colonoscopy with Dr. Guzman on 12/21/2009.     Dx:      Screening for colorectal cancer      Comments: Patient has history of bad reaction to anesthesia, facial swelling and shaking      Position: Sitting, Cuff Size: Large Adult)   Pulse 86   Ht 5' 6\" (1.676 m)   Wt 254 lb (115.2 kg)   LMP  (LMP Unknown)   BMI 41.00 kg/m²   General This patient is awake, alert, and oriented, and is in no apparent distress. Cardiac Regular rate and rhythm without evidence of murmur. Respiratory Clear to auscultation bilaterally. Abdomen Obese, soft, non-tender, non-distended without masses/ No  evidence of abdominal hernia / Incisions consistent with previous surgeries. Head and Neck Obese, normocephalic and atraumatic/soft and supple, no  lymphadenopathy or obvious bruits. Extremeties No cyanosis, clubbing or edema/ No calf tenderness/No  restrictions of movement, is ambulatory without assistance. Neurological Intact x 4 extremities, no focal deficits noted   Skin No rashes or lesions noted   Rectal Deferred     RECOMMENDATIONS:       Diagnosis Orders   1. GERD without esophagitis        2. Prediabetes        3. Morbid obesity with BMI of 40.0-44.9, adult ([facility] [address]               We spent a great deal of time discussing the risks and benefits of Laparoscopic Sleeve Gastrectomy, including but not limited to injury to intra-abdominal organs, breakdown of the gastric staple line, the need for re-operative therapy,  prolonged hospitalization,  mechanical ventilation,  and death. We discussed the possibility of bleeding, the need for blood transfusions, blood clots, hospital-acquired and intra-abdominal infection, anastomotic stricture, and worsening GERD. And we discussed the need for post-operative visit compliance, behavior modifications and diet changes, protein and vitamin supplementation, as well as routine scheduled and dedicated exercise. We discussed the potential weight loss benefit of approximately 60-70% of her excess body weight at 12-18 months post-op, as well as the possibility of insufficient weight loss or weight gain after 2 years post-operative time.      The following was discussed with

## 2023-03-27 ENCOUNTER — TELEPHONE (OUTPATIENT)
Dept: BARIATRICS/WEIGHT MGMT | Age: 33
End: 2023-03-27

## 2023-03-27 NOTE — TELEPHONE ENCOUNTER
Advise patient to go to ER due to tachycardia and back pain. She can get fluids there and get work-up for other issues.

## 2023-03-27 NOTE — TELEPHONE ENCOUNTER
Next Visit Date:  Visit date not found    Patient Surgery Date  11/1/22    Type of  Surgery  sleeve    Patient calls complaining of: vomiting/diarrhea,heart rate 120, pain back L behind knee up to thigh area, no swelling/tenderness, no fever, symptoms started 3/21/23, appetite has decreased, she has 20oz fluid today, since Tuesday she is averaging 30-40 oz daily. States every time she eats she vomits. She would like fluids if possible. Onset: 7 day(s) ago  Timing: constant, intermittent  Severity: Mild    Progression: stable    Associated Symptoms: none  Any allergy  To medications: percocet and vancomycin    [facility]    Patient advised:   If  Symptoms worsen seek treatment at  Emergency Room. You will receive a call back from the office within 24-48 hours.     Is it ok to leave a message if we call back yes

## 2023-05-15 ENCOUNTER — HOSPITAL ENCOUNTER (OUTPATIENT)
Age: 33
Setting detail: SPECIMEN
Discharge: HOME | End: 2023-05-15

## 2023-05-15 DIAGNOSIS — Z86.2 HISTORY OF ANEMIA: ICD-10-CM

## 2023-05-15 DIAGNOSIS — E66.9 OBESITY (BMI 30-39.9): ICD-10-CM

## 2023-05-15 DIAGNOSIS — R73.03 PREDIABETES: ICD-10-CM

## 2023-05-15 DIAGNOSIS — Z98.84 S/P LAPAROSCOPIC SLEEVE GASTRECTOMY: ICD-10-CM

## 2023-05-15 DIAGNOSIS — K58.8 OTHER IRRITABLE BOWEL SYNDROME: ICD-10-CM

## 2023-05-15 DIAGNOSIS — K21.9 GASTROESOPHAGEAL REFLUX DISEASE WITHOUT ESOPHAGITIS: ICD-10-CM

## 2023-05-15 LAB
25(OH)D3 SERPL-MCNC: 30.7 NG/ML
ALBUMIN SERPL-MCNC: 4.6 G/DL (ref 3.5–5.2)
ALBUMIN/GLOB SERPL: 1.8 {RATIO} (ref 1–2.5)
ALP SERPL-CCNC: 79 U/L (ref 35–104)
ALT SERPL-CCNC: 17 U/L (ref 5–33)
ANION GAP SERPL CALCULATED.3IONS-SCNC: 18 MMOL/L (ref 9–17)
AST SERPL-CCNC: 16 U/L
BASOPHILS # BLD: 0.05 K/UL (ref 0–0.2)
BASOPHILS # BLD: 1 % (ref 0–2)
BILIRUB SERPL-MCNC: 0.7 MG/DL (ref 0.3–1.2)
BUN SERPL-MCNC: 10 MG/DL (ref 6–20)
CALCIUM SERPL-MCNC: 9.5 MG/DL (ref 8.6–10.4)
CHLORIDE SERPL-SCNC: 105 MMOL/L (ref 98–107)
CHOLEST SERPL-MCNC: 117 MG/DL
CHOLESTEROL/HDL RATIO: 2.9
CO2 SERPL-SCNC: 19 MMOL/L (ref 20–31)
CREAT SERPL-MCNC: 0.77 MG/DL (ref 0.5–0.9)
EOSINOPHIL # BLD: 0.1 K/UL (ref 0–0.44)
EOSINOPHILS RELATIVE PERCENT: 2 % (ref 1–4)
ERYTHROCYTE [DISTWIDTH] IN BLOOD BY AUTOMATED COUNT: 12.8 % (ref 11.8–14.4)
EST. AVERAGE GLUCOSE BLD GHB EST-MCNC: 91 MG/DL
FERRITIN SERPL-MCNC: 284 NG/ML (ref 13–150)
GFR SERPL CREATININE-BSD FRML MDRD: >60 ML/MIN/1.73M2
GLUCOSE SERPL-MCNC: 71 MG/DL (ref 70–99)
HBA1C MFR BLD: 4.8 % (ref 4–6)
HCT VFR BLD AUTO: 43.3 % (ref 36.3–47.1)
HDLC SERPL-MCNC: 41 MG/DL
HGB BLD-MCNC: 13.8 G/DL (ref 11.9–15.1)
IMM GRANULOCYTES # BLD AUTO: <0.03 K/UL (ref 0–0.3)
IMM GRANULOCYTES NFR BLD: 0 %
IRON SATN MFR SERPL: 43 % (ref 20–55)
IRON SERPL-MCNC: 112 UG/DL (ref 37–145)
LDLC SERPL CALC-MCNC: 65 MG/DL (ref 0–130)
LYMPHOCYTES # BLD: 28 % (ref 24–43)
LYMPHOCYTES NFR BLD: 1.72 K/UL (ref 1.1–3.7)
MAGNESIUM SERPL-MCNC: 2.3 MG/DL (ref 1.6–2.6)
MCH RBC QN AUTO: 28.5 PG (ref 25.2–33.5)
MCHC RBC AUTO-ENTMCNC: 31.9 G/DL (ref 28.4–34.8)
MCV RBC AUTO: 89.5 FL (ref 82.6–102.9)
MONOCYTES NFR BLD: 0.41 K/UL (ref 0.1–1.2)
MONOCYTES NFR BLD: 7 % (ref 3–12)
NEUTROPHILS NFR BLD: 62 % (ref 36–65)
NEUTS SEG NFR BLD: 3.89 K/UL (ref 1.5–8.1)
NRBC AUTOMATED: 0 PER 100 WBC
PLATELET # BLD AUTO: 222 K/UL (ref 138–453)
PMV BLD AUTO: 11.9 FL (ref 8.1–13.5)
POTASSIUM SERPL-SCNC: 4.1 MMOL/L (ref 3.7–5.3)
PROT SERPL-MCNC: 7.2 G/DL (ref 6.4–8.3)
PTH-INTACT SERPL-MCNC: 30.3 PG/ML (ref 14–72)
RBC # BLD AUTO: 4.84 M/UL (ref 3.95–5.11)
SODIUM SERPL-SCNC: 142 MMOL/L (ref 135–144)
TIBC SERPL-MCNC: 262 UG/DL (ref 250–450)
TRIGL SERPL-MCNC: 56 MG/DL
TSH SERPL-ACNC: 0.86 UIU/ML (ref 0.3–5)
UNSATURATED IRON BINDING CAPACITY: 150 UG/DL (ref 112–347)
WBC OTHER # BLD: 6.2 K/UL (ref 3.5–11.3)

## 2023-05-16 DIAGNOSIS — E53.8 LOW FOLATE: Primary | ICD-10-CM

## 2023-05-16 LAB
FOLATE SERPL-MCNC: 3.8 NG/ML
VIT B12 SERPL-MCNC: 485 PG/ML (ref 232–1245)

## 2023-05-16 RX ORDER — FOLIC ACID 1 MG/1
1 TABLET ORAL DAILY
Qty: 30 TABLET | Refills: 0 | Status: SHIPPED | OUTPATIENT
Start: 2023-05-16

## 2023-05-17 LAB
RETINYL PALMITATE: <0.02 MG/L (ref 0–0.1)
VIT B1 PYROPHOSHATE BLD-SCNC: 102 NMOL/L (ref 70–180)
VITAMIN A LEVEL: 0.5 MG/L (ref 0.3–1.2)
VITAMIN A, INTERP: NORMAL
ZINC: 77.6 UG/DL (ref 60–120)

## 2023-05-22 ENCOUNTER — OFFICE VISIT (OUTPATIENT)
Dept: BARIATRICS/WEIGHT MGMT | Age: 33
End: 2023-05-22
Payer: OTHER GOVERNMENT

## 2023-05-22 VITALS
BODY MASS INDEX: 28.45 KG/M2 | SYSTOLIC BLOOD PRESSURE: 100 MMHG | HEART RATE: 70 BPM | HEIGHT: 66 IN | DIASTOLIC BLOOD PRESSURE: 70 MMHG | WEIGHT: 177 LBS

## 2023-05-22 DIAGNOSIS — R73.03 PREDIABETES: ICD-10-CM

## 2023-05-22 DIAGNOSIS — D50.9 IRON DEFICIENCY ANEMIA, UNSPECIFIED IRON DEFICIENCY ANEMIA TYPE: Primary | ICD-10-CM

## 2023-05-22 DIAGNOSIS — E66.3 OVERWEIGHT (BMI 25.0-29.9): ICD-10-CM

## 2023-05-22 DIAGNOSIS — K58.8 OTHER IRRITABLE BOWEL SYNDROME: ICD-10-CM

## 2023-05-22 DIAGNOSIS — Z98.84 S/P LAPAROSCOPIC SLEEVE GASTRECTOMY: ICD-10-CM

## 2023-05-22 DIAGNOSIS — K21.9 GASTROESOPHAGEAL REFLUX DISEASE WITHOUT ESOPHAGITIS: ICD-10-CM

## 2023-05-22 PROBLEM — E66.01 MORBID OBESITY WITH BMI OF 40.0-44.9, ADULT (HCC): Status: RESOLVED | Noted: 2022-07-18 | Resolved: 2023-05-22

## 2023-05-22 PROBLEM — E66.9 OBESITY (BMI 30-39.9): Status: RESOLVED | Noted: 2022-12-05 | Resolved: 2023-05-22

## 2023-05-22 PROBLEM — E66.01 MORBID OBESITY (HCC): Status: RESOLVED | Noted: 2022-11-10 | Resolved: 2023-05-22

## 2023-05-22 PROCEDURE — 99213 OFFICE O/P EST LOW 20 MIN: CPT | Performed by: NURSE PRACTITIONER

## 2023-05-22 NOTE — PROGRESS NOTES
Post-op Bariatric Surgery Note    Subjective     Patient is 6 months s/p laparoscopic sleeve gastrectomy, down 72 lbs. Overall, doing well. Incisions well healed. Consistent use of MVI and calcium. Tolerating po intake. Getting adequate fluids and protein intake. Bowel function normal.  Physical activity includes light activity. No pain or other specific problems or concerns. Allergies: Allergies   Allergen Reactions    Percocet [Oxycodone-Acetaminophen] Nausea And Vomiting     Other reaction(s): severe Nausea And Vomiting; syncope    Vancomycin Hives and Itching       Past Medical History:     Past Medical History:   Diagnosis Date    Abnormal Pap smear of cervix     Chronic constipation     COVID-19 09/23/2022    fatigue, body aches x 3 days; cough x 5 days    Heartburn     occasional    History of anal fissures     History of hemorrhoids     HPV in female     HSV infection     IBS (irritable bowel syndrome)     Obesity     Prediabetes     no Rx required, borderline    Wellness examination     CNP Mariam Rojas; has appt 10/25/22 for clearance   .     Past Surgical History:  Past Surgical History:   Procedure Laterality Date    COLONOSCOPY  10/2020    COLPOSCOPY      HEMORRHOID SURGERY      banding; no anesthesia used    SLEEVE GASTRECTOMY  11/01/2022    ROBOTIC LAPAROSCOPIC GASTRECTOMY SLEEVE, EGD    SLEEVE GASTRECTOMY N/A 11/1/2022    XI ROBOTIC LAPAROSCOPIC GASTRECTOMY SLEEVE, EGD - GI SCHEDULED performed by Robina Orona DO at [address]    UPPER GASTROINTESTINAL ENDOSCOPY N/A 08/19/2022    EGD BIOPSY performed by Robina Orona DO at [address] EXTRACTION      2010       Family History:  Family History   Problem Relation Age of Onset    Diabetes Mother         borderline     Obesity Mother     Cervical Cancer Mother 37    Cancer Father     Alcohol Abuse Father     Other Father         myeloproliferative

## 2023-07-13 ENCOUNTER — PROCEDURE VISIT (OUTPATIENT)
Dept: OBGYN CLINIC | Age: 33
End: 2023-07-13

## 2023-07-13 VITALS
WEIGHT: 169.8 LBS | SYSTOLIC BLOOD PRESSURE: 128 MMHG | HEIGHT: 66 IN | BODY MASS INDEX: 27.29 KG/M2 | DIASTOLIC BLOOD PRESSURE: 86 MMHG

## 2023-07-13 DIAGNOSIS — Z30.432 ENCOUNTER FOR IUD REMOVAL: Primary | ICD-10-CM

## 2023-07-13 NOTE — PROGRESS NOTES
Chaperone for Intimate Exam  Chaperone was offered as part of the rooming process. Patient declined and agrees to continue with exam without a chaperone.   Chaperone: NONE
bariatric MVI w/iron      Calcium Citrate-Vitamin D (CALCIUM CITRATE CHEWY BITE PO) Take 500 mg by mouth Batriatric Fusion chewable      Polyethylene Glycol 3350 (MIRALAX PO) Take by mouth      valACYclovir (VALTREX) 500 MG tablet Take 1 tablet by mouth daily 30 tablet 3     Current Facility-Administered Medications   Medication Dose Route Frequency Provider Last Rate Last Admin    levonorgestrel (MIRENA) IUD 52 mg 1 each  1 each IntraUTERine Once GRACE Celis CNP             ALLERGIES:  Allergies as of 07/13/2023 - Fully Reviewed 07/13/2023   Allergen Reaction Noted    Percocet [oxycodone-acetaminophen] Nausea And Vomiting 10/13/2016    Vancomycin Hives and Itching 04/22/2019       PROCEDURE: IUD Removal  Pt was offered a chaperone in the room, and declined. Pt was educated about removal and elects to proceed  Speculum placed. Strings were visualized. Ring forceps  utilized  IUD Removed intact. Bleeding, infection, pain education given. Assessment/Plan   Diagnosis Orders   1. Encounter for IUD removal          1. Encounter for IUD removal       IUD removed easily.   Condoms encouraged until cycles return        Return in about 3 months (around 10/13/2023) for annual exam.        Electronically signed by GRACE Casas CNP 7/13/2023 2:33 PM

## 2023-07-31 ENCOUNTER — TELEPHONE (OUTPATIENT)
Dept: BARIATRICS/WEIGHT MGMT | Age: 33
End: 2023-07-31

## 2023-07-31 DIAGNOSIS — Z98.84 S/P LAPAROSCOPIC SLEEVE GASTRECTOMY: ICD-10-CM

## 2023-07-31 DIAGNOSIS — K21.9 GASTROESOPHAGEAL REFLUX DISEASE WITHOUT ESOPHAGITIS: ICD-10-CM

## 2023-07-31 DIAGNOSIS — E66.3 OVERWEIGHT (BMI 25.0-29.9): ICD-10-CM

## 2023-07-31 DIAGNOSIS — D50.9 IRON DEFICIENCY ANEMIA, UNSPECIFIED IRON DEFICIENCY ANEMIA TYPE: Primary | ICD-10-CM

## 2023-07-31 NOTE — TELEPHONE ENCOUNTER
She has upcoming appt 8/14/23 at which time we can explore the issue in more detail. For now, we can have the dietitian call her and advise dietary recommendations. If she is not getting adequate fluid intake, this might be contributing to the symptoms. She should strive for at least 64 oz per day, more if she is exerting or sweating. I ordered labs. Please encourage her to have them done before her upcoming appt. Please note that she had her IUD removed by OB/GYN recently and it was noted that the patient wishes to try to conceive. Please reinforce that it is a little early post-op to try to get pregnant. She should wait until at least 12-18 months post-op. She may also be having hormone fluctuations. Call if symptoms worsening.

## 2023-07-31 NOTE — TELEPHONE ENCOUNTER
Next Visit Date:  8/14/2023    Patient Surgery Date  11/01/2022    Type of  Surgery  sleeve    Patient calls complaining of  c/o getting the shakes before feeling hungry. Goes away after she eats. Onset: 4 day(s) ago  Timing: constant, intermittent - happening 2- 3 times a day sometimes. Patient states she is getting in enough protein. Eating breakfast, lunch and dinner w/snacks. States probably only getting 40 ounces of fluid in a day. Severity: Mild    Progression: increasing    Associated Symptoms: shakiness prior to the feeling getting hungry Goes away right after she eats something    Any allergy  To medications     Percocet/vancomycin    Current  Pharmacy   [address]    Patient advised:   If  Symptoms worsen seek treatment at  Emergency Room. You will receive a call back from the office within 24-48 hours.     Is it ok to leave a message if we call back yes

## 2023-07-31 NOTE — TELEPHONE ENCOUNTER
Patient reports typical intake of:  Breakfast: Over easy eggs + toast (typically doesn't eat the toast), greek yogurt + fruit,   Lunch: lean cuisine protein cheddar pasta  Dinner: typically protein only  Snack: cheese, P3 snack, cashews (not usually)    Patient reports she will have 3 meals + snacks throughout the day. Patient is drinking about 40 oz of water and drinks one cup of tea daily with miralax. Last time she had these symptoms she was pregnant. She has never had these symptoms other than that time. Patient tested this morning and she is not pregnant. Patient's goal is to not get pregnany at this time. RD notified patient of recommendations per jenniffer. Patient to get labs done 7/31. Advised to call back directly if there are further questions, or if these symptoms fail to improve as anticipated or worsen.

## 2023-08-01 ENCOUNTER — HOSPITAL ENCOUNTER (OUTPATIENT)
Age: 33
Setting detail: SPECIMEN
Discharge: HOME | End: 2023-08-01

## 2023-08-01 DIAGNOSIS — D50.9 IRON DEFICIENCY ANEMIA, UNSPECIFIED IRON DEFICIENCY ANEMIA TYPE: ICD-10-CM

## 2023-08-01 DIAGNOSIS — E66.3 OVERWEIGHT (BMI 25.0-29.9): ICD-10-CM

## 2023-08-01 DIAGNOSIS — Z98.84 S/P LAPAROSCOPIC SLEEVE GASTRECTOMY: ICD-10-CM

## 2023-08-01 DIAGNOSIS — K21.9 GASTROESOPHAGEAL REFLUX DISEASE WITHOUT ESOPHAGITIS: ICD-10-CM

## 2023-08-01 LAB
25(OH)D3 SERPL-MCNC: 37 NG/ML
ALBUMIN SERPL-MCNC: 4.4 G/DL (ref 3.5–5.2)
ALBUMIN/GLOB SERPL: 1.6 {RATIO} (ref 1–2.5)
ALP SERPL-CCNC: 74 U/L (ref 35–104)
ALT SERPL-CCNC: 23 U/L (ref 5–33)
ANION GAP SERPL CALCULATED.3IONS-SCNC: 12 MMOL/L (ref 9–17)
AST SERPL-CCNC: 15 U/L
BASOPHILS # BLD: 0.04 K/UL (ref 0–0.2)
BASOPHILS NFR BLD: 1 % (ref 0–2)
BILIRUB SERPL-MCNC: 0.6 MG/DL (ref 0.3–1.2)
BUN SERPL-MCNC: 8 MG/DL (ref 6–20)
CALCIUM SERPL-MCNC: 9.4 MG/DL (ref 8.6–10.4)
CHLORIDE SERPL-SCNC: 106 MMOL/L (ref 98–107)
CHOLEST SERPL-MCNC: 125 MG/DL
CHOLESTEROL/HDL RATIO: 3
CO2 SERPL-SCNC: 23 MMOL/L (ref 20–31)
CREAT SERPL-MCNC: 0.7 MG/DL (ref 0.5–0.9)
EOSINOPHIL # BLD: 0.16 K/UL (ref 0–0.44)
EOSINOPHILS RELATIVE PERCENT: 3 % (ref 1–4)
ERYTHROCYTE [DISTWIDTH] IN BLOOD BY AUTOMATED COUNT: 13.2 % (ref 11.8–14.4)
EST. AVERAGE GLUCOSE BLD GHB EST-MCNC: 100 MG/DL
FERRITIN SERPL-MCNC: 249 NG/ML (ref 13–150)
FOLATE SERPL-MCNC: 10.1 NG/ML
GFR SERPL CREATININE-BSD FRML MDRD: >60 ML/MIN/1.73M2
GLUCOSE SERPL-MCNC: 85 MG/DL (ref 70–99)
HBA1C MFR BLD: 5.1 % (ref 4–6)
HCT VFR BLD AUTO: 41.1 % (ref 36.3–47.1)
HDLC SERPL-MCNC: 42 MG/DL
HGB BLD-MCNC: 13.4 G/DL (ref 11.9–15.1)
IMM GRANULOCYTES # BLD AUTO: <0.03 K/UL (ref 0–0.3)
IMM GRANULOCYTES NFR BLD: 0 %
IRON SATN MFR SERPL: 50 % (ref 20–55)
IRON SERPL-MCNC: 112 UG/DL (ref 37–145)
LDLC SERPL CALC-MCNC: 69 MG/DL (ref 0–130)
LYMPHOCYTES NFR BLD: 1.55 K/UL (ref 1.1–3.7)
LYMPHOCYTES RELATIVE PERCENT: 24 % (ref 24–43)
MAGNESIUM SERPL-MCNC: 2.3 MG/DL (ref 1.6–2.6)
MCH RBC QN AUTO: 28.9 PG (ref 25.2–33.5)
MCHC RBC AUTO-ENTMCNC: 32.6 G/DL (ref 28.4–34.8)
MCV RBC AUTO: 88.8 FL (ref 82.6–102.9)
MONOCYTES NFR BLD: 0.45 K/UL (ref 0.1–1.2)
MONOCYTES NFR BLD: 7 % (ref 3–12)
NEUTROPHILS NFR BLD: 65 % (ref 36–65)
NEUTS SEG NFR BLD: 4.23 K/UL (ref 1.5–8.1)
NRBC BLD-RTO: 0 PER 100 WBC
PLATELET # BLD AUTO: 217 K/UL (ref 138–453)
PMV BLD AUTO: 11 FL (ref 8.1–13.5)
POTASSIUM SERPL-SCNC: 4.1 MMOL/L (ref 3.7–5.3)
PROT SERPL-MCNC: 7.2 G/DL (ref 6.4–8.3)
PTH-INTACT SERPL-MCNC: 22.6 PG/ML (ref 14–72)
RBC # BLD AUTO: 4.63 M/UL (ref 3.95–5.11)
SODIUM SERPL-SCNC: 141 MMOL/L (ref 135–144)
T4 FREE SERPL-MCNC: 1.2 NG/DL (ref 0.9–1.7)
TIBC SERPL-MCNC: 225 UG/DL (ref 250–450)
TRIGL SERPL-MCNC: 70 MG/DL
TSH SERPL DL<=0.05 MIU/L-ACNC: 1.29 UIU/ML (ref 0.3–5)
UNSATURATED IRON BINDING CAPACITY: 113 UG/DL (ref 112–347)
VIT B12 SERPL-MCNC: 602 PG/ML (ref 232–1245)
WBC OTHER # BLD: 6.4 K/UL (ref 3.5–11.3)

## 2023-08-04 LAB
REASON FOR REJECTION: NORMAL
SPECIMEN SOURCE: NORMAL
VIT B1 PYROPHOSHATE BLD-SCNC: 124 NMOL/L (ref 70–180)
ZZ NTE CLEAN UP: ORDERED TEST: NORMAL

## 2023-08-05 LAB
RETINYL PALMITATE: <0.02 MG/L (ref 0–0.1)
VITAMIN A LEVEL: 0.59 MG/L (ref 0.3–1.2)
VITAMIN A, INTERP: NORMAL

## 2023-08-14 ENCOUNTER — OFFICE VISIT (OUTPATIENT)
Dept: BARIATRICS/WEIGHT MGMT | Age: 33
End: 2023-08-14
Payer: OTHER GOVERNMENT

## 2023-08-14 VITALS
HEART RATE: 68 BPM | DIASTOLIC BLOOD PRESSURE: 72 MMHG | BODY MASS INDEX: 26.68 KG/M2 | SYSTOLIC BLOOD PRESSURE: 100 MMHG | RESPIRATION RATE: 16 BRPM | HEIGHT: 66 IN | WEIGHT: 166 LBS

## 2023-08-14 DIAGNOSIS — D50.9 IRON DEFICIENCY ANEMIA, UNSPECIFIED IRON DEFICIENCY ANEMIA TYPE: ICD-10-CM

## 2023-08-14 DIAGNOSIS — E66.3 OVERWEIGHT (BMI 25.0-29.9): ICD-10-CM

## 2023-08-14 DIAGNOSIS — Z86.2 HISTORY OF ANEMIA: ICD-10-CM

## 2023-08-14 DIAGNOSIS — R73.03 PREDIABETES: ICD-10-CM

## 2023-08-14 DIAGNOSIS — K59.09 CHRONIC CONSTIPATION: ICD-10-CM

## 2023-08-14 DIAGNOSIS — K21.9 GASTROESOPHAGEAL REFLUX DISEASE WITHOUT ESOPHAGITIS: Primary | ICD-10-CM

## 2023-08-14 DIAGNOSIS — K58.8 OTHER IRRITABLE BOWEL SYNDROME: ICD-10-CM

## 2023-08-14 DIAGNOSIS — Z98.84 S/P LAPAROSCOPIC SLEEVE GASTRECTOMY: ICD-10-CM

## 2023-08-14 PROCEDURE — 99213 OFFICE O/P EST LOW 20 MIN: CPT | Performed by: NURSE PRACTITIONER

## 2023-08-14 NOTE — PROGRESS NOTES
Post-op Bariatric Surgery Note    Subjective     Patient is 9 months s/p laparoscopic sleeve gastrectomy, down 83 lbs. Overall, doing well. Incisions well healed. Consistent use of MVI and calcium. Tolerating po intake. Getting adequate fluids and protein intake. Bowel function normal.  Physical activity includes walking the dog. Blood sugar dropping in the morning, happens 2-3 days a week. Patient states not pregnant. LMP last week. No pain or other specific problems or concerns. Allergies: Allergies   Allergen Reactions    Percocet [Oxycodone-Acetaminophen] Nausea And Vomiting     Other reaction(s): severe Nausea And Vomiting; syncope    Vancomycin Hives and Itching       Past Medical History:     Past Medical History:   Diagnosis Date    Abnormal Pap smear of cervix     Chronic constipation     COVID-19 09/23/2022    fatigue, body aches x 3 days; cough x 5 days    Heartburn     occasional    History of anal fissures     History of hemorrhoids     HPV in female     HSV infection     IBS (irritable bowel syndrome)     Obesity     Prediabetes     no Rx required, borderline    Wellness examination     [facility]; has appt 10/25/22 for clearance   .     Past Surgical History:  Past Surgical History:   Procedure Laterality Date    COLONOSCOPY  10/2020    COLPOSCOPY      HEMORRHOID SURGERY      banding; no anesthesia used    INTRAUTERINE DEVICE INSERTION  04/2017    Mirena    SLEEVE GASTRECTOMY N/A 11/01/2022    XI ROBOTIC LAPAROSCOPIC GASTRECTOMY SLEEVE, EGD - GI SCHEDULED performed by Tommy Bliss DO at [address] [facility] ENDOSCOPY N/A 08/19/2022    EGD BIOPSY performed by Tommy Bliss DO at [address] EXTRACTION      2010       Family History:  Family History   Problem Relation Age of Onset    Diabetes Mother         borderline     Obesity Mother     Cervical Cancer Mother 37

## 2023-09-05 NOTE — PROGRESS NOTES
GI NEW PATIENT OFFICE VISIT    INTERVAL HISTORY:   Wang Ordonez, DO  [address]  [facility] [address], [facility]    Chief Complaint   Patient presents with    Constipation     NP referral stated chronic constipation her whole life, hx of anal fissure and hemorrhoids. extreme pain and bleeding rectally she says. BRBPR       1. Chronic constipation    2. BRBPR (bright red blood per rectum)    3. History of anal fissures    4. History of hemorrhoids    5. Other irritable bowel syndrome      This patient evaluated my office for the first time  She has history for chronic intermittent constipation  Lately she has been having some significant rectal bleeding  She is passing some clots  Seen blood on the stool as well as in the bowl    She has some pain and discomfort in the rectum during passage of stool as well    Patient has been complaining of some abdominal pains, off and on cramping  Also complains of abdominal bloating and gas  Has off and on nausea without any sig vomiting  Has some alternating constipation and diarrhea  Has no weight loss  Has some anxiety issues    Has mild obesity    Denies any GERD dysphagia nausea vomiting hematemesis  No history for smoking alcohol abuse illicit drug usage    Has family history for multiple colon polyps in the past          HISTORY OF PRESENT ILLNESS: Ginger Jacobo is a 35 y.o. female with a past history remarkable for , referred for evaluation of   Chief Complaint   Patient presents with    Constipation     NP referral stated chronic constipation her whole life, hx of anal fissure and hemorrhoids. extreme pain and bleeding rectally she says. BRBPR   . Past Medical,Family, and Social History reviewed and does contribute to the patient presenting condition.     Patient's PMH/PSH,SH,PSYCH Hx, MEDs, ALLERGIES, and ROS were all reviewed and updated in the Spoke with pharmacy, insurance will cover Truclity or Ozempic in replacement for insulin glargine.  Please advise medication change and dosage.    Current:  Insulin glargine 100u/ml:  Inject 10u into skin qhs   appropriate sections.     PAST MEDICAL HISTORY:  Past Medical History:   Diagnosis Date    Abnormal Pap smear of cervix     BRBPR (bright red blood per rectum)     Chronic constipation     History of anal fissures     History of hemorrhoids     HPV in female     HSV infection        Past Surgical History:   Procedure Laterality Date    COLPOSCOPY      INSERTION OF CONTRACEPTIVE CAPSULE  2011    Nexplanon     INTRAUTERINE DEVICE INSERTION  2017    Mirena     REMOVAL OF CONTRACEPTIVE CAPSULE  2011    TONSILLECTOMY      4334    UMBILICAL HERNIA REPAIR      WISDOM TOOTH EXTRACTION      2010       CURRENT MEDICATIONS:    Current Outpatient Medications:     polyethylene glycol (GLYCOLAX) 17 GM/SCOOP powder, Take 17 g by mouth daily as needed, Disp: , Rfl:     hydrocortisone (ANUSOL-HC) 25 MG suppository, Place 1 suppository rectally every 12 hours, Disp: 30 suppository, Rfl: 2    ALLERGIES:   Allergies   Allergen Reactions    Percocet [Oxycodone-Acetaminophen] Nausea And Vomiting     Other reaction(s): severe Nausea And Vomiting; syncope    Vancomycin Hives       FAMILY HISTORY:       Problem Relation Age of Onset    Obesity Mother     Diabetes Mother         borderline     Cervical Cancer Mother 37    Alcohol Abuse Father     Other Father         myeloproliferative disorder    No Known Problems Maternal Grandmother     Obesity Maternal Grandfather     Kidney Disease Maternal Grandfather     Substance Abuse Sister     Thyroid Disease Brother     Breast Cancer Paternal Grandmother     Other Brother         hearing loss          SOCIAL HISTORY:   Social History     Socioeconomic History    Marital status: Single     Spouse name: Not on file    Number of children: Not on file    Years of education: Not on file    Highest education level: Not on file   Occupational History    Not on file   Social Needs    Financial resource strain: Not on file    Food insecurity     Worry: Not on file Inability: Not on file    Transportation needs     Medical: Not on file     Non-medical: Not on file   Tobacco Use    Smoking status: Never Smoker    Smokeless tobacco: Never Used   Substance and Sexual Activity    Alcohol use: No    Drug use: No    Sexual activity: Not Currently     Partners: Male     Birth control/protection: I.U.D. Lifestyle    Physical activity     Days per week: Not on file     Minutes per session: Not on file    Stress: Not on file   Relationships    Social connections     Talks on phone: Not on file     Gets together: Not on file     Attends Islam service: Not on file     Active member of club or organization: Not on file     Attends meetings of clubs or organizations: Not on file     Relationship status: Not on file    Intimate partner violence     Fear of current or ex partner: Not on file     Emotionally abused: Not on file     Physically abused: Not on file     Forced sexual activity: Not on file   Other Topics Concern    Not on file   Social History Narrative    Not on file         REVIEW OF SYSTEMS:         Review of Systems   Constitutional: Negative for appetite change, fatigue and unexpected weight change. HENT: Negative for dental problem, postnasal drip, sinus pressure, sore throat, trouble swallowing and voice change. Eyes: Negative for visual disturbance. Respiratory: Negative for cough, choking and wheezing. Cardiovascular: Negative for chest pain, palpitations and leg swelling. Gastrointestinal: Positive for blood in stool, constipation (chronic) and rectal pain. Negative for abdominal distention, abdominal pain (cramping), anal bleeding, diarrhea, nausea and vomiting. Genitourinary: Negative for difficulty urinating. Musculoskeletal: Negative for arthralgias, back pain, gait problem and myalgias. Allergic/Immunologic: Negative for environmental allergies and food allergies.    Neurological: Negative for dizziness, weakness, light-headedness, numbness and headaches. Hematological: Does not bruise/bleed easily. Psychiatric/Behavioral: Negative for sleep disturbance. The patient is not nervous/anxious. PHYSICAL EXAMINATION: Vital signs reviewed per the nursing documentation. Wt 237 lb 12.8 oz (107.9 kg)   BMI 37.81 kg/m²   Body mass index is 37.81 kg/m². Physical Exam  Nursing note reviewed. Constitutional:       Appearance: She is well-developed. Comments: Anxious   HENT:      Head: Normocephalic and atraumatic. Eyes:      Conjunctiva/sclera: Conjunctivae normal.      Pupils: Pupils are equal, round, and reactive to light. Neck:      Musculoskeletal: Normal range of motion and neck supple. Cardiovascular:      Heart sounds: Normal heart sounds. Pulmonary:      Effort: Pulmonary effort is normal.      Breath sounds: Normal breath sounds. Abdominal:      General: Bowel sounds are normal.      Palpations: Abdomen is soft. Comments: NON TENDER, NON DISTENTED  LIVER SPLEEN AND HERNIAS ARE NOT  PALPABLE  BOWEL SOUNDS ARE POSITIVE        Musculoskeletal: Normal range of motion. Skin:     General: Skin is warm. Neurological:      Mental Status: She is alert and oriented to person, place, and time.    Psychiatric:         Behavior: Behavior normal.           LABORATORY DATA: Reviewed  Lab Results   Component Value Date    WBC 7.2 05/03/2019    HGB 13.1 05/03/2019    HCT 40.1 05/03/2019    MCV 83.4 05/03/2019     05/03/2019     05/03/2019    K 4.3 05/03/2019     05/03/2019    CO2 25 05/03/2019    BUN 12 05/03/2019    CREATININE 0.80 05/03/2019    LABALBU 4.3 05/03/2019    BILITOT 0.3 05/03/2019    ALKPHOS 72 05/03/2019    AST 11 05/03/2019    ALT 17 05/03/2019         Lab Results   Component Value Date    RBC 4.81 05/03/2019    HGB 13.1 05/03/2019    MCV 83.4 05/03/2019    MCH 27.2 05/03/2019    MCHC 32.7 05/03/2019    RDW 12.6 05/03/2019    MPV 10.8 05/03/2019    BASOPCT 50 05/03/2019    LYMPHSABS 21.9 05/03/2019    MONOSABS 7.4 05/03/2019    NEUTROABS 67.8 05/03/2019    EOSABS 2.2 05/03/2019    BASOSABS 0.7 05/03/2019         DIAGNOSTIC TESTING:     No results found. Assessment  1. Chronic constipation    2. BRBPR (bright red blood per rectum)    3. History of anal fissures    4. History of hemorrhoids    5. Other irritable bowel syndrome        Plan    Plan colonoscopy to evaluate    The Endoscopic procedure was explained to the patient in detail  The prep and NPO were explained  All the Risks, Benefits, and Alternatives were explained  Risk of Bleeding, Perforation and Cardio Respiratory risks were explained  her questions were answered  The procedure has been scheduled with the  in the office  Patient was asked to give us a call for any questions  The patient has verbalized understanding and agreement to this plan. Anusol suppositories    Patient was explained about the local care for his hemorrhoids and rectal irritation  Patient was asked to do some sitz baths with aspen salt. Apply ice or cold compresses on the anal area for any relieve of swelling   Can take pain killers Tylenol for temporarily relief of the pain in that area   Use baby / adult wipes or wash after having bowel movements  Avoid straining during defecation   Use ample fiber in the diet  Drink  ample water  Use supplemental fiber like metamucil with ample water  Call for any issues  The patient has verbalized understanding and agreement to this plan      Pt was advised in detail about some life style and dietary modifications. She was advised about avoidance of caffeine, nicotine and chocolate. Pt was also told to stay away from any kind of fast foods, soda pops. She was also advised to avoid lots of spices, grease and fried food etc.     Instructions were also given about trying to arrange the timing, quality and quantity of food.     Instructions were given about using ample amount of fiber including dietary and supplemental fiber either metamucil, bennafiber or citrucell etc.  Pt was advised about drinking ample amount of water without any colors or chemicals. Stress was given about regular exercise. Pt has verbalized understanding and agreement to these modifications. Pt was given advices and instructions about weight loss. She was advised about the significance of exercise at least three times a week . Dietary advices were also given about the avoidance of fast food, fried food and lots of spices and grease. nstructions were given about using ample amount of fiber including dietary and supplemental fiber either metamucil, bennafiber or citrucell etc.  Pt was advised about drinking ample amount of water without any colors or chemicals. Stress was given about regular exercise. Pt was told to stay way from soda pops. Pt has verbalized understanding and agrrement    More than half of patient's clinic visit time was spent in counseling about lifestyle and dietary modifications  Patient's  questions were answered in this regard as well  The patient has verbalized understanding and agreement     Thank you for allowing me to participate in the care of Ms. Shahbaz Whyte. For any further questions please do not hesitate to contact me. I have reviewed and agree with the ROS entered by the MA/Nurse.          Shailesh Steinberg MD, [facility]  Board Certified in Gastroenterology and [address] [place] [address] Gastroenterology  Office #: [phone]

## (undated) DEVICE — INSUFFLATION TUBING SET WITH FILTER, FUNNEL CONNECTOR AND LUER LOCK: Brand: JOSNOE MEDICAL INC

## (undated) DEVICE — SIMPLICITY FLUFF UNDERPAD 23X36, MODERATE: Brand: SIMPLICITY

## (undated) DEVICE — SOLUTION ANTIFOG VIS SYS CLEARIFY LAPSCP

## (undated) DEVICE — Device

## (undated) DEVICE — 4-PORT MANIFOLD: Brand: NEPTUNE 2

## (undated) DEVICE — TROCAR: Brand: KII FIOS FIRST ENTRY

## (undated) DEVICE — BLADELESS OBTURATOR: Brand: WECK VISTA

## (undated) DEVICE — ADHESIVE SKIN CLOSURE TOP 36 CC HI VISC DERMBND MINI

## (undated) DEVICE — STAPLER 60: Brand: SUREFORM

## (undated) DEVICE — CONNECTOR TBNG AUX H2O JET DISP FOR OLY 160/180 SER

## (undated) DEVICE — Device: Brand: DEFENDO VALVE AND CONNECTOR KIT

## (undated) DEVICE — TUBE GASTROSTMY 22FR MED GRD SIL FEED GAM RECESS DST TIP

## (undated) DEVICE — GLOVE SURG SZ 65 THK91MIL LTX FREE SYN POLYISOPRENE

## (undated) DEVICE — TUBING, SUCTION, 3/16" X 10', STRAIGHT: Brand: MEDLINE

## (undated) DEVICE — STAPLER 60 RELOAD BLUE: Brand: SUREFORM

## (undated) DEVICE — ARM DRAPE

## (undated) DEVICE — STAPLER 60 RELOAD GREEN: Brand: SUREFORM

## (undated) DEVICE — GLOVE SURG SZ 6 THK91MIL LTX FREE SYN POLYISOPRENE ANTI

## (undated) DEVICE — SOLUTION IV IRRIG WATER 1000ML POUR BRL 2F7114

## (undated) DEVICE — BITEBLOCK 54FR W/ DENT RIM BLOX

## (undated) DEVICE — BINDER ABD 4 PANEL 82-100 IN 3XL 12 IN COTTON PROCARE

## (undated) DEVICE — TUBING IRRIG L10IN DISP PMP ENDOGATOR E

## (undated) DEVICE — YANKAUER,FLEXIBLE HANDLE,REGLR CAPACITY: Brand: MEDLINE INDUSTRIES, INC.

## (undated) DEVICE — VESSEL SEALER EXTEND: Brand: ENDOWRIST

## (undated) DEVICE — CANNULA IV 18GA L15IN BLNT FILL LUERLOCK HUB MJCT

## (undated) DEVICE — FORCEPS BX L240CM JAW DIA2.8MM L CAP W/ NDL MIC MESH TOOTH

## (undated) DEVICE — SUTURE SZ 0 27IN 5/8 CIR UR-6  TAPER PT VIOLET ABSRB VICRYL J603H

## (undated) DEVICE — REDUCER: Brand: ENDOWRIST

## (undated) DEVICE — POLYP TRAP: Brand: TRAPEASE®

## (undated) DEVICE — TOWEL,OR,DSP,ST,NATURAL,DLX,4/PK,20PK/CS: Brand: MEDLINE

## (undated) DEVICE — SUTURE NONABSORBABLE MONOFILAMENT 3-0 PS-1 18 IN BLK ETHILON 1663H

## (undated) DEVICE — BASIN EMSIS 700ML GRAPHITE PLAS KID SHP GRAD

## (undated) DEVICE — SEAL

## (undated) DEVICE — GLOVE ORANGE PI 7 1/2   MSG9075

## (undated) DEVICE — VISIGI 3D®  CALIBRATION SYSTEM  SIZE 36FR STD W/ BULB: Brand: BOEHRINGER® VISIGI 3D™ SLEEVE GASTRECTOMY CALIBRATION SYSTEM, SIZE 36FR W/BULB

## (undated) DEVICE — APPLICATOR MEDICATED 26 CC SOLUTION HI LT ORNG CHLORAPREP

## (undated) DEVICE — SUTURE VIC + SH-13-0 27IN  VCP311H

## (undated) DEVICE — CANNULA SEAL

## (undated) DEVICE — PERRYSBURG ENDO PACK: Brand: MEDLINE INDUSTRIES, INC.